# Patient Record
Sex: MALE | Race: WHITE | Employment: UNEMPLOYED | URBAN - METROPOLITAN AREA
[De-identification: names, ages, dates, MRNs, and addresses within clinical notes are randomized per-mention and may not be internally consistent; named-entity substitution may affect disease eponyms.]

---

## 2018-01-05 ENCOUNTER — APPOINTMENT (OUTPATIENT)
Dept: GENERAL RADIOLOGY | Facility: CLINIC | Age: 48
End: 2018-01-05
Attending: EMERGENCY MEDICINE
Payer: MEDICAID

## 2018-01-05 ENCOUNTER — HOSPITAL ENCOUNTER (OUTPATIENT)
Facility: CLINIC | Age: 48
Setting detail: OBSERVATION
Discharge: PSYCHIATRIC OR SUBSTANCE USE FACILITY | End: 2018-01-12
Attending: EMERGENCY MEDICINE | Admitting: INTERNAL MEDICINE
Payer: MEDICAID

## 2018-01-05 DIAGNOSIS — M62.81 GENERALIZED MUSCLE WEAKNESS: ICD-10-CM

## 2018-01-05 DIAGNOSIS — F11.288 OPIOID DEPENDENCE WITH OTHER OPIOID-INDUCED DISORDER (H): ICD-10-CM

## 2018-01-05 DIAGNOSIS — G25.81 RESTLESS LEG SYNDROME: ICD-10-CM

## 2018-01-05 DIAGNOSIS — F41.8 DEPRESSION WITH ANXIETY: Primary | ICD-10-CM

## 2018-01-05 DIAGNOSIS — E11.9 TYPE 2 DIABETES MELLITUS WITHOUT COMPLICATION, WITHOUT LONG-TERM CURRENT USE OF INSULIN (H): ICD-10-CM

## 2018-01-05 DIAGNOSIS — Z79.891 CHRONIC USE OF OPIATE DRUGS THERAPEUTIC PURPOSES: ICD-10-CM

## 2018-01-05 DIAGNOSIS — F32.A DEPRESSION, UNSPECIFIED DEPRESSION TYPE: ICD-10-CM

## 2018-01-05 DIAGNOSIS — Z78.9 TAKES DIETARY SUPPLEMENTS: ICD-10-CM

## 2018-01-05 PROBLEM — R53.1 WEAKNESS: Status: ACTIVE | Noted: 2018-01-05

## 2018-01-05 LAB
ALBUMIN SERPL-MCNC: 4.2 G/DL (ref 3.4–5)
ALBUMIN UR-MCNC: NEGATIVE MG/DL
ALP SERPL-CCNC: 140 U/L (ref 40–150)
ALT SERPL W P-5'-P-CCNC: 39 U/L (ref 0–70)
AMPHETAMINES UR QL SCN: NEGATIVE
ANION GAP SERPL CALCULATED.3IONS-SCNC: 9 MMOL/L (ref 3–14)
APAP SERPL-MCNC: <2 MG/L (ref 10–20)
APPEARANCE UR: CLEAR
AST SERPL W P-5'-P-CCNC: 22 U/L (ref 0–45)
BARBITURATES UR QL: NEGATIVE
BASOPHILS # BLD AUTO: 0 10E9/L (ref 0–0.2)
BASOPHILS NFR BLD AUTO: 0.4 %
BENZODIAZ UR QL: POSITIVE
BILIRUB SERPL-MCNC: 0.8 MG/DL (ref 0.2–1.3)
BILIRUB UR QL STRIP: NEGATIVE
BUN SERPL-MCNC: 12 MG/DL (ref 7–30)
CALCIUM SERPL-MCNC: 9.3 MG/DL (ref 8.5–10.1)
CANNABINOIDS UR QL SCN: NEGATIVE
CHLORIDE SERPL-SCNC: 99 MMOL/L (ref 94–109)
CO2 SERPL-SCNC: 27 MMOL/L (ref 20–32)
COCAINE UR QL: NEGATIVE
COLOR UR AUTO: YELLOW
CREAT SERPL-MCNC: 0.88 MG/DL (ref 0.66–1.25)
DIFFERENTIAL METHOD BLD: NORMAL
EOSINOPHIL # BLD AUTO: 0.1 10E9/L (ref 0–0.7)
EOSINOPHIL NFR BLD AUTO: 0.5 %
ERYTHROCYTE [DISTWIDTH] IN BLOOD BY AUTOMATED COUNT: 13 % (ref 10–15)
FLUAV+FLUBV AG SPEC QL: NEGATIVE
FLUAV+FLUBV AG SPEC QL: NEGATIVE
GFR SERPL CREATININE-BSD FRML MDRD: >90 ML/MIN/1.7M2
GLUCOSE SERPL-MCNC: 224 MG/DL (ref 70–99)
GLUCOSE UR STRIP-MCNC: 500 MG/DL
HCT VFR BLD AUTO: 43 % (ref 40–53)
HGB BLD-MCNC: 15.6 G/DL (ref 13.3–17.7)
HGB UR QL STRIP: NEGATIVE
IMM GRANULOCYTES # BLD: 0 10E9/L (ref 0–0.4)
IMM GRANULOCYTES NFR BLD: 0.3 %
INTERPRETATION ECG - MUSE: NORMAL
KETONES UR STRIP-MCNC: ABNORMAL MG/DL
LEUKOCYTE ESTERASE UR QL STRIP: NEGATIVE
LYMPHOCYTES # BLD AUTO: 1.4 10E9/L (ref 0.8–5.3)
LYMPHOCYTES NFR BLD AUTO: 13.9 %
MCH RBC QN AUTO: 32.6 PG (ref 26.5–33)
MCHC RBC AUTO-ENTMCNC: 36.3 G/DL (ref 31.5–36.5)
MCV RBC AUTO: 90 FL (ref 78–100)
MONOCYTES # BLD AUTO: 0.8 10E9/L (ref 0–1.3)
MONOCYTES NFR BLD AUTO: 7.5 %
NEUTROPHILS # BLD AUTO: 7.9 10E9/L (ref 1.6–8.3)
NEUTROPHILS NFR BLD AUTO: 77.4 %
NITRATE UR QL: NEGATIVE
NRBC # BLD AUTO: 0 10*3/UL
NRBC BLD AUTO-RTO: 0 /100
OPIATES UR QL SCN: POSITIVE
PCP UR QL SCN: NEGATIVE
PH UR STRIP: 6 PH (ref 5–7)
PLATELET # BLD AUTO: 287 10E9/L (ref 150–450)
POTASSIUM SERPL-SCNC: 3.4 MMOL/L (ref 3.4–5.3)
PROT SERPL-MCNC: 7.6 G/DL (ref 6.8–8.8)
RBC # BLD AUTO: 4.79 10E12/L (ref 4.4–5.9)
SALICYLATES SERPL-MCNC: <2 MG/DL
SODIUM SERPL-SCNC: 135 MMOL/L (ref 133–144)
SOURCE: ABNORMAL
SP GR UR STRIP: 1.01 (ref 1–1.03)
SPECIMEN SOURCE: NORMAL
TROPONIN I SERPL-MCNC: <0.015 UG/L (ref 0–0.04)
UROBILINOGEN UR STRIP-ACNC: 1 EU/DL (ref 0.2–1)
WBC # BLD AUTO: 10.3 10E9/L (ref 4–11)

## 2018-01-05 PROCEDURE — 25000128 H RX IP 250 OP 636: Performed by: INTERNAL MEDICINE

## 2018-01-05 PROCEDURE — G0378 HOSPITAL OBSERVATION PER HR: HCPCS

## 2018-01-05 PROCEDURE — 81003 URINALYSIS AUTO W/O SCOPE: CPT | Performed by: EMERGENCY MEDICINE

## 2018-01-05 PROCEDURE — 25000132 ZZH RX MED GY IP 250 OP 250 PS 637: Performed by: INTERNAL MEDICINE

## 2018-01-05 PROCEDURE — 84484 ASSAY OF TROPONIN QUANT: CPT | Performed by: EMERGENCY MEDICINE

## 2018-01-05 PROCEDURE — 96360 HYDRATION IV INFUSION INIT: CPT

## 2018-01-05 PROCEDURE — 25000128 H RX IP 250 OP 636: Performed by: EMERGENCY MEDICINE

## 2018-01-05 PROCEDURE — 80053 COMPREHEN METABOLIC PANEL: CPT | Performed by: EMERGENCY MEDICINE

## 2018-01-05 PROCEDURE — 85025 COMPLETE CBC W/AUTO DIFF WBC: CPT | Performed by: EMERGENCY MEDICINE

## 2018-01-05 PROCEDURE — 99220 ZZC INITIAL OBSERVATION CARE,LEVL III: CPT | Performed by: INTERNAL MEDICINE

## 2018-01-05 PROCEDURE — 99285 EMERGENCY DEPT VISIT HI MDM: CPT | Mod: 25

## 2018-01-05 PROCEDURE — 80329 ANALGESICS NON-OPIOID 1 OR 2: CPT | Performed by: EMERGENCY MEDICINE

## 2018-01-05 PROCEDURE — 87804 INFLUENZA ASSAY W/OPTIC: CPT | Performed by: EMERGENCY MEDICINE

## 2018-01-05 PROCEDURE — 96361 HYDRATE IV INFUSION ADD-ON: CPT

## 2018-01-05 PROCEDURE — 71046 X-RAY EXAM CHEST 2 VIEWS: CPT

## 2018-01-05 PROCEDURE — 90791 PSYCH DIAGNOSTIC EVALUATION: CPT

## 2018-01-05 PROCEDURE — 93005 ELECTROCARDIOGRAM TRACING: CPT

## 2018-01-05 RX ORDER — FENOFIBRATE 54 MG/1
54 TABLET ORAL DAILY
Status: DISCONTINUED | OUTPATIENT
Start: 2018-01-05 | End: 2018-01-12 | Stop reason: HOSPADM

## 2018-01-05 RX ORDER — CLOMIPRAMINE HYDROCHLORIDE 25 MG/1
25 CAPSULE ORAL DAILY
Status: DISCONTINUED | OUTPATIENT
Start: 2018-01-06 | End: 2018-01-06

## 2018-01-05 RX ORDER — ONDANSETRON 2 MG/ML
4 INJECTION INTRAMUSCULAR; INTRAVENOUS EVERY 6 HOURS PRN
Status: DISCONTINUED | OUTPATIENT
Start: 2018-01-05 | End: 2018-01-12 | Stop reason: HOSPADM

## 2018-01-05 RX ORDER — CLONIDINE HYDROCHLORIDE 0.1 MG/1
0.1 TABLET ORAL 2 TIMES DAILY
Status: DISCONTINUED | OUTPATIENT
Start: 2018-01-05 | End: 2018-01-12 | Stop reason: HOSPADM

## 2018-01-05 RX ORDER — TAMSULOSIN HYDROCHLORIDE 0.4 MG/1
0.4 CAPSULE ORAL DAILY
COMMUNITY

## 2018-01-05 RX ORDER — GABAPENTIN 600 MG/1
600 TABLET ORAL 3 TIMES DAILY
Status: DISCONTINUED | OUTPATIENT
Start: 2018-01-05 | End: 2018-01-12 | Stop reason: HOSPADM

## 2018-01-05 RX ORDER — HYDROCODONE BITARTRATE AND ACETAMINOPHEN 5; 325 MG/1; MG/1
1 TABLET ORAL EVERY 6 HOURS PRN
Status: DISCONTINUED | OUTPATIENT
Start: 2018-01-05 | End: 2018-01-06

## 2018-01-05 RX ORDER — ONDANSETRON 4 MG/1
4 TABLET, ORALLY DISINTEGRATING ORAL EVERY 6 HOURS PRN
Status: DISCONTINUED | OUTPATIENT
Start: 2018-01-05 | End: 2018-01-12 | Stop reason: HOSPADM

## 2018-01-05 RX ORDER — POLYETHYLENE GLYCOL 3350 17 G/17G
17 POWDER, FOR SOLUTION ORAL DAILY
Status: DISCONTINUED | OUTPATIENT
Start: 2018-01-06 | End: 2018-01-12 | Stop reason: HOSPADM

## 2018-01-05 RX ORDER — ACETAMINOPHEN 325 MG/1
650 TABLET ORAL EVERY 4 HOURS PRN
Status: DISCONTINUED | OUTPATIENT
Start: 2018-01-05 | End: 2018-01-12 | Stop reason: HOSPADM

## 2018-01-05 RX ORDER — LOSARTAN POTASSIUM 50 MG/1
100 TABLET ORAL DAILY
Status: DISCONTINUED | OUTPATIENT
Start: 2018-01-06 | End: 2018-01-06

## 2018-01-05 RX ORDER — CLONAZEPAM 0.5 MG/1
1 TABLET ORAL 3 TIMES DAILY
Status: DISCONTINUED | OUTPATIENT
Start: 2018-01-05 | End: 2018-01-05

## 2018-01-05 RX ORDER — ALLOPURINOL 300 MG/1
300 TABLET ORAL DAILY
Status: DISCONTINUED | OUTPATIENT
Start: 2018-01-06 | End: 2018-01-12 | Stop reason: HOSPADM

## 2018-01-05 RX ORDER — ATORVASTATIN CALCIUM 20 MG/1
20 TABLET, FILM COATED ORAL DAILY
Status: DISCONTINUED | OUTPATIENT
Start: 2018-01-06 | End: 2018-01-12 | Stop reason: HOSPADM

## 2018-01-05 RX ORDER — POLYETHYLENE GLYCOL 3350 17 G/17G
1 POWDER, FOR SOLUTION ORAL DAILY
COMMUNITY

## 2018-01-05 RX ORDER — SODIUM CHLORIDE 9 MG/ML
1000 INJECTION, SOLUTION INTRAVENOUS CONTINUOUS
Status: DISCONTINUED | OUTPATIENT
Start: 2018-01-05 | End: 2018-01-07

## 2018-01-05 RX ORDER — NALOXONE HYDROCHLORIDE 0.4 MG/ML
.1-.4 INJECTION, SOLUTION INTRAMUSCULAR; INTRAVENOUS; SUBCUTANEOUS
Status: DISCONTINUED | OUTPATIENT
Start: 2018-01-05 | End: 2018-01-12 | Stop reason: HOSPADM

## 2018-01-05 RX ORDER — METFORMIN HCL 500 MG
1000 TABLET, EXTENDED RELEASE 24 HR ORAL DAILY
Status: ON HOLD | COMMUNITY
End: 2018-01-12

## 2018-01-05 RX ORDER — CLONAZEPAM 0.5 MG/1
1 TABLET ORAL 3 TIMES DAILY
Status: DISCONTINUED | OUTPATIENT
Start: 2018-01-05 | End: 2018-01-06

## 2018-01-05 RX ORDER — ZOLPIDEM TARTRATE 5 MG/1
10 TABLET ORAL
Status: DISCONTINUED | OUTPATIENT
Start: 2018-01-05 | End: 2018-01-06

## 2018-01-05 RX ORDER — FEXOFENADINE HCL 60 MG/1
180 TABLET, FILM COATED ORAL DAILY PRN
Status: DISCONTINUED | OUTPATIENT
Start: 2018-01-05 | End: 2018-01-12 | Stop reason: HOSPADM

## 2018-01-05 RX ORDER — SODIUM CHLORIDE 9 MG/ML
INJECTION, SOLUTION INTRAVENOUS CONTINUOUS
Status: DISCONTINUED | OUTPATIENT
Start: 2018-01-05 | End: 2018-01-07

## 2018-01-05 RX ORDER — ACETAMINOPHEN 650 MG/1
650 SUPPOSITORY RECTAL EVERY 4 HOURS PRN
Status: DISCONTINUED | OUTPATIENT
Start: 2018-01-05 | End: 2018-01-12 | Stop reason: HOSPADM

## 2018-01-05 RX ORDER — TAMSULOSIN HYDROCHLORIDE 0.4 MG/1
0.4 CAPSULE ORAL DAILY
Status: DISCONTINUED | OUTPATIENT
Start: 2018-01-06 | End: 2018-01-12 | Stop reason: HOSPADM

## 2018-01-05 RX ORDER — PROPRANOLOL HYDROCHLORIDE 20 MG/1
20 TABLET ORAL 3 TIMES DAILY
Status: DISCONTINUED | OUTPATIENT
Start: 2018-01-05 | End: 2018-01-12 | Stop reason: HOSPADM

## 2018-01-05 RX ORDER — ALBUTEROL SULFATE 90 UG/1
1-2 AEROSOL, METERED RESPIRATORY (INHALATION) EVERY 4 HOURS PRN
COMMUNITY

## 2018-01-05 RX ORDER — ASPIRIN 81 MG/1
81 TABLET ORAL DAILY
Status: DISCONTINUED | OUTPATIENT
Start: 2018-01-06 | End: 2018-01-12 | Stop reason: HOSPADM

## 2018-01-05 RX ORDER — ALBUTEROL SULFATE 90 UG/1
1-2 AEROSOL, METERED RESPIRATORY (INHALATION) EVERY 4 HOURS PRN
Status: DISCONTINUED | OUTPATIENT
Start: 2018-01-05 | End: 2018-01-12 | Stop reason: HOSPADM

## 2018-01-05 RX ADMIN — CLONAZEPAM 1 MG: 0.5 TABLET ORAL at 20:04

## 2018-01-05 RX ADMIN — SODIUM CHLORIDE: 9 INJECTION, SOLUTION INTRAVENOUS at 21:42

## 2018-01-05 RX ADMIN — SODIUM CHLORIDE 1000 ML: 9 INJECTION, SOLUTION INTRAVENOUS at 14:11

## 2018-01-05 RX ADMIN — FENOFIBRATE 54 MG: 54 TABLET ORAL at 20:04

## 2018-01-05 RX ADMIN — PROPRANOLOL HYDROCHLORIDE 20 MG: 20 TABLET ORAL at 21:42

## 2018-01-05 RX ADMIN — ZOLPIDEM TARTRATE 10 MG: 5 TABLET, FILM COATED ORAL at 21:42

## 2018-01-05 RX ADMIN — GABAPENTIN 600 MG: 600 TABLET, FILM COATED ORAL at 21:42

## 2018-01-05 RX ADMIN — CLONIDINE HYDROCHLORIDE 0.1 MG: 0.1 TABLET ORAL at 20:04

## 2018-01-05 RX ADMIN — PROPRANOLOL HYDROCHLORIDE 20 MG: 20 TABLET ORAL at 18:45

## 2018-01-05 ASSESSMENT — ENCOUNTER SYMPTOMS
ABDOMINAL PAIN: 1
VOMITING: 0
NAUSEA: 1
DYSURIA: 1
HALLUCINATIONS: 0
APPETITE CHANGE: 1
CONSTIPATION: 1

## 2018-01-05 NOTE — IP AVS SNAPSHOT
` `     [place] [address] [facility]: [phone]            Medication Administration Report for Dhruv Shaikh as of 01/09/18 0927   Legend:    Given Hold Not Given Due Canceled Entry Other Actions    Time Time (Time) Time  Time-Action       Inactive    Active    Linked        Medications 01/03/18 01/04/18 01/05/18 01/06/18 01/07/18 01/08/18 01/09/18    acetaminophen (TYLENOL) Suppository 650 mg  Dose: 650 mg Freq: EVERY 4 HOURS PRN Route: RE  PRN Reason: mild pain  Start: 01/05/18 1750   Admin Instructions: Alternate ibuprofen (if ordered) with acetaminophen.  Maximum acetaminophen dose from all sources = 75 mg/kg/day not to exceed 4 grams/day.               acetaminophen (TYLENOL) tablet 650 mg  Dose: 650 mg Freq: EVERY 4 HOURS PRN Route: PO  PRN Reason: mild pain  Start: 01/05/18 1750   Admin Instructions: Alternate ibuprofen (if ordered) with acetaminophen.  Maximum acetaminophen dose from all sources = 75 mg/kg/day not to exceed 4 grams/day.          0115 (650 mg)-Given        0151 (650 mg)-Given           albuterol (PROAIR HFA/PROVENTIL HFA/VENTOLIN HFA) Inhaler 1-2 puff  Dose: 1-2 puff Freq: EVERY 4 HOURS PRN Route: IN  PRN Reasons: shortness of breath / dyspnea,wheezing  Start: 01/05/18 1749              allopurinol (ZYLOPRIM) tablet 300 mg  Dose: 300 mg Freq: DAILY Route: PO  Start: 01/06/18 0900       1012 (300 mg)-Given        1001 (300 mg)-Given        0849 (300 mg)-Given        0906 (300 mg)-Given           aspirin EC EC tablet 81 mg  Dose: 81 mg Freq: DAILY Route: PO  Start: 01/06/18 0900       1012 (81 mg)-Given        1001 (81 mg)-Given        0849 (81 mg)-Given        0907 (81 mg)-Given           atorvastatin (LIPITOR) tablet 20 mg  Dose: 20 mg Freq: DAILY Route: PO  Start: 01/06/18 0900       1012 (20 mg)-Given        1001 (20 mg)-Given        0848 (20 mg)-Given        0907 (20 mg)-Given           cloNIDine (CATAPRES) tablet 0.1 mg  Dose: 0.1 mg Freq: 2 TIMES DAILY Route:  PO  Start: 01/05/18 2100   Admin Instructions: Hold for SBP less than 100 or HR < 60       2004 (0.1 mg)-Given        1013 (0.1 mg)-Given       2131 (0.1 mg)-Given        1001 (0.1 mg)-Given       2142 (0.1 mg)-Given        0848 (0.1 mg)-Given       2104 (0.1 mg)-Given        0907 (0.1 mg)-Given       [ ] 2100           fenofibrate tablet 54 mg  Dose: 54 mg Freq: DAILY Route: PO  Start: 01/05/18 2000   Admin Instructions: Therapeutic interchange for Fenofibrate (Tricor) 48 mg daily.<br>       2004 (54 mg)-Given        1012 (54 mg)-Given        1001 (54 mg)-Given        0849 (54 mg)-Given        0907 (54 mg)-Given           fexofenadine (ALLEGRA) tablet 180 mg  Dose: 180 mg Freq: DAILY PRN Route: PO  PRN Reason: allergies  Start: 01/05/18 1750              gabapentin (NEURONTIN) tablet 600 mg  Dose: 600 mg Freq: 3 TIMES DAILY Route: PO  Start: 01/05/18 2200      2142 (600 mg)-Given        1012 (600 mg)-Given       1707 (600 mg)-Given       2131 (600 mg)-Given        1001 (600 mg)-Given       1628 (600 mg)-Given       2141 (600 mg)-Given        0849 (600 mg)-Given       1619 (600 mg)-Given       2104 (600 mg)-Given        0906 (600 mg)-Given       [ ] 1600       [ ] 2200           glucose 40 % gel 15-30 g  Dose: 15-30 g Freq: EVERY 15 MIN PRN Route: PO  PRN Reason: low blood sugar  Start: 01/06/18 1252   Admin Instructions: Give 15 g for BG 51 to 69 mg/dL IF patient is conscious and able to swallow. Give 30 g for BG less than or equal to 50 mg/dL IF patient is conscious and able to swallow. Do NOT give glucose gel via enteral tube.  IF patient has enteral tube: give apple juice 120 mL (4 oz or 15 g of CHO) via enteral tube for BG 51 to 69 mg/dL.  Give apple juice 240 mL (8 oz or 30 g of CHO) via enteral tube for BG less than or equal to 50 mg/dL.    ~Oral gel is preferable for conscious and able to swallow patient.   ~IF gel unavailable or patient refuses may provide apple juice 120 mL (4 oz or 15 g of CHO). Document  juice on I and O flowsheet.              Or  dextrose 50 % injection 25-50 mL  Dose: 25-50 mL Freq: EVERY 15 MIN PRN Route: IV  PRN Reason: low blood sugar  Start: 01/06/18 1252   Admin Instructions: Use if have IV access, BG less than 70 mg/dL and meet dose criteria below:  Dose if conscious and alert (or disorientated) and NPO = 25 mL  Dose if unconscious / not alert = 50 mL  Vesicant. For ordered doses up to 25 mg, give IV Push undiluted. Give each 5g over 1 minute.              Or  glucagon injection 1 mg  Dose: 1 mg Freq: EVERY 15 MIN PRN Route: SC  PRN Reason: low blood sugar  PRN Comment: May repeat x 1 only  Start: 01/06/18 1252   Admin Instructions: May give SQ or IM. ONLY use glucagon IF patient has NO IV access AND is UNABLE to swallow AND blood glucose is LESS than or EQUAL to 50 mg/dL.  Give IV Push over 1 minute. Reconstitute with 1mL sterile water.               metFORMIN (GLUCOPHAGE-XR) 24 hr tablet 1,000 mg  Dose: 1,000 mg Freq: DAILY Route: PO  Start: 01/06/18 1300   Admin Instructions: Do not crush.  If the patient receives intravenous, iodinated contrast and patient GFR is greater than 60 ml/min, continue metformin.  Contact provider for 'hold' or 'no hold' instructions if no GFR or if GFR is less than 60 ml/min.        1404 (1,000 mg)-Given        1001 (1,000 mg)-Given        0848 (1,000 mg)-Given        0907 (1,000 mg)-Given           naloxone (NARCAN) injection 0.1-0.4 mg  Dose: 0.1-0.4 mg Freq: EVERY 2 MIN PRN Route: IV  PRN Reason: opioid reversal  Start: 01/05/18 1750   Admin Instructions: For respiratory rate LESS than or EQUAL to 8.  Partial reversal dose:  0.1 mg titrated q 2 minutes for Analgesia Side Effects Monitoring Sedation Level of 3 (frequently drowsy, arousable, drifts to sleep during conversation).Full reversal dose:  0.4 mg bolus for Analgesia Side Effects Monitoring Sedation Level of 4 (somnolent, minimal or no response to stimulation).  For ordered doses up to 2mg give IVP.  Give each 0.4mg over 15 seconds in emergency situations. For non-emergent situations further dilute in 9mL of NS to facilitate titration of response.               ondansetron (ZOFRAN-ODT) ODT tab 4 mg  Dose: 4 mg Freq: EVERY 6 HOURS PRN Route: PO  PRN Reasons: nausea,vomiting  Start: 01/05/18 1750   Admin Instructions: This is Step 1 of nausea and vomiting management.  If nausea not resolved in 15 minutes, go to Step 2 prochlorperazine (COMPAZINE). Do not push through foil backing. Peel back foil and gently remove. Place on tongue immediately. Administration with liquid unnecessary              Or  ondansetron (ZOFRAN) injection 4 mg  Dose: 4 mg Freq: EVERY 6 HOURS PRN Route: IV  PRN Reasons: nausea,vomiting  Start: 01/05/18 1750   Admin Instructions: This is Step 1 of nausea and vomiting management.  If nausea not resolved in 15 minutes, go to Step 2 prochlorperazine (COMPAZINE).  Irritant. For ordered doses up to 4 mg, give IV Push undiluted over 2-5 minutes.               PHENobarbital (LUMINAL) tablet 64.8 mg  Dose: 64.8 mg Freq: 2 TIMES DAILY Route: PO  Start: 01/09/18 0900   End: 01/10/18 0859          0906 (64.8 mg)-Given       [ ] 2100           polyethylene glycol (MIRALAX/GLYCOLAX) Packet 17 g  Dose: 17 g Freq: DAILY Route: PO  Start: 01/06/18 0900   Admin Instructions: 1 Packet = 17 grams. Mixed prescribed dose in 8 ounces of water. Follow with 8 oz. of water.        1013 (17 g)-Given        1002 (17 g)-Given        (0853)-Not Given        (0908)-Not Given             Dose: 0.125 mg Freq: 3 TIMES DAILY Route: PO  Start: 01/08/18 0900   End: 01/09/18 0846         (0939)-Not Given [C]       1618 (0.125 mg)-Given       2103 (0.125 mg)-Given        0846-Med Discontinued       pramipexole (MIRAPEX) tablet 1.5 mg  Dose: 1.5 mg Freq: 3 TIMES DAILY Route: PO  Start: 01/09/18 0900          [ ] 0900       [ ] 1600       [ ] 2200           propranolol (INDERAL) tablet 20 mg  Dose: 20 mg Freq: 3 TIMES DAILY Route:  PO  Start: 01/05/18 1800   Admin Instructions: Hold for SBP less than 100 or HR < 60       1845 (20 mg)-Given       2142 (20 mg)-Given        1012 (20 mg)-Given       (1655)-Not Given [C]       2130 (20 mg)-Given        1001 (20 mg)-Given       1628 (20 mg)-Given       2141 (20 mg)-Given        0848 (20 mg)-Given       1619 (20 mg)-Given       2105 (20 mg)-Given        0906 (20 mg)-Given       [ ] 1600       [ ] 2200             Dose: 100 mg Freq: AT BEDTIME Route: PO  Start: 01/07/18 2200   End: 01/09/18 0859        2142 (100 mg)-Given        2104 (100 mg)-Given        0859-Med Discontinued       QUEtiapine (SEROquel) tablet 25-50 mg  Dose: 25-50 mg Freq: EVERY 4 HOURS PRN Route: PO  PRN Comment: agitation/anxiety  Start: 01/06/18 1050       1800 (25 mg)-Given       2315 (25 mg)-Given        1505 (25 mg)-Given        0116 (25 mg)-Given       0957 (50 mg)-Given       1428 (25 mg)-Given       1938 (50 mg)-Given [C]        0014 (50 mg)-Given       0906 (25 mg)-Given           tamsulosin (FLOMAX) capsule 0.4 mg  Dose: 0.4 mg Freq: DAILY Route: PO  Start: 01/06/18 0900   Admin Instructions: Administer 30 minutes after the same meal each day.  Capsules should be swallowed whole; do not crush chew or open.        1012 (0.4 mg)-Given        1001 (0.4 mg)-Given        0849 (0.4 mg)-Given        0906 (0.4 mg)-Given           vortioxetine (TRINTELLIX/BRINTELLIX) tablet 5 mg  Dose: 5 mg Freq: DAILY Route: PO  Start: 01/09/18 0900          [ ] 0900          Future Medications  Medications 01/03/18 01/04/18 01/05/18 01/06/18 01/07/18 01/08/18 01/09/18       mirtazapine (REMERON) tablet 30 mg  Dose: 30 mg Freq: AT BEDTIME Route: PO  Start: 01/09/18 2200          [ ] 2200           PHENobarbital (LUMINAL) tablet 32.4 mg  Dose: 32.4 mg Freq: 3 TIMES DAILY Route: PO  Start: 01/10/18 0900              PHENobarbital (LUMINAL) tablet 32.4 mg  Dose: 32.4 mg Freq: DAILY WITH SUPPER Route: PO  Start: 01/09/18 1700   End: 01/10/18 0795           [ ] 1700           QUEtiapine (SEROquel) tablet 150 mg  Dose: 150 mg Freq: AT BEDTIME Route: PO  Start: 01/09/18 2200          [ ] 2200          Completed Medications  Medications 01/03/18 01/04/18 01/05/18 01/06/18 01/07/18 01/08/18 01/09/18         Dose: 32.4 mg Freq: 2 TIMES DAILY Route: PO  Start: 01/06/18 1100   End: 01/06/18 2130       1405 (32.4 mg)-Given       2130 (32.4 mg)-Given                Dose: 64.8 mg Freq: 3 TIMES DAILY Route: PO  Start: 01/08/18 0900   End: 01/08/18 2103         0849 (64.8 mg)-Given       1619 (64.8 mg)-Given       2103 (64.8 mg)-Given           Discontinued Medications  Medications 01/03/18 01/04/18 01/05/18 01/06/18 01/07/18 01/08/18 01/09/18         Rate: 100 mL/hr Freq: CONTINUOUS Route: IV  Start: 01/05/18 1800   End: 01/07/18 1129      2142 ( )-New Bag        0706 ( )-New Bag       1755 ( )-New Bag        0150 ( )-Rate/Dose Verify       0414 ( )-New Bag       1129-Med Discontinued           Rate: 125 mL/hr Freq: CONTINUOUS Route: IV  Start: 01/05/18 1329   End: 01/07/18 1129   Admin Instructions: Administer after the bolus.                1129-Med Discontinued           Dose: 25 mg Freq: DAILY Route: PO  Start: 01/06/18 0900   End: 01/06/18 1055       1012 (25 mg)-Given       1055-Med Discontinued            Dose: 1 mg Freq: 3 TIMES DAILY Route: PO  Start: 01/05/18 2000   End: 01/06/18 1055      2004 (1 mg)-Given        1012 (1 mg)-Given       1055-Med Discontinued            Dose: 5 mg Freq: EVERY MORNING BEFORE BREAKFAST Route: PO  Start: 01/07/18 0730   End: 01/07/18 1457   Admin Instructions: Take before or with meals.         1002 (5 mg)-Given       1457-Med Discontinued           Dose: 1 tablet Freq: EVERY 6 HOURS PRN Route: PO  PRN Reason: moderate to severe pain  Start: 01/05/18 1750   End: 01/06/18 1053   Admin Instructions: Maximum acetaminophen dose from all sources= 75 mg/kg/day not to exceed 4 grams        1053-Med Discontinued            Dose: 1-3 Units  Freq: AT BEDTIME Route: SC  Start: 01/06/18 2200   End: 01/07/18 1458   Admin Instructions: LOW INSULIN RESISTANCE DOSING    Do Not give Bedtime Correction Insulin if BG less than 200.   For  - 299 give 1 unit.   For  - 399 give 2 units   For BG greater than or equal 400 give 3 units.   Notify provider if glucose greater than or equal to 350 mg/dL after administration of correction dose.  If given at mealtime, must be administered 5 min before meal or immediately after.        (2124)-Not Given        1458-Med Discontinued           Dose: 1-3 Units Freq: 3 TIMES DAILY BEFORE MEALS Route: SC  Start: 01/06/18 1300   End: 01/07/18 1458   Admin Instructions: Correction Scale - LOW INSULIN RESISTANCE DOSING     Do Not give Correction Insulin if Pre-Meal BG less than 140.   For Pre-Meal  - 239 give 1 unit.   For Pre-Meal  - 339 give 2 units.   For Pre-Meal BG greater than or equal to 340 give 3 units.   To be given with prandial insulin, and based on pre-meal blood glucose.   Notify provider if glucose greater than or equal to 350 mg/dL after administration of correction dose.  If given at mealtime, must be administered 5 min before meal or immediately after.        1418 (1 Units)-Given       1712 (1 Units)-Given        1002 (1 Units)-Given       (1420)-Not Given       1458-Med Discontinued           Dose: 100 mg Freq: DAILY Route: PO  Start: 01/06/18 0900   End: 01/06/18 1712       1012 (100 mg)-Given       1712-Med Discontinued            Dose: 64.8 mg Freq: 3 TIMES DAILY Route: PO  Start: 01/07/18 0900   End: 01/08/18 0746        1001 (64.8 mg)-Given       1628 (64.8 mg)-Given       2141 (64.8 mg)-Given        0746-Med Discontinued          Dose: 10 mg Freq: AT BEDTIME PRN Route: PO  PRN Reason: sleep  Start: 01/05/18 1750   End: 01/06/18 1023      2142 (10 mg)-Given        1023-Med Discontinued       Medications 01/03/18 01/04/18 01/05/18 01/06/18 01/07/18 01/08/18 01/09/18

## 2018-01-05 NOTE — IP AVS SNAPSHOT
MRN:[id number]                      After Visit Summary   1/5/2018    Dhruv Shaikh    MRN: [id number]           Thank you!     Thank you for choosing [facility] for your care. Our goal is always to provide you with excellent care. Hearing back from our patients is one way we can continue to improve our services. Please take a few minutes to complete the written survey that you may receive in the mail after you visit with us. Thank you!        Patient Information     Date Of Birth          1970        Designated Caregiver       Most Recent Value    Caregiver    Will someone help with your care after discharge? yes    Name of designated caregiver Nydia Shaikh    Phone number of caregiver [phone]    Caregiver address [address] [place], [place]      About your hospital stay     You were admitted on:  January 5, 2018 You last received care in the:  [facility] [address] [facility]    You were discharged on:  January 12, 2018        Reason for your hospital stay       Opioid and benzo dependence.                  Who to Call     For medical emergencies, please call 911.  For non-urgent questions about your medical care, please call your primary care provider or clinic, None          Attending Provider     Provider Specialty    Drew Barnes MD Emergency Medicine    Ralph, Fercho Jones MD Internal Medicine       Primary Care Provider Fax #    Mark Hinkleet [phone]      After Care Instructions     Activity - Up ad radha           Advance Diet as Tolerated       Follow this diet upon discharge: Orders Placed This Encounter      Regular Diet Adult                  Pending Results     No orders found from 1/3/2018 to 1/6/2018.            Statement of Approval     Ordered          01/12/18 1437  I have reviewed and agree with all the recommendations and orders detailed in this document.  EFFECTIVE NOW     Comments:  Transfer to Psychiatry floor.   Approved and  "electronically signed by:  Caryn Alves MD             Admission Information     Date & Time Provider Department Dept. Phone    2018 Fercho Rosas MD [facility] [address] [facility] [phone]      Your Vitals Were     Blood Pressure Pulse Temperature Respirations Height Weight    108/67 (BP Location: Right arm) 86 97.2  F (36.2  C) (Oral) 18 1.727 m (5' 8\") 107 kg (235 lb 14.3 oz)    Pulse Oximetry BMI (Body Mass Index)                96% 35.87 kg/m2          [employer] Information     [employer] lets you send messages to your doctor, view your test results, renew your prescriptions, schedule appointments and more. To sign up, go to [email or web address] . Click on \"Log in\" on the left side of the screen, which will take you to the Welcome page. Then click on \"Sign up Now\" on the right side of the page.     You will be asked to enter the access code listed below, as well as some personal information. Please follow the directions to create your username and password.     Your access code is: [email or web address]  Expires: 2018  9:26 AM     Your access code will  in 90 days. If you need help or a new code, please call your [facility] clinic or [phone].        Care EveryWhere ID     This is your Care EveryWhere ID. This could be used by other organizations to access your [facility] medical records  [id number]        Equal Access to Services     [facility] Hospital[facility] AH: Hadii lisa sepulveda hadasho Soomaali, waaxda luqadaha, qaybta kaalmada mustaphaegyada, ashlie bal . So [facility] [phone].    ATENCIÓN: Si habla español, tiene a bautista disposición servicios gratuitos de asistencia lingüística. Llame al [phone].    We comply with applicable federal civil rights laws and [place] laws. We do not discriminate on the basis of race, color, national origin, age, disability, sex, sexual orientation, or gender identity.               Review of your medicines      START taking        Dose " / Directions    magnesium 250 MG tablet   Used for:  Restless leg syndrome        Dose:  1 tablet   Take 1 tablet by mouth daily   Quantity:  30 tablet   Refills:  0       mirtazapine 45 MG tablet   Commonly known as:  REMERON   Used for:  Depression with anxiety        Dose:  45 mg   Take 1 tablet (45 mg) by mouth At Bedtime   Quantity:  30 tablet   Refills:  0       multivitamin, therapeutic with minerals Tabs tablet   Used for:  Takes dietary supplements        Dose:  1 tablet   Take 1 tablet by mouth daily   Quantity:  100 tablet   Refills:  3       PHENobarbital 32.4 MG Tabs tablet   Commonly known as:  LUMINAL   Used for:  Opioid dependence with other opioid-induced disorder (H)        Dose:  32.4 mg   Take 1 tablet (32.4 mg) by mouth At Bedtime   Quantity:  60 tablet   Refills:  0       pramipexole 1.5 MG tablet   Commonly known as:  MIRAPEX   Used for:  Restless leg syndrome        Dose:  1.5 mg   Take 1 tablet (1.5 mg) by mouth 3 times daily   Refills:  0       * QUEtiapine 300 MG tablet   Commonly known as:  SEROquel   Used for:  Depression with anxiety        Dose:  300 mg   Take 1 tablet (300 mg) by mouth At Bedtime   Quantity:  60 tablet   Refills:  0       * QUEtiapine 25 MG tablet   Commonly known as:  SEROquel   Used for:  Depression with anxiety        Dose:  25-50 mg   Take 1-2 tablets (25-50 mg) by mouth every 4 hours as needed (agitation/anxiety)   Quantity:  60 tablet   Refills:  0       vortioxetine 10 MG tablet   Commonly known as:  TRINTELLIX/BRINTELLIX   Used for:  Depression with anxiety        Dose:  10 mg   Start taking on:  1/13/2018   Take 1 tablet (10 mg) by mouth daily   Refills:  0       * Notice:  This list has 2 medication(s) that are the same as other medications prescribed for you. Read the directions carefully, and ask your doctor or other care provider to review them with you.      CONTINUE these medicines which may have CHANGED, or have new prescriptions. If we are uncertain  of the size of tablets/capsules you have at home, strength may be listed as something that might have changed.        Dose / Directions    * metFORMIN 500 MG 24 hr tablet   Commonly known as:  GLUCOPHAGE-XR   This may have changed:  You were already taking a medication with the same name, and this prescription was added. Make sure you understand how and when to take each.   Used for:  Type 2 diabetes mellitus without complication, without long-term current use of insulin (H)        Dose:  500 mg   Take 1 tablet (500 mg) by mouth daily (with dinner)   Quantity:  30 tablet   Refills:  0       * metFORMIN 500 MG 24 hr tablet   Commonly known as:  GLUCOPHAGE-XR   This may have changed:  when to take this   Used for:  Type 2 diabetes mellitus without complication, without long-term current use of insulin (H)        Dose:  1000 mg   Take 2 tablets (1,000 mg) by mouth daily (with breakfast)   Quantity:  30 tablet   Refills:  0       * Notice:  This list has 2 medication(s) that are the same as other medications prescribed for you. Read the directions carefully, and ask your doctor or other care provider to review them with you.      CONTINUE these medicines which have NOT CHANGED        Dose / Directions    albuterol 108 (90 BASE) MCG/ACT Inhaler   Commonly known as:  PROAIR HFA/PROVENTIL HFA/VENTOLIN HFA        Dose:  1-2 puff   Inhale 1-2 puffs into the lungs every 4 hours as needed for shortness of breath / dyspnea or wheezing   Refills:  0       ALLEGRA PO        Dose:  180 mg   Take 180 mg by mouth daily as needed for allergies   Refills:  0       allopurinol 300 MG tablet   Commonly known as:  ZYLOPRIM        Dose:  300 mg   Take 300 mg by mouth daily.   Refills:  0       ASPIRIN EC PO        Dose:  81 mg   Take 81 mg by mouth daily   Refills:  0       ATORVASTATIN CALCIUM PO        Dose:  20 mg   Take 20 mg by mouth daily   Refills:  0       CLONIDINE HCL PO        Dose:  0.1 mg   Take 0.1 mg by mouth 2 times daily    Refills:  0       FLOMAX 0.4 MG capsule   Generic drug:  tamsulosin        Dose:  0.4 mg   Take 0.4 mg by mouth daily   Refills:  0       GABAPENTIN PO        Dose:  600 mg   Take 600 mg by mouth 3 times daily   Refills:  0       polyethylene glycol Packet   Commonly known as:  MIRALAX/GLYCOLAX        Dose:  1 packet   Take 1 packet by mouth daily   Refills:  0       PROPRANOLOL HCL PO        Dose:  20 mg   Take 20 mg by mouth 3 times daily   Refills:  0       TRICOR PO        Dose:  48 mg   Take 48 mg by mouth daily   Refills:  0         STOP taking     AMBIEN PO           CLOMIPRAMINE HCL PO           CLONAZEPAM PO           CYCLOBENZAPRINE HCL PO           HYDROcodone-acetaminophen  MG per tablet   Commonly known as:  LORTAB           LOSARTAN POTASSIUM PO                Where to get your medicines      Some of these will need a paper prescription and others can be bought over the counter. Ask your nurse if you have questions.     You don't need a prescription for these medications     magnesium 250 MG tablet    metFORMIN 500 MG 24 hr tablet    metFORMIN 500 MG 24 hr tablet    mirtazapine 45 MG tablet    multivitamin, therapeutic with minerals Tabs tablet    pramipexole 1.5 MG tablet    QUEtiapine 25 MG tablet    QUEtiapine 300 MG tablet    vortioxetine 10 MG tablet         Information about where to get these medications is not yet available     ! Ask your nurse or doctor about these medications     PHENobarbital 32.4 MG Tabs tablet                Protect others around you: Learn how to safely use, store and throw away your medicines at www.disposemymeds.org.             Medication List: This is a list of all your medications and when to take them. Check marks below indicate your daily home schedule. Keep this list as a reference.      Medications           Morning Afternoon Evening Bedtime As Needed    albuterol 108 (90 BASE) MCG/ACT Inhaler   Commonly known as:  PROAIR HFA/PROVENTIL HFA/VENTOLIN HFA    Inhale 1-2 puffs into the lungs every 4 hours as needed for shortness of breath / dyspnea or wheezing                                ALLEGRA PO   Take 180 mg by mouth daily as needed for allergies                                allopurinol 300 MG tablet   Commonly known as:  ZYLOPRIM   Take 300 mg by mouth daily.   Last time this was given:  300 mg on 1/12/2018  9:15 AM                                ASPIRIN EC PO   Take 81 mg by mouth daily   Last time this was given:  81 mg on 1/12/2018  9:16 AM                                ATORVASTATIN CALCIUM PO   Take 20 mg by mouth daily   Last time this was given:  20 mg on 1/12/2018  9:16 AM                                CLONIDINE HCL PO   Take 0.1 mg by mouth 2 times daily   Last time this was given:  0.1 mg on 1/12/2018  9:16 AM                                FLOMAX 0.4 MG capsule   Take 0.4 mg by mouth daily   Last time this was given:  0.4 mg on 1/12/2018  9:16 AM   Generic drug:  tamsulosin                                GABAPENTIN PO   Take 600 mg by mouth 3 times daily   Last time this was given:  600 mg on 1/12/2018  9:15 AM                                magnesium 250 MG tablet   Take 1 tablet by mouth daily                                * metFORMIN 500 MG 24 hr tablet   Commonly known as:  GLUCOPHAGE-XR   Take 1 tablet (500 mg) by mouth daily (with dinner)   Last time this was given:  1,000 mg on 1/12/2018  9:15 AM                                * metFORMIN 500 MG 24 hr tablet   Commonly known as:  GLUCOPHAGE-XR   Take 2 tablets (1,000 mg) by mouth daily (with breakfast)   Last time this was given:  1,000 mg on 1/12/2018  9:15 AM                                mirtazapine 45 MG tablet   Commonly known as:  REMERON   Take 1 tablet (45 mg) by mouth At Bedtime   Last time this was given:  30 mg on 1/11/2018  9:40 PM                                multivitamin, therapeutic with minerals Tabs tablet   Take 1 tablet by mouth daily                                 PHENobarbital 32.4 MG Tabs tablet   Commonly known as:  LUMINAL   Take 1 tablet (32.4 mg) by mouth At Bedtime   Last time this was given:  32.4 mg on 1/11/2018  9:41 PM                                polyethylene glycol Packet   Commonly known as:  MIRALAX/GLYCOLAX   Take 1 packet by mouth daily   Last time this was given:  17 g on 1/11/2018  8:03 AM                                pramipexole 1.5 MG tablet   Commonly known as:  MIRAPEX   Take 1 tablet (1.5 mg) by mouth 3 times daily   Last time this was given:  1.5 mg on 1/12/2018  9:16 AM                                PROPRANOLOL HCL PO   Take 20 mg by mouth 3 times daily   Last time this was given:  20 mg on 1/12/2018  9:16 AM                                * QUEtiapine 300 MG tablet   Commonly known as:  SEROquel   Take 1 tablet (300 mg) by mouth At Bedtime   Last time this was given:  50 mg on 1/12/2018  3:06 PM                                * QUEtiapine 25 MG tablet   Commonly known as:  SEROquel   Take 1-2 tablets (25-50 mg) by mouth every 4 hours as needed (agitation/anxiety)   Last time this was given:  50 mg on 1/12/2018  3:06 PM                                TRICOR PO   Take 48 mg by mouth daily                                vortioxetine 10 MG tablet   Commonly known as:  TRINTELLIX/BRINTELLIX   Take 1 tablet (10 mg) by mouth daily   Start taking on:  1/13/2018   Last time this was given:  10 mg on 1/12/2018  9:15 AM                                * Notice:  This list has 4 medication(s) that are the same as other medications prescribed for you. Read the directions carefully, and ask your doctor or other care provider to review them with you.

## 2018-01-05 NOTE — IP AVS SNAPSHOT
[facility] [address] [facility]    [address] [place] [address]    Phone:  [phone]                                       After Visit Summary   1/5/2018    Dhruv Shaikh    MRN: [id number]           After Visit Summary Signature Page     I have received my discharge instructions, and my questions have been answered. I have discussed any challenges I see with this plan with the nurse or doctor.    ..........................................................................................................................................  Patient/Patient Representative Signature      ..........................................................................................................................................  Patient Representative Print Name and Relationship to Patient    ..................................................               ................................................  Date                                            Time    ..........................................................................................................................................  Reviewed by Signature/Title    ...................................................              ..............................................  Date                                                            Time

## 2018-01-05 NOTE — ED NOTES
[facility]  ED Nurse Handoff Report    ED Chief complaint: Psychiatric Evaluation (Mother called 911 for check the welfare of her son who lives independently in an apartment)      ED Diagnosis:   Final diagnoses:   Generalized muscle weakness   Chronic use of opiate drugs therapeutic purposes   Depression, unspecified depression type       Code Status: see epic    Allergies:   Allergies   Allergen Reactions     Ace Inhibitors      Dust Mites      Mold      Pollen Extract      Weed [Grass]        Activity level - Baseline/Home:  Independent    Activity Level - Current:   Independent     Needed?: No    Isolation: No  Infection: Not Applicable    Bariatric?: No    Vital Signs:   Vitals:    01/05/18 1400 01/05/18 1430 01/05/18 1445 01/05/18 1633   BP:  (!) 141/99  (!) 141/106   Resp: 20 18 18 18   Temp:       TempSrc:       SpO2: 99% 95% 98% 97%   Weight:       Height:           Cardiac Rhythm: ,   Cardiac  Cardiac Rhythm: Normal sinus rhythm    Pain level:      Is this patient confused?: Yes    Patient Report: Initial Complaint: pt called his mother today and reported not being able to sleep X5 nights. Mom called PD for a wellness check. PD found pt's apt to be filthy and filled with women's lingerie. Pt was completely disheveled and also wearing women's lingerie and found with a 9mm handgun and a box of ammo (these were taken into custody per PD). Per pt's mother pt has cycles of not being compliment with his meds, and gets like this. Today it was found that Pt had taken all of his medications out of all the bottles and poured them into a brown paper bag and was taking a few pills at a time when he felt like it.  Hx of chronic pain taking narcs and benzos.  Focused Assessment: disheveled/untidy. Mucous membranes dry. AOX3, hazy on situation. VSS. Tearful at times, states he has been depressed because he couldn't go to [place] to visit his dad, due to recent GI bug. Plan to admit to medical due  to pt's hx with chronic pain and medication misuse and consult psych.   Tests Performed: labs, ua, see epic   Abnormal Results: glucose ^, Ua= + for benzos and opiates   Treatments provided: 1L NS    Family Comments: none    OBS brochure/video discussed/provided to patient: No    ED Medications:   Medications   0.9% sodium chloride BOLUS (0 mLs Intravenous Stopped 1/5/18 1635)     Followed by   0.9% sodium chloride infusion (not administered)       Drips infusing?:  No      ED NURSE PHONE NUMBER: [phone]

## 2018-01-05 NOTE — PHARMACY-ADMISSION MEDICATION HISTORY
Admission medication history interview status for the 1/5/2018  admission is complete. See EPIC admission navigator for prior to admission medications     Medication history source reliability:Poor    Actions taken by pharmacist (provider contacted, etc):None     Additional medication history information not noted on PTA med list :None    Medication reconciliation/reorder completed by provider prior to medication history? No    Time spent in this activity: med list from care everywhere, [facility] as this is primary care provider and patient interview.  Patient groggy but remembered what he took today and the doses.     Prior to Admission medications    Medication Sig Last Dose Taking? Auth Provider   ATORVASTATIN CALCIUM PO Take 20 mg by mouth daily 1/5/2018 at Unknown time Yes Unknown, Entered By History   CLOMIPRAMINE HCL PO Take 25 mg by mouth daily 1/5/2018 at Unknown time Yes Unknown, Entered By History   Zolpidem Tartrate (AMBIEN PO) Take 10 mg by mouth nightly as needed for sleep 1/5/2018 at am Yes Unknown, Entered By History   HYDROcodone-acetaminophen (LORTAB)  MG per tablet Take 1 tablet by mouth every 6 hours 1/5/2018 Yes Unknown, Entered By History   CLONAZEPAM PO Take 1 mg by mouth 3 times daily 1/5/2018 at Unknown time Yes Unknown, Entered By History   polyethylene glycol (MIRALAX/GLYCOLAX) Packet Take 1 packet by mouth daily Past Week at Unknown time Yes Unknown, Entered By History   PROPRANOLOL HCL PO Take 20 mg by mouth 3 times daily 1/5/2018 at Unknown time Yes Unknown, Entered By History   CLONIDINE HCL PO Take 0.1 mg by mouth 2 times daily 1/5/2018 at Unknown time Yes Unknown, Entered By History   tamsulosin (FLOMAX) 0.4 MG capsule Take 0.4 mg by mouth daily 1/5/2018 at Unknown time Yes Unknown, Entered By History   ASPIRIN EC PO Take 81 mg by mouth daily 1/5/2018 at Unknown time Yes Unknown, Entered By History   metFORMIN (GLUCOPHAGE-XR) 500 MG 24 hr tablet Take 1,000 mg by mouth  daily 1/5/2018 at Unknown time Yes Unknown, Entered By History   GABAPENTIN PO Take 600 mg by mouth 3 times daily 1/5/2018 at Unknown time Yes Unknown, Entered By History   LOSARTAN POTASSIUM PO Take 100 mg by mouth daily 1/5/2018 at Unknown time Yes Unknown, Entered By History   allopurinol (ZYLOPRIM) 300 MG tablet Take 300 mg by mouth daily. 1/5/2018 at Unknown time Yes Reported, Patient   Fexofenadine HCl (ALLEGRA PO) Take 180 mg by mouth daily as needed for allergies More than a month at Unknown time  Unknown, Entered By History   albuterol (PROAIR HFA/PROVENTIL HFA/VENTOLIN HFA) 108 (90 BASE) MCG/ACT Inhaler Inhale 1-2 puffs into the lungs every 4 hours as needed for shortness of breath / dyspnea or wheezing More than a month at Unknown time  Unknown, Entered By History   CYCLOBENZAPRINE HCL PO Take 5 mg by mouth 2 times daily as needed for muscle spasms More than a month at Unknown time  Unknown, Entered By History   Fenofibrate (TRICOR PO) Take 48 mg by mouth daily More than a month at Unknown time  Unknown, Entered By History

## 2018-01-05 NOTE — ED PROVIDER NOTES
History     Chief Complaint:  Psychiatric Evaluation     HPI   Dhruv Shaikh is a 47 year old male who presents to the emergency department today for a psychiatric evaluation. Per EMS report, the patient's mother called the [place] Police [employer] this morning because the patient has been in bed at his apartment for the past five days. When they arrived at the patient's apartment he answered the door wearing women's lingerie and later found a loaded 9 mm handgun with an extended clip next to the patient's bed which they promptly secured. They noted the patient's apartment to be in a state of general disarray with open pill bottles next to the patient's bed and a brown paper bag full of these medications from which the patient would intermittently grab handfuls of medications to ingest. En route to the emergency department, the patient's blood sugar was 215. EMS brought the patient's medication bottles here to the emergency department and per medication labeling, the patient was given 104 tablets of Vicodin and 70 tablets of Klonopin on 12/26/2017 by Mihai Yap, APR. Of the medications that EMS brought here, there are only 16 tablets of Vicodin and 3 tablets of Klonopin left. EMS also notes that the patient was supposed to go to [place] with his family on Monday, 01/01/2018, but did not.     Here in the emergency department the patient reports some mild abdominal pain, decreased appetite, nausea, constipation, and dysuria but denies any vomiting, penile discharge, suicidal ideation, homicidal ideation, hallucinations, alcohol use, marijuana use, street drug use, Tylenol use, Aspirin use, or history of psychiatric admissions. 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 The patient states that he is currently thirsty.     Allergies:  Ace Inhibitors  Dust Mites  Mold  Pollen Extract  Weed [Grass]     Medications:   "  Acetaminophen-Codeine  Atarax  Neurontin  Remeron  Ropinirole  Glucophage  Zyloprim    Past Medical History:    Chronic back pain  Diabetes mellitus  High cholesterol  Hypertension     Past Surgical History:    History reviewed. No pertinent surgical history.    Family History:    History reviewed. No pertinent family history.     Social History:  Smoking Status: Never Smoker  Smokeless Tobacco: Never Used  Alcohol Use: Negative   Marital Status: Single      Review of Systems   Constitutional: Positive for appetite change (Decreased).   Gastrointestinal: Positive for abdominal pain, constipation and nausea. Negative for vomiting.   Genitourinary: Positive for dysuria. Negative for discharge.   Psychiatric/Behavioral: Positive for behavioral problems. Negative for hallucinations and suicidal ideas.   All other systems reviewed and are negative.    Physical Exam     Patient Vitals for the past 24 hrs:   BP Temp Temp src Heart Rate Resp SpO2 Height Weight   01/05/18 1400 - - - 101 20 99 % - -   01/05/18 1330 (!) 155/103 - - 89 (!) 7 99 % - -   01/05/18 1315 (!) 149/107 - - 90 15 - - -   01/05/18 1256 - - - - - - 1.727 m (5' 8\") 110.7 kg (244 lb)   01/05/18 1254 (!) 136/104 98.9  F (37.2  C) Oral 110 18 93 % - -     Physical Exam  Constitutional: Heavyset, disheveled, white male supine  HENT: No signs of trauma.   Eyes: EOM are okay with encouragement. Pupils are equal, round, and reactive to light.   Neck: Normal range of motion. No JVD present. No cervical adenopathy.  Cardiovascular: Regular rhythm.  Exam reveals no gallop and no friction rub.    No murmur heard.  Pulmonary/Chest: Bilateral breath sounds normal. No wheezes, rhonchi or rales.  Abdominal: Soft. No tenderness. No rebound or guarding.   Musculoskeletal: No edema. No tenderness.   Lymphadenopathy: No lymphadenopathy.   Neurological: lehtargic, but oriented to person, place, and time. Normal strength. Coordination normal. Speech is slow and deliberate. " Knows the president. Normal sensation. Normal finger-nose-finger. Gait is stable.  Skin: Skin is warm and dry. No rash noted. No erythema.   Psychiatric: No acute psychosis and no homicidal or suicidal ideation.     Emergency Department Course     ECG:  ECG taken at 1316  Normal sinus rhythm  Normal ECG  Rate 92 bpm. WI interval 130 ms. QRS duration 80 ms. QT/QTc 352/435 ms. P-R-T axes 39 18 -5.    Imaging:  Radiology findings were communicated with the patient who voiced understanding of the findings.    XR Chest 2 Views  Mild linear scarring or atelectasis at the lung bases. No  focal infiltrates. Otherwise negative.   LEAH NICOLE MD  Reading per radiology    Laboratory:  Laboratory findings were communicated with the patient who voiced understanding of the findings.    CBC: WBC 10.3, HGB 15.6,   CMP: Glucose 224 (H) o/w WNL (Creatinine 0.88)  Acetaminophen Level (Collected 1407): <2  Troponin I (Collected 1407): <0.015    Salicylate Level (Collected 1407): <2  Influenza A/B Antigen (Specimen: Nares): Negative      UA: Pending  Urine Drug Screen: Pending    Interventions:  1411 NS 1000 ml IV     Emergency Department Course:    1309 Nursing notes and vitals reviewed.    1316 I performed an exam of the patient as documented above.     1407 IV was inserted and blood was drawn for laboratory testing, results above.     1442 The patient was sent for an XR Chest 2 Views while in the emergency department, results above.      1527 The patient was rechecked and updated. I personally reviewed the laboratory, imaging, and EKG results with the patient and answered all related questions prior to admission.    I discussed the treatment plan with the patient. They expressed understanding of this plan and consented to admission. I discussed the patient with Dr. Fercho Rosas, who will admit the patient to a monitored bed for further evaluation and treatment.     Impression & Plan      Medical Decision Making:  Dhruv MARIE  Hawa is a 47 year old male who was brought in on a welfare check when his mother had not heard from him in many days. He was found to be in bed, disheveled, with pills on the floor. He states that he has chronic back pain and he saw someone before [place] who gave him Klonopin and Vicodin and this is almost all gone. He denies vomiting or headache. His speech though was somewhat slow and he acts a little lethargic. Hemodynamically, though, he has a slight elevation in blood pressure and pulse. He does not appear to be withdrawing. His work up here is essentially negative although urine drug screen is pending but at this point he does not seem like he can care for himself. I did have him seen by mental health. He does not appear to have acute psychosis nor is he a good patient for detox. Since he cannot care for himself we will admit him for further evaluation.     Diagnosis:  1. Inability to care for himself  2. Narcotic and Klonopin abuse  3. Chronic back pain    Disposition:   The patient is admitted into the care of Dr. Rosas.    Scribe Disclosure:  I, Didier Navarro, am serving as a scribe at 1:23 PM on 1/5/2018 to document services personally performed by Drew Barnes MD, based on my observations and the provider's statements to me.     EMERGENCY DEPARTMENT       Drew Barnes MD  01/05/18 3959

## 2018-01-05 NOTE — ED NOTES
Bed: BH3  Expected date:   Expected time:   Means of arrival:   Comments:  532  47 M psych eval  1233

## 2018-01-06 LAB
ANION GAP SERPL CALCULATED.3IONS-SCNC: 6 MMOL/L (ref 3–14)
BUN SERPL-MCNC: 11 MG/DL (ref 7–30)
CALCIUM SERPL-MCNC: 8.9 MG/DL (ref 8.5–10.1)
CHLORIDE SERPL-SCNC: 103 MMOL/L (ref 94–109)
CO2 SERPL-SCNC: 29 MMOL/L (ref 20–32)
CREAT SERPL-MCNC: 0.81 MG/DL (ref 0.66–1.25)
ERYTHROCYTE [DISTWIDTH] IN BLOOD BY AUTOMATED COUNT: 13.2 % (ref 10–15)
GFR SERPL CREATININE-BSD FRML MDRD: >90 ML/MIN/1.7M2
GLUCOSE BLDC GLUCOMTR-MCNC: 164 MG/DL (ref 70–99)
GLUCOSE BLDC GLUCOMTR-MCNC: 177 MG/DL (ref 70–99)
GLUCOSE BLDC GLUCOMTR-MCNC: 182 MG/DL (ref 70–99)
GLUCOSE SERPL-MCNC: 247 MG/DL (ref 70–99)
HBA1C MFR BLD: 7.6 % (ref 4.3–6)
HCT VFR BLD AUTO: 41.1 % (ref 40–53)
HGB BLD-MCNC: 14.5 G/DL (ref 13.3–17.7)
MCH RBC QN AUTO: 32.3 PG (ref 26.5–33)
MCHC RBC AUTO-ENTMCNC: 35.3 G/DL (ref 31.5–36.5)
MCV RBC AUTO: 92 FL (ref 78–100)
PLATELET # BLD AUTO: 277 10E9/L (ref 150–450)
POTASSIUM SERPL-SCNC: 3.5 MMOL/L (ref 3.4–5.3)
RBC # BLD AUTO: 4.49 10E12/L (ref 4.4–5.9)
SODIUM SERPL-SCNC: 138 MMOL/L (ref 133–144)
WBC # BLD AUTO: 8.5 10E9/L (ref 4–11)

## 2018-01-06 PROCEDURE — 25000132 ZZH RX MED GY IP 250 OP 250 PS 637: Performed by: PSYCHIATRY & NEUROLOGY

## 2018-01-06 PROCEDURE — 25000128 H RX IP 250 OP 636: Performed by: INTERNAL MEDICINE

## 2018-01-06 PROCEDURE — 85027 COMPLETE CBC AUTOMATED: CPT | Performed by: INTERNAL MEDICINE

## 2018-01-06 PROCEDURE — 99207 ZZC CDG-CODE CATEGORY CHANGED: CPT | Performed by: INTERNAL MEDICINE

## 2018-01-06 PROCEDURE — 80048 BASIC METABOLIC PNL TOTAL CA: CPT | Performed by: INTERNAL MEDICINE

## 2018-01-06 PROCEDURE — 99225 ZZC SUBSEQUENT OBSERVATION CARE,LEVEL II: CPT | Performed by: INTERNAL MEDICINE

## 2018-01-06 PROCEDURE — 25000132 ZZH RX MED GY IP 250 OP 250 PS 637: Performed by: INTERNAL MEDICINE

## 2018-01-06 PROCEDURE — 83036 HEMOGLOBIN GLYCOSYLATED A1C: CPT | Performed by: INTERNAL MEDICINE

## 2018-01-06 PROCEDURE — 00000146 ZZHCL STATISTIC GLUCOSE BY METER IP

## 2018-01-06 PROCEDURE — 99221 1ST HOSP IP/OBS SF/LOW 40: CPT | Performed by: PSYCHIATRY & NEUROLOGY

## 2018-01-06 PROCEDURE — 40000893 ZZH STATISTIC PT IP EVAL DEFER

## 2018-01-06 PROCEDURE — 25000131 ZZH RX MED GY IP 250 OP 636 PS 637: Performed by: INTERNAL MEDICINE

## 2018-01-06 PROCEDURE — 36415 COLL VENOUS BLD VENIPUNCTURE: CPT | Performed by: INTERNAL MEDICINE

## 2018-01-06 PROCEDURE — G0378 HOSPITAL OBSERVATION PER HR: HCPCS

## 2018-01-06 RX ORDER — METFORMIN HCL 500 MG
1000 TABLET, EXTENDED RELEASE 24 HR ORAL DAILY
Status: DISCONTINUED | OUTPATIENT
Start: 2018-01-06 | End: 2018-01-12 | Stop reason: HOSPADM

## 2018-01-06 RX ORDER — NICOTINE POLACRILEX 4 MG
15-30 LOZENGE BUCCAL
Status: DISCONTINUED | OUTPATIENT
Start: 2018-01-06 | End: 2018-01-12 | Stop reason: HOSPADM

## 2018-01-06 RX ORDER — PHENOBARBITAL 32.4 MG/1
64.8 TABLET ORAL 3 TIMES DAILY
Status: DISCONTINUED | OUTPATIENT
Start: 2018-01-07 | End: 2018-01-08

## 2018-01-06 RX ORDER — QUETIAPINE FUMARATE 25 MG/1
25-50 TABLET, FILM COATED ORAL EVERY 4 HOURS PRN
Status: DISCONTINUED | OUTPATIENT
Start: 2018-01-06 | End: 2018-01-12 | Stop reason: HOSPADM

## 2018-01-06 RX ORDER — PHENOBARBITAL 32.4 MG/1
32.4 TABLET ORAL 2 TIMES DAILY
Status: COMPLETED | OUTPATIENT
Start: 2018-01-06 | End: 2018-01-06

## 2018-01-06 RX ORDER — QUETIAPINE FUMARATE 25 MG/1
25 TABLET, FILM COATED ORAL ONCE
Status: COMPLETED | OUTPATIENT
Start: 2018-01-06 | End: 2018-01-06

## 2018-01-06 RX ORDER — IBUPROFEN 400 MG/1
400 TABLET, FILM COATED ORAL ONCE
Status: COMPLETED | OUTPATIENT
Start: 2018-01-06 | End: 2018-01-06

## 2018-01-06 RX ORDER — GLIPIZIDE 5 MG/1
5 TABLET ORAL
Status: DISCONTINUED | OUTPATIENT
Start: 2018-01-07 | End: 2018-01-07

## 2018-01-06 RX ORDER — DEXTROSE MONOHYDRATE 25 G/50ML
25-50 INJECTION, SOLUTION INTRAVENOUS
Status: DISCONTINUED | OUTPATIENT
Start: 2018-01-06 | End: 2018-01-12 | Stop reason: HOSPADM

## 2018-01-06 RX ADMIN — ALLOPURINOL 300 MG: 300 TABLET ORAL at 10:12

## 2018-01-06 RX ADMIN — SODIUM CHLORIDE: 9 INJECTION, SOLUTION INTRAVENOUS at 17:55

## 2018-01-06 RX ADMIN — PHENOBARBITAL 32.4 MG: 32.4 TABLET ORAL at 21:30

## 2018-01-06 RX ADMIN — CLOMIPRAMINE HYDROCHLORIDE 25 MG: 25 CAPSULE ORAL at 10:12

## 2018-01-06 RX ADMIN — INSULIN ASPART 1 UNITS: 100 INJECTION, SOLUTION INTRAVENOUS; SUBCUTANEOUS at 17:12

## 2018-01-06 RX ADMIN — PROPRANOLOL HYDROCHLORIDE 20 MG: 20 TABLET ORAL at 10:12

## 2018-01-06 RX ADMIN — QUETIAPINE 25 MG: 25 TABLET ORAL at 18:00

## 2018-01-06 RX ADMIN — ASPIRIN 81 MG: 81 TABLET, COATED ORAL at 10:12

## 2018-01-06 RX ADMIN — FENOFIBRATE 54 MG: 54 TABLET ORAL at 10:12

## 2018-01-06 RX ADMIN — GABAPENTIN 600 MG: 600 TABLET, FILM COATED ORAL at 21:31

## 2018-01-06 RX ADMIN — IBUPROFEN 400 MG: 400 TABLET ORAL at 02:01

## 2018-01-06 RX ADMIN — CLONIDINE HYDROCHLORIDE 0.1 MG: 0.1 TABLET ORAL at 21:31

## 2018-01-06 RX ADMIN — SODIUM CHLORIDE: 9 INJECTION, SOLUTION INTRAVENOUS at 07:06

## 2018-01-06 RX ADMIN — QUETIAPINE 25 MG: 25 TABLET ORAL at 23:15

## 2018-01-06 RX ADMIN — POLYETHYLENE GLYCOL 3350 17 G: 17 POWDER, FOR SOLUTION ORAL at 10:13

## 2018-01-06 RX ADMIN — INSULIN ASPART 1 UNITS: 100 INJECTION, SOLUTION INTRAVENOUS; SUBCUTANEOUS at 14:18

## 2018-01-06 RX ADMIN — GABAPENTIN 600 MG: 600 TABLET, FILM COATED ORAL at 10:12

## 2018-01-06 RX ADMIN — CLONAZEPAM 1 MG: 0.5 TABLET ORAL at 10:12

## 2018-01-06 RX ADMIN — METFORMIN HYDROCHLORIDE 1000 MG: 500 TABLET, EXTENDED RELEASE ORAL at 14:04

## 2018-01-06 RX ADMIN — PROPRANOLOL HYDROCHLORIDE 20 MG: 20 TABLET ORAL at 21:30

## 2018-01-06 RX ADMIN — CLONIDINE HYDROCHLORIDE 0.1 MG: 0.1 TABLET ORAL at 10:13

## 2018-01-06 RX ADMIN — PHENOBARBITAL 32.4 MG: 32.4 TABLET ORAL at 14:05

## 2018-01-06 RX ADMIN — ATORVASTATIN CALCIUM 20 MG: 20 TABLET, FILM COATED ORAL at 10:12

## 2018-01-06 RX ADMIN — GABAPENTIN 600 MG: 600 TABLET, FILM COATED ORAL at 17:07

## 2018-01-06 RX ADMIN — QUETIAPINE FUMARATE 25 MG: 25 TABLET ORAL at 02:01

## 2018-01-06 RX ADMIN — LOSARTAN POTASSIUM 100 MG: 50 TABLET ORAL at 10:12

## 2018-01-06 RX ADMIN — TAMSULOSIN HYDROCHLORIDE 0.4 MG: 0.4 CAPSULE ORAL at 10:12

## 2018-01-06 NOTE — PROVIDER NOTIFICATION
MD Notification    Notified Person:  MD Johnson    Notified Persons Name:    Notification Date/Time: 1/6/18 1640    Notification Interaction: text with Physician    Purpose of Notification: BP now 96/57. Wondering about parameters for Propranol.    Orders Received:    Comments:

## 2018-01-06 NOTE — PLAN OF CARE
Problem: Patient Care Overview  Goal: Plan of Care/Patient Progress Review  Outcome: No Change  Patient alert & oriented, flat affect, very anxious, c/o not sleeping and having headache, MD notify, one time dose of Seroquel and ibuprofen ordered and given, vital signs stable on RA, ambulate to bathroom with SBA, IVF infusing at 125 ml/hr.

## 2018-01-06 NOTE — PLAN OF CARE
Problem: Patient Care Overview  Goal: Plan of Care/Patient Progress Review  Outcome: Improving  Pt flat in affect, appears anxious, gets scheduled klonopin. Has been A & O t/o the shift. No c/o pain, VSS, hx of HTN and gets scheduled catapres and inderal. Had a snack of toast and juice, good appetite. Plan for psych consult.

## 2018-01-06 NOTE — PLAN OF CARE
Problem: Patient Care Overview  Goal: Plan of Care/Patient Progress Review  Outcome: Improving  ER admit. Flat affect. Up SBA.  C/O of anxiety.  Wants Klonopin.  Pharmacy verifying meds. HTN noted. Scheduled meds given. C/O of chronic back pain, declines meds. IVF. [facility] to see. Declines food at this time. D/C pending consults and safe disposition.

## 2018-01-06 NOTE — PROGRESS NOTES
Patient placed on 72hr hold by Psychiatry.  Rights read and given to patient. 2 bags of belongings (clothes/shoes) and cell phone locked in cabinet 9.  Sitter at bedside.  Pt remains anxious but cooperative.

## 2018-01-06 NOTE — CONSULTS
"PSYCHIATRIC CONSULTATION      DATE OF SERVICE:  01/06/2018      REQUESTING PHYSICIAN:  Fercho Rosas MD      REASON FOR CONSULTATION:  Severe anxiety.      IDENTIFYING DATA:  Dhruv Shaikh is a 47-year-old gentleman who came in to the emergency room with altered mental status, treated with multiple medications, including high doses of Klonopin and narcotics.  His mother called the [place] Police to do a welfare check.      CHIEF COMPLAINT:  \"I want to leave.\"      HISTORY OF PRESENT ILLNESS:  Mr. Shaikh is a 47-year-old gentleman who has chronic low back pain and a very well-established history of chemical dependency.  He has been going to a pain clinic and has been getting at least 3 mg of Klonopin daily and large quantities of Norco.  It looks to me like he recently got 100 Vicodin and 70 Klonopin tablets.  He was brought in on a  hold.  His mother sent the police there to do a welfare check because she could not get a hold of him.  When they got to the scene, things were in complete disarray.  The patient was very altered, disheveled and bedbound.  He was admitted to the medical floor for further stabilization.  His drug screen shows benzodiazepines and opiates.  He has been detoxed multiple times and we have had a number of contacts with him in the system with similar concerns.  He has a history of being on amphetamines/stimulants and chronically abusing narcotics and Klonopin, stating those are the only medications that work for his anxiety and back pain.  I do not believe he has actually had formal treatment, but we have done multiple consults on him.      On interview this morning, the patient is nodding off.  He is completely disheveled, glassy-eyed and barely able to provide a coherent history.  His speech is dysarthric.  He is telling me that he needs to stay on his Klonopin for his anxiety, but he cannot stay awake during my visit.  According to my review of records, it looks to me like " "he has been diagnosed with anxiety issues in the past and one of his medications prior to admission was Anafranil in addition to gabapentin and 3 controlled substances, Ambien, Klonopin and opiates.  He claims that they are trying to \"taper him off of medications,\" but this pattern has repeated itself over and over in the chart where he gets detoxed and is told that he needs to get off these medications and does not follow through with those recommendations.  He denies that he wants to hurt himself or others and told me flatly that he wanted to leave and did not feel like he needed to be in the hospital.  We last saw him for a consultation back in 05/2016 and he was admitted to Psychiatry here at [facility] back in 01/2016.  He was put on Remeron at that time, stayed on some gabapentin, propranolol, hydroxyzine and seemed to have improvement in his overall presentation.  I also note that he has had a history of seizures in the past, and I did a consult on him back in 05/2016.  He looks distracted.  He is oriented to the hospital.  His ability to give me a good history is really impaired at this point.  He denies he is hallucinating and denies any thoughts of wanting to hurt self or others.      PAST PSYCHIATRIC HISTORY:  As above.  A diagnosis of major depression, anxiety and possible psychotic disorder are mentioned in the chart based on past encounters with him.  Per chart review, he has been on multiple psychotropics, like Prozac, Paxil, Zoloft, Celexa, Wellbutrin, Effexor, Xanax, Klonopin, Ativan, Valium, BuSpar, Ambien, Lunesta, trazodone and lithium.      CHEMICAL DEPENDENCY HISTORY:  Notable for polysubstance dependence.  He has abused opiates, benzodiazepines and stimulants in the past.  The patient has been detoxed at [facility], and it looks like he may have been at [facility] at one point for alcohol abuse in the past.      PAST MEDICAL HISTORY:  Per chart review, chronic back pain, " diabetes, hypercholesterolemia, hypertension.      LISTED MEDICATIONS ON ADMISSION:  Include 18 different substances, includin.  Albuterol.   2.  Allopurinol.   3.  Aspirin.   4.  Atorvastatin.   5.  Clomipramine 25 mg daily.   6.  Klonopin 1 mg t.i.d.   7.  Clonidine 0.1 mg b.i.d.   8.  Cyclobenzaprine.   9.  Fenofibrate.   10.  Fexofenadine.   11.  Gabapentin 600 mg t.i.d.   12.  Lortab 1 tablet of 10/500 every 6 hours p.r.n.   13.  Losartan.   14.  Metformin.   15.  MiraLax.   16.  Propranolol.   17.  Flomax.   18.  Ambien 10 mg at bedtime.      FAMILY HISTORY AND SOCIAL HISTORY:  I know the patient has been living independently.  There is not a defined history of mental illness or chemical dependency in the family.  He grew up in the [facility], the oldest of 2 kids, and his dad  when he was 15.  He is a high school graduate and had a couple of years of college but had to then give up his  school because of panic attacks.  He did attend [employer].  He has been unemployed and living independently and his mother called the authorities when she could not get a hold of him.      REVIEW OF SYSTEMS:  A 10-point review of systems is notable for some somnolence.  He is nodding off during the interview on neurologic exam.  The remainder of the exam is negative other than his subjective complaints of back pain on musculoskeletal exam.      MOST RECENT VITAL SIGNS:  Temp 97.3, heart rate 57, respiratory rate 18, blood pressure 128/84, oxygen saturation 100%.      MENTAL STATUS EXAMINATION:  Appearance:  The patient is a very disheveled gentleman who nods off repeatedly during the visit.  He is unshaven and malodorous.  Speech is soft, slightly dysarthric, slow.  Use of language poor.  Motor exam is slightly somnolent.  Muscle strength and tone diminished.  Coordination, station and gait are severely impaired.  Affect is blunted.  Mood subjectively anxious.  Thought process is delayed.  He responds to  questions briefly, generally in a logical and coherent manner with no flight of ideas, loosening of associations or formal thought disorder.  Thought content negative for current hallucinations, delusions, paranoia, suicidal or homicidal ideation.  Insight and judgment severely impaired.  Cognitive exam:  The patient is slightly somnolent, rouses to voice, nods off repeatedly.  Concentration severely impaired.  Recent memory impaired.  Remote memory moderately impaired.  General fund of knowledge delayed.      IMPRESSION:  The patient is a 47-year-old man presenting with a delirium related to polydrug use.  We will start him on phenobarbital to ease him off of Klonopin.  Opiates will be discontinued.  He is on clonidine, which can be utilized for opiate withdrawal.  I am going to initiate a 72-hour hold and a commitment.  He is a danger to himself; he cannot take care of himself and has very poor insight.  He is putting himself in great peril, and repeated attempts to get him to stay away from illicit substances have failed.  It is unclear to me whether he has an underlying mood/psychotic disorder, but this certainly deserves clarification.  I wrote for some p.r.n. Seroquel.  Anafranil has been discontinued.      DIAGNOSES:   1.  Delirium secondary to polydrug use.   2.  Anxiety disorder, not otherwise specified, by history.   3.  Rule out mood/psychotic disorder versus substance-induced mood/psychotic disorder.   4.  Opiate use disorder.   5.  Benzodiazepine use disorder.      PLAN:   1.  Start phenobarbital 64.8 mg t.i.d.  I will dose him with 32.4 mg b.i.d. for the remainder of today, as he is still somnolent and got some Klonopin this morning.   2.  Discontinue all benzodiazepines and opiates.   3.  Discontinue Anafranil.   4.  I wrote for some p.r.n. Seroquel 25 to 50 mg q.6 hours p.r.n. for anxiety.   5.  Psychiatry will follow with you.   6.  I started him on a 72-hour hold, as he is demanding to leave.  He  cannot take care of himself.  He needs a commitment process, and I filled out a support statement for [facility].         ORA LOPEZ MD             D: 2018 11:13   T: 2018 13:22   MT: KATIE      Name:     AWRREN MANDUJANO   MRN:      [id number]        Account:       [id number]   :      1970           Consult Date:  2018      Document: [id number]       cc: Ora Rosas MD

## 2018-01-06 NOTE — H&P
PRIMARY CARE PROVIDER:  Uvaldo Mishra MD, [facility].      CHIEF COMPLAINT:  Psychiatric evaluation.      HISTORY OF PRESENT ILLNESS:  Dhruv Shaikh is a 47-year-old gentleman with chronic low back pain.  He has been going to a pain clinic for a number of years.  He is on Klonopin and Norco for a number of years.  The patient's mother could not get ahold of him for a number of days, and so she called [place] Police for a welfare check.  The patient was found to be in bed, disheveled, with the apartment in disarray.  The patient recently got 100 of Vicodin and 70 of Klonopin.  The patient was brought in by a  Hold to [facility].      In the Emergency Department, the patient was seen by Dr. Drew Barnes.  The patient's Tylenol and aspirin levels were negative.  Chest x-ray showed linear atelectasis, but no infiltrate.  CBC was normal.  Basic metabolic panel was normal.  Blood glucose was 224.  Influenza screen was negative.  The patient is being admitted under observation status for home safety evaluation and psychiatric evaluation.      PAST MEDICAL HISTORY:   1.  Chronic back pain.  The patient has been on narcotics for about the past 11 years.   2.  Diabetes.  The patient is on metformin   3.  Hypercholesterolemia   4.  Hypertension.      ALLERGIES:  ACE inhibitors, dust mites, mold, pollen extract, weeds.      CURRENT MEDICATION LIST:   1.  Albuterol metered dose inhaler 1-2 puffs every 4 hours.   2.  Allopurinol 300 mg once a day.   3.  Aspirin 81 mg once a day.   4.  Atorvastatin 20 mg once a day.   5.  Clomipramine 25 mg once a day.   6.  Clonazepam 1 mg three times a day.   7.  Clonidine 0.1 mg twice a day.   8.  Cyclobenzaprine 5 mg b.i.d.   9.  Fenofibrate 40 mg once a day.   10.  Fexofenadine 180 mg once a day.   11.  Gabapentin 600 mg three times a day.   12.  Lortab (10/500) 1 tablet every 6 hours.   13.  Losartan 100 mg once a day.   14.   Metformin XR 1000 mg once a day.   15.  MiraLax 1 packet once a day.   16.  Propranolol 20 mg three times a day.   17.  Flomax 0.4 mg once a day.   18.  Ambien 10 mg at bedtime.      REVIEW OF SYSTEMS:  Ten-point system reviewed.  The patient currently denies any cardiopulmonary or GI or  complaints.  He just feels tired and does not feel like doing anything.  He states his appetite has been poor.  Otherwise, 10-point system reviewed.      PHYSICAL EXAM:   VITAL SIGNS:  Temperature 98.1, heart rate 77, respirations 18, blood pressure 121/87, sats 96% on room air.   GENERAL:  The patient is a very disheveled, groggy, 47-year-old gentleman who is oriented x3.   HEENT:  Pupils are equal.  Sclerae are anicteric.  Mucous membranes are dry.   NECK:  Veins are not distended.   LUNGS:  Clear to auscultation.   CARDIOVASCULAR:  S1, S2, regular rate and rhythm.   ABDOMEN:  Obese.  No focal tenderness, no guarding or rebound.   EXTREMITIES:  No edema.   NEUROLOGIC:  He is able to move all four extremities.  Cranial nerves are grossly intact.  He is somnolent.      LABS AND STUDIES:  As dictated in history of present illness.      ASSESSMENT:  Dhruv Shaikh is a 47-year-old gentleman with suspected chronic drug use in the form of prescription Klonopin and Vicodin, being admitted with altered sensorium, being admitted under observation status.      PLAN:   1.  Chronic drug use with Vicodin and Klonopin:  The patient has been mostly bedbound for the last four days.  The patient received normal saline in the ED.  The patient will continue IV fluids, and will be monitored on telemetry overnight.  The patient will be seen by Psychiatry tomorrow, and will be seen by Physical Therapy for home safety.   2.  Chronic back pain:  We will continue the patient on his Norco for pain as needed.   3.  Diabetes:  We will continue the patient on metformin.   4.  Hypertension:  We will continue the patient on his clonidine and propranolol and  losartan.   5.  History of allergies:  We will continue the patient on Allegra.   6.  History of gout:  We will continue the patient on allopurinol.   7.  History of hyperlipidemia:  We will continue the patient on atorvastatin and fenofibrate.   8.  DVT prophylaxis:  The patient will wear compression boots.   9.  CODE STATUS:  FULL.   10.  Observation status.         ORA GARZA MD             D: 2018 23:56   T: 2018 02:29   MT: #[id number]      Name:     WARREN MANDUJANO   MRN:      [id number]        Account:      [id number]   :      1970           Admitted:     [id number]      Document: [id number]       cc: Uvaldo Mishra MD

## 2018-01-06 NOTE — PROGRESS NOTES
[facility]    Hospitalist Progress Note    Assessment & Plan   Dhruv Shaikh is a 47-year-old gentleman with chronic narcotic dependence presents with inability to take care of himself.    Chronic narcotic and benzodiazepine dependence:  -patient has a long-standing history of narcotic and benzodiazepine dependence  -reportedly also has history of poly substance dependence and major depression  -discussed with Dr. Luciano from psychiatry  -given multiple evaluations for the same problem and ongoing dependence, plan is to commit him and attempt detoxification with phenobarbital  -eventual transfer to psychiatry unit.  -Discontinue prior to admission narcotics and benzodiazepines    Chronic back pain:    -will try non- narcotics for pain control  -please see discussion above    Diabetes mellitus type II:    -We will continue PTA metformin.   -Hemoglobin A-1 C7 .6  -add glipizide 5 mg daily  -low-dose sliding scale    Hypertension:    -We will continue the patient on his clonidine, propranolol and losartan.       History of gout:    -Continue  allopurinol.     History of hyperlipidemia:    -We will continue the patient on atorvastatin and fenofibrate.       D/W: RN  DVT Prophylaxis: Pneumatic Compression Devices  Code Status: Full Code    Disposition: Expected discharge in 2 + days    Yang Johnson MD    Interval History   patient denies new complaints. Continues to be withdrawn.    -Data reviewed today: I reviewed all new labs and imaging results over the last 24 hours. I personally reviewed no images or EKG's today.    Physical Exam   Temp: 97.3  F (36.3  C) Temp src: Oral BP: 128/84 Pulse: 77 Heart Rate: 57 Resp: 18 SpO2: 100 % O2 Device: None (Room air)    Vitals:    01/05/18 1256   Weight: 110.7 kg (244 lb)     Vital Signs with Ranges  Temp:  [97.3  F (36.3  C)-98.9  F (37.2  C)] 97.3  F (36.3  C)  Pulse:  [] 77  Heart Rate:  [] 57  Resp:  [14-20] 18  BP: (109-155)/()  128/84  SpO2:  [93 %-100 %] 100 %  I/O last 3 completed shifts:  In: 1000 [IV Piggyback:1000]  Out: -     Constitutional: AAOX3, withdrawn, flat affect   HEENT: Moist oral mucosa, no oral lesions, No pallor or icterus  Neck- Supple, Good ROM, No JVD  Respiratory:  No crackles, No wheezes, CTA B/L, Normal WOB  Cardiovascular: RRR, No murmur  GI: Soft, Non- tender, BS- normoactive, No Guarding/rebound/rigidity  Skin/Integument: Warm and dry, no rashes  MSK: No joint deformity or swelling, no edema  Neuro: CN- grossly intact     Medications     NaCl       NaCl 100 mL/hr at 01/06/18 0706       [START ON 1/7/2018] PHENobarbital  64.8 mg Oral TID     PHENobarbital  32.4 mg Oral BID     allopurinol  300 mg Oral Daily     aspirin EC EC tablet 81 mg  81 mg Oral Daily     atorvastatin (LIPITOR) tablet 20 mg  20 mg Oral Daily     cloNIDine (CATAPRES) tablet 0.1 mg  0.1 mg Oral BID     fenofibrate  54 mg Oral Daily     gabapentin (NEURONTIN) tablet 600 mg  600 mg Oral TID     losartan (COZAAR) tablet 100 mg  100 mg Oral Daily     polyethylene glycol  17 g Oral Daily     propranolol (INDERAL) tablet 20 mg  20 mg Oral TID     tamsulosin  0.4 mg Oral Daily       Data     Recent Labs  Lab 01/06/18  1053 01/05/18  1407   WBC 8.5 10.3   HGB 14.5 15.6   MCV 92 90    287    135   POTASSIUM 3.5 3.4   CHLORIDE 103 99   CO2 29 27   BUN 11 12   CR 0.81 0.88   ANIONGAP 6 9   WILDER 8.9 9.3   * 224*   ALBUMIN  --  4.2   PROTTOTAL  --  7.6   BILITOTAL  --  0.8   ALKPHOS  --  140   ALT  --  39   AST  --  22   TROPI  --  <0.015       Recent Results (from the past 24 hour(s))   XR Chest 2 Views    Narrative    CHEST TWO VIEWS  1/5/2018 2:53 PM    HISTORY:  Weak.     COMPARISON:  None.      Impression    IMPRESSION:  Mild linear scarring or atelectasis at the lung bases. No  focal infiltrates. Otherwise negative.     LEAH NICOLE MD

## 2018-01-06 NOTE — PLAN OF CARE
Problem: Patient Care Overview  Goal: Plan of Care/Patient Progress Review  Physical Therapy: Order received and chart reviewed. Pt is a 47 year old male admitted under OBS status for psych evaluation due to inability to care for himself in his apartment.   Per discussion with RN, patient having psych consult this date. Per notes and RN, patient is up SBA with RN staff. Due to patient needing assist at discharge for psych reasons, will cancel PT evaluation at this time as patient will have SBA at discharge at next level of care. Will complete PT order at this time and defer all functional mobility to RN staff.

## 2018-01-07 LAB
GLUCOSE BLDC GLUCOMTR-MCNC: 101 MG/DL (ref 70–99)
GLUCOSE BLDC GLUCOMTR-MCNC: 147 MG/DL (ref 70–99)
GLUCOSE BLDC GLUCOMTR-MCNC: 155 MG/DL (ref 70–99)
GLUCOSE BLDC GLUCOMTR-MCNC: 187 MG/DL (ref 70–99)
GLUCOSE BLDC GLUCOMTR-MCNC: 47 MG/DL (ref 70–99)
GLUCOSE BLDC GLUCOMTR-MCNC: 60 MG/DL (ref 70–99)
GLUCOSE BLDC GLUCOMTR-MCNC: 68 MG/DL (ref 70–99)
GLUCOSE BLDC GLUCOMTR-MCNC: 71 MG/DL (ref 70–99)
GLUCOSE BLDC GLUCOMTR-MCNC: 80 MG/DL (ref 70–99)
GLUCOSE BLDC GLUCOMTR-MCNC: 90 MG/DL (ref 70–99)
GLUCOSE BLDC GLUCOMTR-MCNC: 91 MG/DL (ref 70–99)
GLUCOSE BLDC GLUCOMTR-MCNC: 99 MG/DL (ref 70–99)

## 2018-01-07 PROCEDURE — 40000914 ZZH STATISTIC SITTER, DAY HOURS

## 2018-01-07 PROCEDURE — 25000132 ZZH RX MED GY IP 250 OP 250 PS 637: Performed by: PSYCHIATRY & NEUROLOGY

## 2018-01-07 PROCEDURE — 25000132 ZZH RX MED GY IP 250 OP 250 PS 637: Performed by: INTERNAL MEDICINE

## 2018-01-07 PROCEDURE — 00000146 ZZHCL STATISTIC GLUCOSE BY METER IP

## 2018-01-07 PROCEDURE — 99225 ZZC SUBSEQUENT OBSERVATION CARE,LEVEL II: CPT | Performed by: INTERNAL MEDICINE

## 2018-01-07 PROCEDURE — 25000128 H RX IP 250 OP 636: Performed by: INTERNAL MEDICINE

## 2018-01-07 PROCEDURE — 40000915 ZZH STATISTIC SITTER, EVENING HOURS

## 2018-01-07 PROCEDURE — G0378 HOSPITAL OBSERVATION PER HR: HCPCS

## 2018-01-07 PROCEDURE — 99232 SBSQ HOSP IP/OBS MODERATE 35: CPT | Performed by: PSYCHIATRY & NEUROLOGY

## 2018-01-07 RX ORDER — QUETIAPINE FUMARATE 100 MG/1
100 TABLET, FILM COATED ORAL AT BEDTIME
Status: DISCONTINUED | OUTPATIENT
Start: 2018-01-07 | End: 2018-01-09

## 2018-01-07 RX ADMIN — PROPRANOLOL HYDROCHLORIDE 20 MG: 20 TABLET ORAL at 10:01

## 2018-01-07 RX ADMIN — ASPIRIN 81 MG: 81 TABLET, COATED ORAL at 10:01

## 2018-01-07 RX ADMIN — PROPRANOLOL HYDROCHLORIDE 20 MG: 20 TABLET ORAL at 21:41

## 2018-01-07 RX ADMIN — CLONIDINE HYDROCHLORIDE 0.1 MG: 0.1 TABLET ORAL at 21:42

## 2018-01-07 RX ADMIN — POLYETHYLENE GLYCOL 3350 17 G: 17 POWDER, FOR SOLUTION ORAL at 10:02

## 2018-01-07 RX ADMIN — ATORVASTATIN CALCIUM 20 MG: 20 TABLET, FILM COATED ORAL at 10:01

## 2018-01-07 RX ADMIN — PROPRANOLOL HYDROCHLORIDE 20 MG: 20 TABLET ORAL at 16:28

## 2018-01-07 RX ADMIN — ALLOPURINOL 300 MG: 300 TABLET ORAL at 10:01

## 2018-01-07 RX ADMIN — GABAPENTIN 600 MG: 600 TABLET, FILM COATED ORAL at 21:41

## 2018-01-07 RX ADMIN — SODIUM CHLORIDE: 9 INJECTION, SOLUTION INTRAVENOUS at 04:14

## 2018-01-07 RX ADMIN — QUETIAPINE FUMARATE 100 MG: 100 TABLET ORAL at 21:42

## 2018-01-07 RX ADMIN — FENOFIBRATE 54 MG: 54 TABLET ORAL at 10:01

## 2018-01-07 RX ADMIN — CLONIDINE HYDROCHLORIDE 0.1 MG: 0.1 TABLET ORAL at 10:01

## 2018-01-07 RX ADMIN — METFORMIN HYDROCHLORIDE 1000 MG: 500 TABLET, EXTENDED RELEASE ORAL at 10:01

## 2018-01-07 RX ADMIN — GLIPIZIDE 5 MG: 5 TABLET ORAL at 10:02

## 2018-01-07 RX ADMIN — PHENOBARBITAL 64.8 MG: 32.4 TABLET ORAL at 10:01

## 2018-01-07 RX ADMIN — PHENOBARBITAL 64.8 MG: 32.4 TABLET ORAL at 16:28

## 2018-01-07 RX ADMIN — GABAPENTIN 600 MG: 600 TABLET, FILM COATED ORAL at 10:01

## 2018-01-07 RX ADMIN — INSULIN ASPART 1 UNITS: 100 INJECTION, SOLUTION INTRAVENOUS; SUBCUTANEOUS at 10:02

## 2018-01-07 RX ADMIN — QUETIAPINE 25 MG: 25 TABLET ORAL at 15:05

## 2018-01-07 RX ADMIN — TAMSULOSIN HYDROCHLORIDE 0.4 MG: 0.4 CAPSULE ORAL at 10:01

## 2018-01-07 RX ADMIN — PHENOBARBITAL 64.8 MG: 32.4 TABLET ORAL at 21:41

## 2018-01-07 RX ADMIN — GABAPENTIN 600 MG: 600 TABLET, FILM COATED ORAL at 16:28

## 2018-01-07 NOTE — PROGRESS NOTES
[facility]    Hospitalist Progress Note    Assessment & Plan   Dhruv Shaikh is a 47-year-old gentleman with chronic narcotic dependence presents with inability to take care of himself.    Chronic narcotic and benzodiazepine dependence:  -patient has a long-standing history of narcotic and benzodiazepine dependence  -reportedly also has history of poly substance dependence and major depression  -discussed with Dr. Luciano from psychiatry  -given multiple evaluations for the same problem and ongoing dependence, pt now committed. Attempting detoxification with phenobarbital  -eventual transfer to psychiatry unit/inpt detox facility.  -Discontinue prior to admission narcotics and benzodiazepines    Diabetes mellitus type II:    -Continue PTA metformin.   -Hemoglobin A-1 C7 .6  -continue glipizide 5 mg daily(started 1/6)    Hypertension:    -Continue PTA clonidine and propranolol   -hold losartan(BP on the softer side) .     History of gout:    -Continue  allopurinol.     History of hyperlipidemia:    -Continue  atorvastatin and fenofibrate.       D/W: RN  DVT Prophylaxis: Pneumatic Compression Devices  Code Status: Full Code    Disposition: Expected discharge in 2 + days    Yang Johnson MD    Interval History   BGs better controlled. Poor sleep. No evidence of overt withdrawal    -Data reviewed today: I reviewed all new labs and imaging results over the last 24 hours. I personally reviewed no images or EKG's today.    Physical Exam   Temp: 98.1  F (36.7  C) Temp src: Oral BP: 114/72 Pulse: 73 Heart Rate: 75 Resp: 14 SpO2: 96 % O2 Device: None (Room air)    Vitals:    01/05/18 1256   Weight: 110.7 kg (244 lb)     Vital Signs with Ranges  Temp:  [98  F (36.7  C)-98.1  F (36.7  C)] 98.1  F (36.7  C)  Pulse:  [73-76] 73  Heart Rate:  [75] 75  Resp:  [14-18] 14  BP: ()/(52-72) 114/72  SpO2:  [94 %-96 %] 96 %  I/O last 3 completed shifts:  In: 3772 [P.O.:640; I.V.:3132]  Out: -      Constitutional: AAOX3, withdrawn, flat affect   Neck- Supple, Good ROM, No JVD  Respiratory:  No crackles, No wheezes, CTA B/L, Normal WOB  Cardiovascular: RRR, No murmur  GI: Soft, Non- tender, BS- normoactive, No Guarding/rebound/rigidity  Skin/Integument: Warm and dry, no rashes  MSK: No joint deformity or swelling, no edema  Neuro: CN- grossly intact; no tremors    Medications        QUEtiapine  100 mg Oral At Bedtime     PHENobarbital  64.8 mg Oral TID     metFORMIN  1,000 mg Oral Daily     glipiZIDE  5 mg Oral QAM AC     insulin aspart  1-3 Units Subcutaneous TID AC     insulin aspart  1-3 Units Subcutaneous At Bedtime     allopurinol  300 mg Oral Daily     aspirin EC EC tablet 81 mg  81 mg Oral Daily     atorvastatin (LIPITOR) tablet 20 mg  20 mg Oral Daily     cloNIDine (CATAPRES) tablet 0.1 mg  0.1 mg Oral BID     fenofibrate  54 mg Oral Daily     gabapentin (NEURONTIN) tablet 600 mg  600 mg Oral TID     polyethylene glycol  17 g Oral Daily     propranolol (INDERAL) tablet 20 mg  20 mg Oral TID     tamsulosin  0.4 mg Oral Daily       Data     Recent Labs  Lab 01/06/18  1053 01/05/18  1407   WBC 8.5 10.3   HGB 14.5 15.6   MCV 92 90    287    135   POTASSIUM 3.5 3.4   CHLORIDE 103 99   CO2 29 27   BUN 11 12   CR 0.81 0.88   ANIONGAP 6 9   WILDER 8.9 9.3   * 224*   ALBUMIN  --  4.2   PROTTOTAL  --  7.6   BILITOTAL  --  0.8   ALKPHOS  --  140   ALT  --  39   AST  --  22   TROPI  --  <0.015       No results found for this or any previous visit (from the past 24 hour(s)).

## 2018-01-07 NOTE — PROGRESS NOTES
Patient had been refusing food around lunch time due to late breakfast meal. BGM checked at 1420 and 60.  OJ given and sitter rechecking BGM's [id number].  Patient encouraged to eat crackers and juice.

## 2018-01-07 NOTE — PLAN OF CARE
Problem: Patient Care Overview  Goal: Plan of Care/Patient Progress Review  Outcome: No Change  Pt A&O x3. Flat affect. Lethargic, withdrawn. Up with SBA. Denies pain. Rash bilateral armpits. I/O adequate. 72 hr hold, sitter at bedside. Pt C/o inablility to sleep, then falls back to sleep within 10 mins., 155. Vs soft, on RA. Seroquel given x1 for c/o restless leg. Psych consult compl.

## 2018-01-07 NOTE — PLAN OF CARE
"Problem: Patient Care Overview  Goal: Plan of Care/Patient Progress Review  Outcome: No Change  VSS. Pt denies pain. Lethargic/flat affect. Sleeping much of shift. C/o of anxiety but then would fall back asleep. 25mg seroquel given x1 late in shift when more awake and anxious. Needs encouragement to eat and get up and at other time tells staff to \"leave him alone\". Tolerating regular diet. , SSI. IVF, now SL 72hr hold. Sitter at bedside. Up SBA. Good UOP.  D/C pending commitment process.       "

## 2018-01-07 NOTE — PROGRESS NOTES
SW:     consulted by Dr Luciano to complete paper work for Commitment.  Will contact contact [facility] on Monday and complete paperwork

## 2018-01-07 NOTE — CONSULTS
"[facility] Psychiatric Consult Progress Note    Interval History:   Pt seen, chart reviewed, case discussed with nursing staff and treating clinicians.  Dhruv says he slept poorly.  He continues to look very disheveled with a blank stare on his face.  He seems to be blinking repeatedly.  Staff noticed at times he is somewhat somnolent, he was alert with me but has very poor insight.  He keeps asking when he can go home.  I explained to him that he is on a 72 hour hold, that we have filed a commitment, he will be talking with the county in the next day or so.  He does not seem to comprehend the need for that, thinks \"my mom will just take care of me\".  He told me that he was put on Anafranil for OCD symptoms, does not complain of those symptoms and says \"it did not work anyway\".  He has interest in being on a little bit more Seroquel which he says helps his sleep, and requests a 100 mg dose.     Review of systems:   10 point Review of Systems completed by Dr. Luciano, and is  is negative other than noted in the HPI     Medications:       QUEtiapine  100 mg Oral At Bedtime     PHENobarbital  64.8 mg Oral TID     metFORMIN  1,000 mg Oral Daily     glipiZIDE  5 mg Oral QAM AC     insulin aspart  1-3 Units Subcutaneous TID AC     insulin aspart  1-3 Units Subcutaneous At Bedtime     allopurinol  300 mg Oral Daily     aspirin EC EC tablet 81 mg  81 mg Oral Daily     atorvastatin (LIPITOR) tablet 20 mg  20 mg Oral Daily     cloNIDine (CATAPRES) tablet 0.1 mg  0.1 mg Oral BID     fenofibrate  54 mg Oral Daily     gabapentin (NEURONTIN) tablet 600 mg  600 mg Oral TID     polyethylene glycol  17 g Oral Daily     propranolol (INDERAL) tablet 20 mg  20 mg Oral TID     tamsulosin  0.4 mg Oral Daily     QUEtiapine, glucose **OR** dextrose **OR** glucagon, albuterol, fexofenadine (ALLEGRA) tablet 180 mg, acetaminophen, acetaminophen, naloxone, ondansetron **OR** ondansetron    Mental Status Examination: "     Appearance:  appeared older than stated age, poorly groomed and fatigued  Eye Contact:  intense, Stares blankly, repeatedly blinking  Speech:  dysarthria, decreased prosody and paucity of speech  Language:Normal  Psychomotor Behavior:  evidence of tics and Blinking repeatedly  Mood:  anxious  Affect:  intensity is blunted and intensity is flat  Thought Process:  logical, linear and goal oriented no loose associations, perseverates about wanting to leave  Thought Content:  no evidence of suicidal ideation or homicidal ideation, no evidence of psychotic thought and Impoverished  Oriented to:  time, person, and place  Attention Span and Concentration:  limited  Recent and Remote Memory:  limited  Fund of Knowledge: Poor  Muscle Strength and Tone: normal  Gait and Station: Normal  Insight:  limited  Judgment:  poor        Labs/Vitals:     Recent Results (from the past 24 hour(s))   Glucose by meter    Collection Time: 01/06/18  1:09 PM   Result Value Ref Range    Glucose 164 (H) 70 - 99 mg/dL   Glucose by meter    Collection Time: 01/06/18  5:10 PM   Result Value Ref Range    Glucose 182 (H) 70 - 99 mg/dL   Glucose by meter    Collection Time: 01/06/18  9:07 PM   Result Value Ref Range    Glucose 177 (H) 70 - 99 mg/dL   Glucose by meter    Collection Time: 01/07/18  1:57 AM   Result Value Ref Range    Glucose 155 (H) 70 - 99 mg/dL   Glucose by meter    Collection Time: 01/07/18  9:25 AM   Result Value Ref Range    Glucose 147 (H) 70 - 99 mg/dL     B/P: 114/72, T: 98.1, P: 73, R: 14    Impression:   Dhruv continues to display very poor insight.  We will continue with a phenobarbital taper and avoid narcotics.  He does not seem to be in any overt opiate withdrawal at this point, clonidine is being dosed at 0.1 mg twice daily.  At this point I do not think I am going to add any additional medicine for opiate detoxification.  Prepetition screening will likely see him in the next day or so.      DIagnoses:   1.  Delirium  secondary to polypharmacy  2.  Opiate use disorder  3.  Benzodiazepine use disorder  4.  Anxiety disorder not otherwise specified  5.  Rule out mood disorder with psychotic features  6.  Rule out intellectual disability         Plan:   1. Written information given on medications. Side effects, risks, benefits reviewed.  2.  Continue phenobarbital taper, he is currently on 64.8 mg 3 times daily  3.  Continue clonidine 0.1 mg twice daily  4.  Increase Seroquel to 100 mg nightly  5.  Continue gabapentin, Anafranil has been discontinued.  6.  Continue 72 hour hold, commitment paperwork filed      Attestation:  Patient has been seen and evaluated by me,  Fercho Luciano MD

## 2018-01-07 NOTE — PLAN OF CARE
Problem: Patient Care Overview  Goal: Plan of Care/Patient Progress Review  Outcome: No Change  VSS. Pt lethargic at times. Arouses to voice. Flat affect, withdrawn. [facility] saw and placed on 72hr hold. Sitter at bedside. Up with 1. Cooperative.  Skin dry/flaky. Rash under armpits. Shower given. /182, SSI started. Metformin resumed. IVF. Tolerating regular diet. C/o of anxiety and frequently requesting medications but then would fall back asleep.  25mg Seroquel given x1. D/C pending SW/disposition/Commitment.

## 2018-01-07 NOTE — PROVIDER NOTIFICATION
MD Notification    Notified Person:  MD Johnson    Notified Persons Name:    Notification Date/Time: 1445 1/7    Notification Interaction:  text with Physician    Purpose of Notification: Pt's BGM was 60, Juice crackers given.  Recheck 15 minutes later was 47.  Pt remains alert. More juice/crackers given. Food ordered. Wanted to notify MD and clarification if MD wants to change any oral medications patient was started on today.     Orders Received:  MD is d/c Glipizide and d/c sliding scale.  Continue BGM's TID      Comments:

## 2018-01-08 LAB
GLUCOSE BLDC GLUCOMTR-MCNC: 137 MG/DL (ref 70–99)
GLUCOSE BLDC GLUCOMTR-MCNC: 163 MG/DL (ref 70–99)
GLUCOSE BLDC GLUCOMTR-MCNC: 164 MG/DL (ref 70–99)
GLUCOSE BLDC GLUCOMTR-MCNC: 182 MG/DL (ref 70–99)

## 2018-01-08 PROCEDURE — 99233 SBSQ HOSP IP/OBS HIGH 50: CPT | Performed by: PSYCHIATRY & NEUROLOGY

## 2018-01-08 PROCEDURE — G0378 HOSPITAL OBSERVATION PER HR: HCPCS

## 2018-01-08 PROCEDURE — 25000132 ZZH RX MED GY IP 250 OP 250 PS 637: Performed by: INTERNAL MEDICINE

## 2018-01-08 PROCEDURE — 25000132 ZZH RX MED GY IP 250 OP 250 PS 637: Performed by: PSYCHIATRY & NEUROLOGY

## 2018-01-08 PROCEDURE — 00000146 ZZHCL STATISTIC GLUCOSE BY METER IP

## 2018-01-08 PROCEDURE — 99225 ZZC SUBSEQUENT OBSERVATION CARE,LEVEL II: CPT | Performed by: INTERNAL MEDICINE

## 2018-01-08 RX ORDER — PHENOBARBITAL 32.4 MG/1
32.4 TABLET ORAL 3 TIMES DAILY
Status: DISCONTINUED | OUTPATIENT
Start: 2018-01-10 | End: 2018-01-10

## 2018-01-08 RX ORDER — PHENOBARBITAL 32.4 MG/1
64.8 TABLET ORAL 2 TIMES DAILY
Status: COMPLETED | OUTPATIENT
Start: 2018-01-09 | End: 2018-01-09

## 2018-01-08 RX ORDER — PRAMIPEXOLE DIHYDROCHLORIDE 0.12 MG/1
0.12 TABLET ORAL 3 TIMES DAILY
Status: DISCONTINUED | OUTPATIENT
Start: 2018-01-08 | End: 2018-01-09

## 2018-01-08 RX ORDER — PHENOBARBITAL 32.4 MG/1
64.8 TABLET ORAL 3 TIMES DAILY
Status: COMPLETED | OUTPATIENT
Start: 2018-01-08 | End: 2018-01-08

## 2018-01-08 RX ORDER — PHENOBARBITAL 32.4 MG/1
32.4 TABLET ORAL
Status: COMPLETED | OUTPATIENT
Start: 2018-01-09 | End: 2018-01-09

## 2018-01-08 RX ADMIN — GABAPENTIN 600 MG: 600 TABLET, FILM COATED ORAL at 08:49

## 2018-01-08 RX ADMIN — FENOFIBRATE 54 MG: 54 TABLET ORAL at 08:49

## 2018-01-08 RX ADMIN — ACETAMINOPHEN 650 MG: 325 TABLET, FILM COATED ORAL at 01:15

## 2018-01-08 RX ADMIN — PROPRANOLOL HYDROCHLORIDE 20 MG: 20 TABLET ORAL at 08:48

## 2018-01-08 RX ADMIN — QUETIAPINE FUMARATE 100 MG: 100 TABLET ORAL at 21:04

## 2018-01-08 RX ADMIN — QUETIAPINE 50 MG: 25 TABLET ORAL at 09:57

## 2018-01-08 RX ADMIN — PRAMIPEXOLE DIHYDROCHLORIDE 0.12 MG: 0.12 TABLET ORAL at 21:03

## 2018-01-08 RX ADMIN — METFORMIN HYDROCHLORIDE 1000 MG: 500 TABLET, EXTENDED RELEASE ORAL at 08:48

## 2018-01-08 RX ADMIN — ALLOPURINOL 300 MG: 300 TABLET ORAL at 08:49

## 2018-01-08 RX ADMIN — ASPIRIN 81 MG: 81 TABLET, COATED ORAL at 08:49

## 2018-01-08 RX ADMIN — PRAMIPEXOLE DIHYDROCHLORIDE 0.12 MG: 0.12 TABLET ORAL at 16:18

## 2018-01-08 RX ADMIN — PROPRANOLOL HYDROCHLORIDE 20 MG: 20 TABLET ORAL at 16:19

## 2018-01-08 RX ADMIN — PHENOBARBITAL 64.8 MG: 32.4 TABLET ORAL at 16:19

## 2018-01-08 RX ADMIN — TAMSULOSIN HYDROCHLORIDE 0.4 MG: 0.4 CAPSULE ORAL at 08:49

## 2018-01-08 RX ADMIN — QUETIAPINE 25 MG: 25 TABLET ORAL at 01:16

## 2018-01-08 RX ADMIN — PHENOBARBITAL 64.8 MG: 32.4 TABLET ORAL at 08:49

## 2018-01-08 RX ADMIN — ATORVASTATIN CALCIUM 20 MG: 20 TABLET, FILM COATED ORAL at 08:48

## 2018-01-08 RX ADMIN — CLONIDINE HYDROCHLORIDE 0.1 MG: 0.1 TABLET ORAL at 21:04

## 2018-01-08 RX ADMIN — GABAPENTIN 600 MG: 600 TABLET, FILM COATED ORAL at 21:04

## 2018-01-08 RX ADMIN — QUETIAPINE 50 MG: 25 TABLET ORAL at 19:38

## 2018-01-08 RX ADMIN — GABAPENTIN 600 MG: 600 TABLET, FILM COATED ORAL at 16:19

## 2018-01-08 RX ADMIN — QUETIAPINE 25 MG: 25 TABLET ORAL at 14:28

## 2018-01-08 RX ADMIN — PROPRANOLOL HYDROCHLORIDE 20 MG: 20 TABLET ORAL at 21:05

## 2018-01-08 RX ADMIN — CLONIDINE HYDROCHLORIDE 0.1 MG: 0.1 TABLET ORAL at 08:48

## 2018-01-08 RX ADMIN — PHENOBARBITAL 64.8 MG: 32.4 TABLET ORAL at 21:03

## 2018-01-08 NOTE — CONSULTS
[facility] Psychiatric Consult Progress Note    Interval History:   Pt seen, chart reviewed, case discussed with nursing staff and treating clinicians.  Dhruv says that he could not get to sleep until 3 AM.  He continues to look very flat, depressed, complains that he is anxious, and that his legs are restless at night.  This is been a long-term problem.  We discussed a trial of Mirapex.  Prepetition screening should be seeing him today.  We discussed the possibility that psychiatry transfer may be appropriate at some point.  I did write to start tapering his phenobarbital after today.  Dhruv tells me that he has been on multiple antidepressants in the past without much benefit.  I think that deserves clarification and he may benefit from antidepressant medication in the near future.     Review of systems:   10 point Review of Systems completed by Dr. Luciano, and is  is negative other than noted in the HPI     Medications:       PHENobarbital  64.8 mg Oral TID     [START ON 1/9/2018] PHENobarbital  64.8 mg Oral BID     [START ON 1/9/2018] PHENobarbital  32.4 mg Oral Daily with supper     [START ON 1/10/2018] PHENobarbital  32.4 mg Oral TID     pramipexole  0.125 mg Oral TID     QUEtiapine  100 mg Oral At Bedtime     metFORMIN  1,000 mg Oral Daily     allopurinol  300 mg Oral Daily     aspirin EC EC tablet 81 mg  81 mg Oral Daily     atorvastatin (LIPITOR) tablet 20 mg  20 mg Oral Daily     cloNIDine (CATAPRES) tablet 0.1 mg  0.1 mg Oral BID     fenofibrate  54 mg Oral Daily     gabapentin (NEURONTIN) tablet 600 mg  600 mg Oral TID     polyethylene glycol  17 g Oral Daily     propranolol (INDERAL) tablet 20 mg  20 mg Oral TID     tamsulosin  0.4 mg Oral Daily     QUEtiapine, glucose **OR** dextrose **OR** glucagon, albuterol, fexofenadine (ALLEGRA) tablet 180 mg, acetaminophen, acetaminophen, naloxone, ondansetron **OR** ondansetron    Mental Status Examination:     Appearance:  appeared older than  stated age, poorly groomed and fatigued  Eye Contact:  intense, Stares blankly, repeatedly blinking  Speech:  dysarthria, decreased prosody and paucity of speech  Language:Normal  Psychomotor Behavior:  evidence of tics and Blinking repeatedly  Mood:  anxious and depressed  Affect:  intensity is blunted and intensity is flat  Thought Process:  logical, linear and goal oriented no loose associations, perseverates about anxiety, poor sleep, restless legs.   Thought Content:  no evidence of suicidal ideation or homicidal ideation, no evidence of psychotic thought and Impoverished  Oriented to:  time, person, and place  Attention Span and Concentration:  limited  Recent and Remote Memory:  limited  Fund of Knowledge: Poor  Muscle Strength and Tone: normal  Gait and Station: Normal  Insight:  limited  Judgment:  poor        Labs/Vitals:     Recent Results (from the past 24 hour(s))   Glucose by meter    Collection Time: 01/07/18  9:25 AM   Result Value Ref Range    Glucose 147 (H) 70 - 99 mg/dL   Glucose by meter    Collection Time: 01/07/18  2:19 PM   Result Value Ref Range    Glucose 60 (L) 70 - 99 mg/dL   Glucose by meter    Collection Time: 01/07/18  2:36 PM   Result Value Ref Range    Glucose 47 (LL) 70 - 99 mg/dL   Glucose by meter    Collection Time: 01/07/18  2:54 PM   Result Value Ref Range    Glucose 68 (L) 70 - 99 mg/dL   Glucose by meter    Collection Time: 01/07/18  3:17 PM   Result Value Ref Range    Glucose 80 70 - 99 mg/dL   Glucose by meter    Collection Time: 01/07/18  3:35 PM   Result Value Ref Range    Glucose 71 70 - 99 mg/dL   Glucose by meter    Collection Time: 01/07/18  3:56 PM   Result Value Ref Range    Glucose 91 70 - 99 mg/dL   Glucose by meter    Collection Time: 01/07/18  4:15 PM   Result Value Ref Range    Glucose 99 70 - 99 mg/dL   Glucose by meter    Collection Time: 01/07/18  4:33 PM   Result Value Ref Range    Glucose 90 70 - 99 mg/dL   Glucose by meter    Collection Time: 01/07/18   4:59 PM   Result Value Ref Range    Glucose 101 (H) 70 - 99 mg/dL   Glucose by meter    Collection Time: 01/07/18  8:55 PM   Result Value Ref Range    Glucose 187 (H) 70 - 99 mg/dL     B/P: 114/72, T: 98.1, P: 73, R: 14    Impression:   Dhruv continues to display very poor insight.  We will continue with a phenobarbital  and avoid narcotics.  He does not seem to be in any overt opiate withdrawal at this point, clonidine is being dosed at 0.1 mg twice daily.  At this point I do not think I am going to add any additional medicine for opiate detoxification.  Prepetition screening will likely see him soon, psychiatry transfer may be appropriate once we know if they are going to support commitment.  I will start tapering the phenobarbital tomorrow, and add a small dose of Mirapex for his restless legs.      DIagnoses:   1.  Delirium secondary to polypharmacy  2.  Opiate use disorder  3.  Benzodiazepine use disorder  4.  Anxiety disorder not otherwise specified  5.  Rule out mood disorder with psychotic features  6.  Rule out intellectual disability  7. RLS         Plan:   1. Written information given on medications. Side effects, risks, benefits reviewed.  2.  Continue phenobarbital taper, he is currently on 64.8 mg 3 times daily and I wrote taper starting tomorrow  3.  Continue clonidine 0.1 mg twice daily  4.  Increase Seroquel to 150 mg nightly, trial mirapex 0.125mg tid for RLS-watch for any signs of impulsivity or psychosis as this is a dopamine agonist  5.  Continue gabapentin, off all benzodiazepines and narcotics   6.  Continue 72 hour hold, commitment paperwork filed he may benefit from psych transfer, we will follow up tomorrow  7.  Hopefully We will have some response from pre-petitionto see where we are at  in terms of the commitment.      Attestation:  Patient has been seen and evaluated by me,  Fercho Luciano MD

## 2018-01-08 NOTE — PROGRESS NOTES
[facility]    Hospitalist Progress Note    Assessment & Plan   Dhruv Shaikh is a 47-year-old gentleman with chronic narcotic dependence presents with inability to take care of himself.    Chronic narcotic and benzodiazepine dependence:  -patient has a long-standing history of narcotic and benzodiazepine dependence  -reportedly also has history of poly substance dependence and major depression  -given multiple evaluations for the same problem and ongoing dependence, pt now committed. Attempting detoxification with phenobarbital for now.  -eventual transfer to psychiatry unit/inpt detox facility.  -Discontinue prior to admission narcotics and benzodiazepines  -psychiatry following    Diabetes mellitus type II:    -Continue PTA metformin.   -Hemoglobin A-1 C7 .6  -DC glipizide due to hypoglycemia(started 2 days ago)  -Check BGs; no SS for now.    Hypertension:    -Continue PTA clonidine and propranolol   -hold losartan for now     History of gout:    -Continue  allopurinol.     History of hyperlipidemia:    -Continue  atorvastatin and fenofibrate.       D/W: RN  DVT Prophylaxis: Pneumatic Compression Devices  Code Status: Full Code    Disposition: Expected discharge in 2 + days    Yang Johnson MD    Interval History   Hypoglycemia yesterday. Glipizide stopped.BGs better. Poor sleep. No evidence of overt withdrawal    -Data reviewed today: I reviewed all new labs and imaging results over the last 24 hours. I personally reviewed no images or EKG's today.    Physical Exam   Temp: 97.6  F (36.4  C) Temp src: Oral BP: 125/76 Pulse: 74 Heart Rate: 63 Resp: 16 SpO2: 97 % O2 Device: None (Room air)    Vitals:    01/05/18 1256   Weight: 110.7 kg (244 lb)     Vital Signs with Ranges  Temp:  [97.6  F (36.4  C)-98.3  F (36.8  C)] 97.6  F (36.4  C)  Pulse:  [74] 74  Heart Rate:  [63-83] 63  Resp:  [14-16] 16  BP: ()/(51-97) 125/76  SpO2:  [95 %-97 %] 97 %  I/O last 3 completed shifts:  In: 740  [P.O.:740]  Out: -     Constitutional: AAOX3, withdrawn, flat affect   Respiratory:  No crackles, No wheezes  Cardiovascular: RRR, No murmur  GI: Soft, Non- tender, BS- normoactive  Skin/Integument: Warm and dry, no rashes  MSK: No joint deformity or swelling, no edema  Neuro: CN- grossly intact; no tremors    Medications        PHENobarbital  64.8 mg Oral TID     [START ON 1/9/2018] PHENobarbital  64.8 mg Oral BID     [START ON 1/9/2018] PHENobarbital  32.4 mg Oral Daily with supper     [START ON 1/10/2018] PHENobarbital  32.4 mg Oral TID     pramipexole  0.125 mg Oral TID     QUEtiapine  100 mg Oral At Bedtime     metFORMIN  1,000 mg Oral Daily     allopurinol  300 mg Oral Daily     aspirin EC EC tablet 81 mg  81 mg Oral Daily     atorvastatin (LIPITOR) tablet 20 mg  20 mg Oral Daily     cloNIDine (CATAPRES) tablet 0.1 mg  0.1 mg Oral BID     fenofibrate  54 mg Oral Daily     gabapentin (NEURONTIN) tablet 600 mg  600 mg Oral TID     polyethylene glycol  17 g Oral Daily     propranolol (INDERAL) tablet 20 mg  20 mg Oral TID     tamsulosin  0.4 mg Oral Daily       Data     Recent Labs  Lab 01/06/18  1053 01/05/18  1407   WBC 8.5 10.3   HGB 14.5 15.6   MCV 92 90    287    135   POTASSIUM 3.5 3.4   CHLORIDE 103 99   CO2 29 27   BUN 11 12   CR 0.81 0.88   ANIONGAP 6 9   WILDER 8.9 9.3   * 224*   ALBUMIN  --  4.2   PROTTOTAL  --  7.6   BILITOTAL  --  0.8   ALKPHOS  --  140   ALT  --  39   AST  --  22   TROPI  --  <0.015       No results found for this or any previous visit (from the past 24 hour(s)).

## 2018-01-08 NOTE — PLAN OF CARE
Problem: Patient Care Overview  Goal: Plan of Care/Patient Progress Review  Outcome: No Change  Pt A/O x3. Up with SBA. Flat affect. Withdrawn.Tolerating reg diet. VSS, on RA. LS clear. Denies pain, but c/o restless leg bilaterally. x1 PRN Seroquel given, and 1x tylenol given for C/o restless leg. 72 hr hold continue. Glipizide d/c'd. D/C to psych facility pending.

## 2018-01-08 NOTE — PLAN OF CARE
Problem: Patient Care Overview  Goal: Plan of Care/Patient Progress Review  Outcome: No Change  A&Ox3, reoriented to situation.  Medicated x1 for anxiety.  VSS on r/a, denies pain.  , 182.  Oral diabetic meds discontinued during admission.  Psych making changes to med regimen.  Tapering phenobarbital.  Psych will follow up tomorrow regarding commitment, 72 hr hold now, expires 1/10.  Mirapex added for restless legs.  No significant changes this shift.  SBA to bathroom.    Patient's apartment keys given to mother, per patient request.

## 2018-01-08 NOTE — PROGRESS NOTES
MYLES SMITH:  Will be contacting [facility] Pre-Admission Screening on Tuesday morning and expect a screener to meet with patient Tuesday afternoon.  Met briefly with patient.  He thought he needed to meet with this writer before d/c.  He stated he needs to get out of the hospital so he can get to work and not be fired.  Explained to patient that he is on a 72 hour hold and will be here until at least Wednesday.  Patient did give writer permission to call his mother. Will call mother on Tuesday.

## 2018-01-09 LAB
GLUCOSE BLDC GLUCOMTR-MCNC: 141 MG/DL (ref 70–99)
GLUCOSE BLDC GLUCOMTR-MCNC: 141 MG/DL (ref 70–99)
GLUCOSE BLDC GLUCOMTR-MCNC: 145 MG/DL (ref 70–99)
GLUCOSE BLDC GLUCOMTR-MCNC: 153 MG/DL (ref 70–99)

## 2018-01-09 PROCEDURE — 25000132 ZZH RX MED GY IP 250 OP 250 PS 637: Performed by: INTERNAL MEDICINE

## 2018-01-09 PROCEDURE — 25000132 ZZH RX MED GY IP 250 OP 250 PS 637: Performed by: PSYCHIATRY & NEUROLOGY

## 2018-01-09 PROCEDURE — 99232 SBSQ HOSP IP/OBS MODERATE 35: CPT | Performed by: PSYCHIATRY & NEUROLOGY

## 2018-01-09 PROCEDURE — 99225 ZZC SUBSEQUENT OBSERVATION CARE,LEVEL II: CPT | Performed by: INTERNAL MEDICINE

## 2018-01-09 PROCEDURE — 99207 ZZC CDG-CODE CATEGORY CHANGED: CPT | Performed by: INTERNAL MEDICINE

## 2018-01-09 PROCEDURE — G0378 HOSPITAL OBSERVATION PER HR: HCPCS

## 2018-01-09 PROCEDURE — 00000146 ZZHCL STATISTIC GLUCOSE BY METER IP

## 2018-01-09 RX ORDER — MIRTAZAPINE 15 MG/1
30 TABLET, FILM COATED ORAL AT BEDTIME
Status: DISCONTINUED | OUTPATIENT
Start: 2018-01-09 | End: 2018-01-12

## 2018-01-09 RX ORDER — PRAMIPEXOLE DIHYDROCHLORIDE 1.5 MG/1
1.5 TABLET ORAL 3 TIMES DAILY
Status: DISCONTINUED | OUTPATIENT
Start: 2018-01-09 | End: 2018-01-12 | Stop reason: HOSPADM

## 2018-01-09 RX ADMIN — ACETAMINOPHEN 650 MG: 325 TABLET, FILM COATED ORAL at 01:51

## 2018-01-09 RX ADMIN — CLONIDINE HYDROCHLORIDE 0.1 MG: 0.1 TABLET ORAL at 09:07

## 2018-01-09 RX ADMIN — PRAMIPEXOLE DIHYDROCHLORIDE 1.5 MG: 1.5 TABLET ORAL at 17:22

## 2018-01-09 RX ADMIN — QUETIAPINE 50 MG: 25 TABLET ORAL at 00:14

## 2018-01-09 RX ADMIN — PRAMIPEXOLE DIHYDROCHLORIDE 1.5 MG: 1.5 TABLET ORAL at 21:01

## 2018-01-09 RX ADMIN — QUETIAPINE FUMARATE 150 MG: 100 TABLET ORAL at 21:02

## 2018-01-09 RX ADMIN — PROPRANOLOL HYDROCHLORIDE 20 MG: 20 TABLET ORAL at 17:21

## 2018-01-09 RX ADMIN — CLONIDINE HYDROCHLORIDE 0.1 MG: 0.1 TABLET ORAL at 21:02

## 2018-01-09 RX ADMIN — GABAPENTIN 600 MG: 600 TABLET, FILM COATED ORAL at 21:01

## 2018-01-09 RX ADMIN — VORTIOXETINE 5 MG: 5 TABLET, FILM COATED ORAL at 12:15

## 2018-01-09 RX ADMIN — FENOFIBRATE 54 MG: 54 TABLET ORAL at 09:07

## 2018-01-09 RX ADMIN — PHENOBARBITAL 64.8 MG: 32.4 TABLET ORAL at 09:06

## 2018-01-09 RX ADMIN — GABAPENTIN 600 MG: 600 TABLET, FILM COATED ORAL at 17:21

## 2018-01-09 RX ADMIN — GABAPENTIN 600 MG: 600 TABLET, FILM COATED ORAL at 09:06

## 2018-01-09 RX ADMIN — ALLOPURINOL 300 MG: 300 TABLET ORAL at 09:06

## 2018-01-09 RX ADMIN — ATORVASTATIN CALCIUM 20 MG: 20 TABLET, FILM COATED ORAL at 09:07

## 2018-01-09 RX ADMIN — ASPIRIN 81 MG: 81 TABLET, COATED ORAL at 09:07

## 2018-01-09 RX ADMIN — PHENOBARBITAL 32.4 MG: 32.4 TABLET ORAL at 17:21

## 2018-01-09 RX ADMIN — QUETIAPINE 25 MG: 25 TABLET ORAL at 09:06

## 2018-01-09 RX ADMIN — PROPRANOLOL HYDROCHLORIDE 20 MG: 20 TABLET ORAL at 21:02

## 2018-01-09 RX ADMIN — MIRTAZAPINE 30 MG: 15 TABLET, FILM COATED ORAL at 21:02

## 2018-01-09 RX ADMIN — METFORMIN HYDROCHLORIDE 1000 MG: 500 TABLET, EXTENDED RELEASE ORAL at 09:07

## 2018-01-09 RX ADMIN — PHENOBARBITAL 64.8 MG: 32.4 TABLET ORAL at 21:02

## 2018-01-09 RX ADMIN — TAMSULOSIN HYDROCHLORIDE 0.4 MG: 0.4 CAPSULE ORAL at 09:06

## 2018-01-09 RX ADMIN — PRAMIPEXOLE DIHYDROCHLORIDE 1.5 MG: 1.5 TABLET ORAL at 12:15

## 2018-01-09 RX ADMIN — PROPRANOLOL HYDROCHLORIDE 20 MG: 20 TABLET ORAL at 09:06

## 2018-01-09 NOTE — PLAN OF CARE
Problem: Patient Care Overview  Goal: Plan of Care/Patient Progress Review  Outcome: No Change  A&O x4 but lethargic.  SBA, regular diet.  VSS on room air.  .  Seroquel PRN given x1 for anxiety effective for relief.  Tylenol given x1 effective for discomfort of restless legs.  On 72 hour hold.  Nursing will continue to monitor.

## 2018-01-09 NOTE — PROGRESS NOTES
[facility]    Hospitalist Progress Note    Assessment & Plan   Dhruv Shaikh is a 47-year-old gentleman with chronic narcotic dependence presents with inability to take care of himself.    Chronic narcotic and benzodiazepine dependence:  -patient has a long-standing history of narcotic and benzodiazepine dependence  -reportedly also has history of poly substance dependence and major depression  -given multiple evaluations for the same problem and ongoing dependence, pt now committed.   -Discontinue prior to admission narcotics and benzodiazepines  -Continue phenobarbital taper  -eventual transfer to psychiatry unit/inpt detox facility.  -Awaiting decision on petition for commitment from the [facility]  -psychiatry following; will follow up tomorrow regarding commitment, 72 hr hold now, expires 1/10    Diabetes mellitus type II:    -Continue PTA metformin.   -Hemoglobin A-1 C7 .6  -Check BGs; no SS for now.    Hypertension:    -Continue PTA clonidine and propranolol   -hold losartan for now(due to soft/borderline blood pressure)    History of gout:    -Continue  allopurinol.     History of hyperlipidemia:    -Continue  atorvastatin and fenofibrate.       D/W: RN  DVT Prophylaxis: Pneumatic Compression Devices  Code Status: Full Code    Disposition: Expected discharge in 2 + days    Yang Johnson MD    Interval History   blood sugars stable. Patient frustrated about having to continue to stay in the hospital. Continues to be on 72 hour hold. No withdrawal symptoms    -Data reviewed today: I reviewed all new labs and imaging results over the last 24 hours. I personally reviewed no images or EKG's today.    Physical Exam   Temp: 97.8  F (36.6  C) Temp src: Oral BP: 109/66 Pulse: 82 Heart Rate: 63 Resp: 18 SpO2: 98 % O2 Device: None (Room air)    Vitals:    01/05/18 1256 01/09/18 0721   Weight: 110.7 kg (244 lb) 107 kg (235 lb 14.3 oz)     Vital Signs with Ranges  Temp:  [97.4  F (36.3  C)-98  F (36.7   C)] 97.8  F (36.6  C)  Pulse:  [80-82] 82  Heart Rate:  [63-84] 63  Resp:  [16-18] 18  BP: (109-130)/(66-80) 109/66  SpO2:  [96 %-98 %] 98 %  I/O last 3 completed shifts:  In: 840 [P.O.:840]  Out: -     Constitutional: AAOX3, withdrawn, flat affect   Respiratory:  No crackles, No wheezes  Cardiovascular: RRR, No murmur  GI: Soft, Non- tender, BS- normoactive  Skin/Integument: Warm and dry, no rashes  MSK: No joint deformity or swelling, no edema  Neuro: CN- grossly intact; no tremors    Medications        pramipexole  1.5 mg Oral TID     mirtazapine  30 mg Oral At Bedtime     vortioxetine  5 mg Oral Daily     QUEtiapine  150 mg Oral At Bedtime     PHENobarbital  64.8 mg Oral BID     PHENobarbital  32.4 mg Oral Daily with supper     [START ON 1/10/2018] PHENobarbital  32.4 mg Oral TID     metFORMIN  1,000 mg Oral Daily     allopurinol  300 mg Oral Daily     aspirin EC EC tablet 81 mg  81 mg Oral Daily     atorvastatin (LIPITOR) tablet 20 mg  20 mg Oral Daily     cloNIDine (CATAPRES) tablet 0.1 mg  0.1 mg Oral BID     fenofibrate  54 mg Oral Daily     gabapentin (NEURONTIN) tablet 600 mg  600 mg Oral TID     polyethylene glycol  17 g Oral Daily     propranolol (INDERAL) tablet 20 mg  20 mg Oral TID     tamsulosin  0.4 mg Oral Daily       Data     Recent Labs  Lab 01/06/18  1053 01/05/18  1407   WBC 8.5 10.3   HGB 14.5 15.6   MCV 92 90    287    135   POTASSIUM 3.5 3.4   CHLORIDE 103 99   CO2 29 27   BUN 11 12   CR 0.81 0.88   ANIONGAP 6 9   WILDER 8.9 9.3   * 224*   ALBUMIN  --  4.2   PROTTOTAL  --  7.6   BILITOTAL  --  0.8   ALKPHOS  --  140   ALT  --  39   AST  --  22   TROPI  --  <0.015       No results found for this or any previous visit (from the past 24 hour(s)).

## 2018-01-09 NOTE — PLAN OF CARE
Problem: Patient Care Overview  Goal: Plan of Care/Patient Progress Review  Outcome: No Change  A&Ox4, blunted affect.  Denies pain.  VSS on r/a.  Psych adjusted medications.  72 hr hold.  Awaiting [facility] review for commitment.  Patient reluctant to seek tx, due to employment concerns.  Awaiting input from [facility] to determine plan and disposition. , 141 today.

## 2018-01-09 NOTE — PROGRESS NOTES
SPIRITUAL HEALTH SERVICES Progress Note  [facility] 66    SH, referral visit. The patient talked about difficulty in understanding God at this time, questions of suffering. The patient voiced his struggle of not knowing the path ahead in life.       offered non judgmental reflective listening and offered the lyrics of a Restorationist song for reflection.  introduced the idea of breath modality in meditation with the 23rd Psalm.    Struggling with questions about God and difficulty to voice his karen    SH will follow as LOS persists.      Delvis Chi  Chaplain Resident

## 2018-01-09 NOTE — CONSULTS
[facility] Psychiatric Consult Progress Note    Interval History:   Pt seen, chart reviewed, case discussed with nursing staff and treating clinicians. He continues to endorse issues with sleeping, anxiety, and restless legs. He states that benzodiazepines are the only medications that help this. He has taken only 1-2 mg of Requip at a time in the past. Discussed increasing Mirapex to 1.5mg tid. He has taken Lunesta (metallic taste), Sonata, Trazodone (dizziness) for sleep. Discussed starting pt on Remeron.  Reviewed the side effects, benefits, and complications of medication. Pt gave verbal agreement to begin Remeron 30mg qhs. He was also amenable to starting Trintellix 5mg qam with intent to titrate to 10mg. Seroquel will be increased to 150mg qhs as was suggested by Dr. Luciano. He reiterated that his mother will be able to support him. He is again concerned that he will lose his job.     Review of systems:   10 point Review of Systems completed by Jr Rodriguez MD, and is  is negative other than noted in the HPI     Medications:       PHENobarbital  64.8 mg Oral BID     PHENobarbital  32.4 mg Oral Daily with supper     [START ON 1/10/2018] PHENobarbital  32.4 mg Oral TID     pramipexole  0.125 mg Oral TID     QUEtiapine  100 mg Oral At Bedtime     metFORMIN  1,000 mg Oral Daily     allopurinol  300 mg Oral Daily     aspirin EC EC tablet 81 mg  81 mg Oral Daily     atorvastatin (LIPITOR) tablet 20 mg  20 mg Oral Daily     cloNIDine (CATAPRES) tablet 0.1 mg  0.1 mg Oral BID     fenofibrate  54 mg Oral Daily     gabapentin (NEURONTIN) tablet 600 mg  600 mg Oral TID     polyethylene glycol  17 g Oral Daily     propranolol (INDERAL) tablet 20 mg  20 mg Oral TID     tamsulosin  0.4 mg Oral Daily     QUEtiapine, glucose **OR** dextrose **OR** glucagon, albuterol, fexofenadine (ALLEGRA) tablet 180 mg, acetaminophen, acetaminophen, naloxone, ondansetron **OR** ondansetron    Mental Status  Examination:     Appearance:  appeared older than stated age, poorly groomed and fatigued  Eye Contact:  intense, Stares blankly, repeatedly blinking  Speech:  decreased prosody and mumbling  Language: Normal  Psychomotor Behavior:  evidence of tics and Blinking repeatedly  Mood:  anxious and depressed  Affect:  intensity is blunted and intensity is flat  Thought Process:  logical, linear and goal oriented no loose associations, perseverates about anxiety, poor sleep, restless legs.   Thought Content:  no evidence of suicidal ideation or homicidal ideation, no evidence of psychotic thought and Impoverished  Oriented to:  time, person, and place  Attention Span and Concentration:  limited  Recent and Remote Memory:  limited  Fund of Knowledge: Poor  Muscle Strength and Tone: normal  Gait and Station: Normal  Insight:  limited  Judgment:  poor        Labs/Vitals:     Recent Results (from the past 24 hour(s))   Glucose by meter    Collection Time: 01/08/18  8:47 AM   Result Value Ref Range    Glucose 137 (H) 70 - 99 mg/dL   Glucose by meter    Collection Time: 01/08/18 12:57 PM   Result Value Ref Range    Glucose 182 (H) 70 - 99 mg/dL   Glucose by meter    Collection Time: 01/08/18  6:15 PM   Result Value Ref Range    Glucose 163 (H) 70 - 99 mg/dL   Glucose by meter    Collection Time: 01/08/18  9:44 PM   Result Value Ref Range    Glucose 164 (H) 70 - 99 mg/dL   Glucose by meter    Collection Time: 01/09/18  3:05 AM   Result Value Ref Range    Glucose 145 (H) 70 - 99 mg/dL   Glucose by meter    Collection Time: 01/09/18  8:19 AM   Result Value Ref Range    Glucose 141 (H) 70 - 99 mg/dL     B/P: 114/72, T: 98.1, P: 73, R: 14    Impression:   Dhruv continues to display very poor insight.  We will continue with a phenobarbital taper and avoid narcotics.  He does not seem to be in any overt opiate withdrawal at this point, clonidine is being dosed at 0.1 mg twice daily.  At this point I do not think I am going to add any  additional medicine for opiate detoxification.  Prepetition screening will likely see him soon, psychiatry transfer may be appropriate once we know if they are going to support commitment.      DIagnoses:   1.  Delirium secondary to polypharmacy, resolving  2.  Opiate use disorder  3.  Benzodiazepine use disorder  4.  Anxiety disorder not otherwise specified  5.  Rule out mood disorder with psychotic features  6.  Rule out intellectual disability  7. RLS         Plan:   1. Written information given on medications. Side effects, risks, benefits reviewed.  2.  Continue phenobarbital taper.  3.  Begin Remeron 30mg qhs.  4.  Increase Seroquel to 150 mg nightly.  5.  Increase Mirapex to 1.5mg tid.  6.  Begin Trintellix 5mg qam with intent to titrate to 10mg.  7.  Continue 72 hour hold   8.  Pre-petition screening to see pt today.      Attestation:  Patient has been seen and evaluated by me,  Jr Rodriguez MD

## 2018-01-09 NOTE — PLAN OF CARE
Problem: Patient Care Overview  Goal: Plan of Care/Patient Progress Review  Ox4, Lethargic. VSS on RA except slightly hypertensive. Pt states he has very bad anxiety and is experiencing opiate withdrawal symptoms. He requested increase in Seroquel PRN and was given 50mg x1 this shfit. Up to the bathroom SBA multiple times this shift. Tolerating regular diet, good appetite. BG was 163/164, no S/S insulin ordered. Pt on 72 hour hold to end 1/11/18 at 12 am. Continue to monitor pt.

## 2018-01-10 LAB
GLUCOSE BLDC GLUCOMTR-MCNC: 125 MG/DL (ref 70–99)
GLUCOSE BLDC GLUCOMTR-MCNC: 138 MG/DL (ref 70–99)
GLUCOSE BLDC GLUCOMTR-MCNC: 171 MG/DL (ref 70–99)
GLUCOSE BLDC GLUCOMTR-MCNC: 201 MG/DL (ref 70–99)

## 2018-01-10 PROCEDURE — G0378 HOSPITAL OBSERVATION PER HR: HCPCS

## 2018-01-10 PROCEDURE — 99207 ZZC CDG-MDM COMPONENT: MEETS MODERATE - UP CODED: CPT | Performed by: HOSPITALIST

## 2018-01-10 PROCEDURE — 25000132 ZZH RX MED GY IP 250 OP 250 PS 637: Performed by: HOSPITALIST

## 2018-01-10 PROCEDURE — 25000132 ZZH RX MED GY IP 250 OP 250 PS 637: Performed by: PSYCHIATRY & NEUROLOGY

## 2018-01-10 PROCEDURE — 99225 ZZC SUBSEQUENT OBSERVATION CARE,LEVEL II: CPT | Performed by: HOSPITALIST

## 2018-01-10 PROCEDURE — 25000132 ZZH RX MED GY IP 250 OP 250 PS 637: Performed by: INTERNAL MEDICINE

## 2018-01-10 PROCEDURE — 99232 SBSQ HOSP IP/OBS MODERATE 35: CPT | Performed by: PSYCHIATRY & NEUROLOGY

## 2018-01-10 PROCEDURE — 00000146 ZZHCL STATISTIC GLUCOSE BY METER IP

## 2018-01-10 RX ORDER — PHENOBARBITAL 32.4 MG/1
32.4 TABLET ORAL 3 TIMES DAILY
Status: COMPLETED | OUTPATIENT
Start: 2018-01-10 | End: 2018-01-10

## 2018-01-10 RX ORDER — PHENOBARBITAL 32.4 MG/1
32.4 TABLET ORAL 2 TIMES DAILY
Status: COMPLETED | OUTPATIENT
Start: 2018-01-11 | End: 2018-01-11

## 2018-01-10 RX ORDER — PHENOBARBITAL 32.4 MG/1
32.4 TABLET ORAL AT BEDTIME
Status: DISCONTINUED | OUTPATIENT
Start: 2018-01-12 | End: 2018-01-12 | Stop reason: HOSPADM

## 2018-01-10 RX ORDER — METFORMIN HCL 500 MG
500 TABLET, EXTENDED RELEASE 24 HR ORAL
Status: DISCONTINUED | OUTPATIENT
Start: 2018-01-10 | End: 2018-01-12 | Stop reason: HOSPADM

## 2018-01-10 RX ADMIN — TAMSULOSIN HYDROCHLORIDE 0.4 MG: 0.4 CAPSULE ORAL at 09:04

## 2018-01-10 RX ADMIN — ATORVASTATIN CALCIUM 20 MG: 20 TABLET, FILM COATED ORAL at 09:04

## 2018-01-10 RX ADMIN — PROPRANOLOL HYDROCHLORIDE 20 MG: 20 TABLET ORAL at 09:05

## 2018-01-10 RX ADMIN — CLONIDINE HYDROCHLORIDE 0.1 MG: 0.1 TABLET ORAL at 09:05

## 2018-01-10 RX ADMIN — ACETAMINOPHEN 650 MG: 325 TABLET, FILM COATED ORAL at 09:04

## 2018-01-10 RX ADMIN — PROPRANOLOL HYDROCHLORIDE 20 MG: 20 TABLET ORAL at 16:15

## 2018-01-10 RX ADMIN — ASPIRIN 81 MG: 81 TABLET, COATED ORAL at 09:05

## 2018-01-10 RX ADMIN — METFORMIN HYDROCHLORIDE 500 MG: 500 TABLET, EXTENDED RELEASE ORAL at 17:48

## 2018-01-10 RX ADMIN — ACETAMINOPHEN 650 MG: 325 TABLET, FILM COATED ORAL at 16:20

## 2018-01-10 RX ADMIN — PROPRANOLOL HYDROCHLORIDE 20 MG: 20 TABLET ORAL at 21:26

## 2018-01-10 RX ADMIN — GABAPENTIN 600 MG: 600 TABLET, FILM COATED ORAL at 21:26

## 2018-01-10 RX ADMIN — CLONIDINE HYDROCHLORIDE 0.1 MG: 0.1 TABLET ORAL at 21:26

## 2018-01-10 RX ADMIN — VORTIOXETINE 5 MG: 5 TABLET, FILM COATED ORAL at 09:05

## 2018-01-10 RX ADMIN — PHENOBARBITAL 32.4 MG: 32.4 TABLET ORAL at 21:26

## 2018-01-10 RX ADMIN — QUETIAPINE 25 MG: 25 TABLET ORAL at 09:14

## 2018-01-10 RX ADMIN — FENOFIBRATE 54 MG: 54 TABLET ORAL at 09:04

## 2018-01-10 RX ADMIN — PHENOBARBITAL 32.4 MG: 32.4 TABLET ORAL at 09:05

## 2018-01-10 RX ADMIN — PRAMIPEXOLE DIHYDROCHLORIDE 1.5 MG: 1.5 TABLET ORAL at 16:15

## 2018-01-10 RX ADMIN — QUETIAPINE FUMARATE 150 MG: 100 TABLET ORAL at 21:26

## 2018-01-10 RX ADMIN — GABAPENTIN 600 MG: 600 TABLET, FILM COATED ORAL at 09:05

## 2018-01-10 RX ADMIN — GABAPENTIN 600 MG: 600 TABLET, FILM COATED ORAL at 16:15

## 2018-01-10 RX ADMIN — PRAMIPEXOLE DIHYDROCHLORIDE 1.5 MG: 1.5 TABLET ORAL at 09:04

## 2018-01-10 RX ADMIN — METFORMIN HYDROCHLORIDE 1000 MG: 500 TABLET, EXTENDED RELEASE ORAL at 09:04

## 2018-01-10 RX ADMIN — PRAMIPEXOLE DIHYDROCHLORIDE 1.5 MG: 1.5 TABLET ORAL at 21:27

## 2018-01-10 RX ADMIN — ALLOPURINOL 300 MG: 300 TABLET ORAL at 09:05

## 2018-01-10 RX ADMIN — PHENOBARBITAL 32.4 MG: 32.4 TABLET ORAL at 16:15

## 2018-01-10 RX ADMIN — QUETIAPINE 50 MG: 25 TABLET ORAL at 21:42

## 2018-01-10 RX ADMIN — QUETIAPINE 50 MG: 25 TABLET ORAL at 16:20

## 2018-01-10 RX ADMIN — MIRTAZAPINE 30 MG: 15 TABLET, FILM COATED ORAL at 21:43

## 2018-01-10 NOTE — PROGRESS NOTES
[facility]    Hospitalist Progress Note    Assessment & Plan   Dhruv Shaikh is a 47-year-old gentleman with chronic narcotic dependence presents with inability to take care of himself.    Chronic narcotic and benzodiazepine dependence: Patient has a long-standing history of narcotic and benzodiazepine dependence. Reportedly also has history of poly substance dependence and major depression. Given multiple evaluations for the same problem and ongoing dependence, pt now committed.   -Discontinue prior to admission narcotics and benzodiazepines  -Continue phenobarbital taper, appreciate psych recommendations, discussed with Dr Rodriguez  -Eventual transfer to psychiatry unit/inpt detox facility.  -Patient is now on court hold. Per SW, The next step will be for the  office to schedule patient's Examination and Pre-liminary Hearing, expected sometime next week.    Diabetes mellitus type II:    -Continue PTA metformin, and add 500 mg with supper as well since A1C is elevated and BS in high 100s  -Hb A1C7 .6  -Check BGs; no SS for now.    Hypertension:    -Continue PTA clonidine and propranolol   -hold losartan for now(due to soft/borderline blood pressure)    History of gout:    -Continue  allopurinol.     History of hyperlipidemia:    -Continue atorvastatin and fenofibrate.     D/W: RN/SW  DVT Prophylaxis: Pneumatic Compression Devices  Code Status: Full Code    Disposition: Expected discharge: Unclear at this time given that he is now on court hold, 2+ days    Caryn Alves MD  Hospitalist    Interval History   Patient expresses frustration about having to continue to stay in the hospital.   BS in high 100s  Continues to be on hold.   No withdrawal symptoms    -Data reviewed today: I reviewed all new labs and imaging results over the last 24 hours. I personally reviewed no images or EKG's today.    Physical Exam   Temp: 98.2  F (36.8  C) Temp src: Oral BP: 124/75 Pulse: 70 Heart  Rate: 78 Resp: 18 SpO2: 92 % O2 Device: None (Room air)    Vitals:    01/05/18 1256 01/09/18 0721   Weight: 110.7 kg (244 lb) 107 kg (235 lb 14.3 oz)     Vital Signs with Ranges  Temp:  [98.2  F (36.8  C)-98.3  F (36.8  C)] 98.2  F (36.8  C)  Pulse:  [70-72] 70  Heart Rate:  [72-78] 78  Resp:  [16-18] 18  BP: (124-136)/(75-88) 124/75  SpO2:  [92 %-98 %] 92 %  I/O last 3 completed shifts:  In: 360 [P.O.:360]  Out: -     Constitutional: AAOX3, withdrawn, flat affect   Respiratory:  No crackles, No wheezes  Cardiovascular: RRR, No murmur  GI: Soft, Non- tender, BS- normoactive  Skin/Integument: Warm and dry, no rashes  MSK: No joint deformity or swelling, no edema  Neuro: CN- grossly intact; no tremors    Medications        PHENobarbital  32.4 mg Oral TID     [START ON 1/11/2018] PHENobarbital  32.4 mg Oral BID     [START ON 1/12/2018] PHENobarbital  32.4 mg Oral At Bedtime     pramipexole  1.5 mg Oral TID     mirtazapine  30 mg Oral At Bedtime     vortioxetine  5 mg Oral Daily     QUEtiapine  150 mg Oral At Bedtime     metFORMIN  1,000 mg Oral Daily     allopurinol  300 mg Oral Daily     aspirin EC EC tablet 81 mg  81 mg Oral Daily     atorvastatin (LIPITOR) tablet 20 mg  20 mg Oral Daily     cloNIDine (CATAPRES) tablet 0.1 mg  0.1 mg Oral BID     fenofibrate  54 mg Oral Daily     gabapentin (NEURONTIN) tablet 600 mg  600 mg Oral TID     polyethylene glycol  17 g Oral Daily     propranolol (INDERAL) tablet 20 mg  20 mg Oral TID     tamsulosin  0.4 mg Oral Daily       Data     Recent Labs  Lab 01/06/18  1053 01/05/18  1407   WBC 8.5 10.3   HGB 14.5 15.6   MCV 92 90    287    135   POTASSIUM 3.5 3.4   CHLORIDE 103 99   CO2 29 27   BUN 11 12   CR 0.81 0.88   ANIONGAP 6 9   WILDER 8.9 9.3   * 224*   ALBUMIN  --  4.2   PROTTOTAL  --  7.6   BILITOTAL  --  0.8   ALKPHOS  --  140   ALT  --  39   AST  --  22   TROPI  --  <0.015       No results found for this or any previous visit (from the past 24 hour(s)).

## 2018-01-10 NOTE — CONSULTS
"[facility] Psychiatric Consult Progress Note    Interval History:   Pt seen, chart reviewed, case discussed with nursing staff and treating clinicians. Pt does not offer any complaints at this time. He continues to display poor insight into his situation, states \"I'm ready to go home today.\" Pre-petition screener visited with pt yesterday and court hold will be initiated before his 72 hour hold expires tonight if petition is supported. He is still on phenobarb and will continue to taper to 32.4mg bid tomorrow and 32.4mg qhs on 1/12/18.     Review of systems:   10 point Review of Systems completed by Jr Rodriguez MD, and is  is negative other than noted in the HPI     Medications:       pramipexole  1.5 mg Oral TID     mirtazapine  30 mg Oral At Bedtime     vortioxetine  5 mg Oral Daily     QUEtiapine  150 mg Oral At Bedtime     PHENobarbital  32.4 mg Oral TID     metFORMIN  1,000 mg Oral Daily     allopurinol  300 mg Oral Daily     aspirin EC EC tablet 81 mg  81 mg Oral Daily     atorvastatin (LIPITOR) tablet 20 mg  20 mg Oral Daily     cloNIDine (CATAPRES) tablet 0.1 mg  0.1 mg Oral BID     fenofibrate  54 mg Oral Daily     gabapentin (NEURONTIN) tablet 600 mg  600 mg Oral TID     polyethylene glycol  17 g Oral Daily     propranolol (INDERAL) tablet 20 mg  20 mg Oral TID     tamsulosin  0.4 mg Oral Daily     miconazole, QUEtiapine, glucose **OR** dextrose **OR** glucagon, albuterol, fexofenadine (ALLEGRA) tablet 180 mg, acetaminophen, acetaminophen, naloxone, ondansetron **OR** ondansetron    Mental Status Examination:     Appearance:  appeared older than stated age, poorly groomed and fatigued  Eye Contact:  intense, Stares blankly, repeatedly blinking  Speech:  decreased prosody and mumbling  Language: Normal  Psychomotor Behavior:  evidence of tics and Blinking repeatedly  Mood:  anxious and depressed  Affect:  intensity is blunted and intensity is flat  Thought Process:  logical, " linear and goal oriented no loose associations, perseverates about anxiety, poor sleep, restless legs.   Thought Content:  no evidence of suicidal ideation or homicidal ideation, no evidence of psychotic thought and Impoverished  Oriented to:  time, person, and place  Attention Span and Concentration:  limited  Recent and Remote Memory:  limited  Fund of Knowledge: Poor  Muscle Strength and Tone: normal  Gait and Station: Normal  Insight:  limited  Judgment:  poor        Labs/Vitals:     Recent Results (from the past 24 hour(s))   Glucose by meter    Collection Time: 01/09/18  1:03 PM   Result Value Ref Range    Glucose 141 (H) 70 - 99 mg/dL   Glucose by meter    Collection Time: 01/09/18  9:13 PM   Result Value Ref Range    Glucose 153 (H) 70 - 99 mg/dL   Glucose by meter    Collection Time: 01/10/18  2:58 AM   Result Value Ref Range    Glucose 171 (H) 70 - 99 mg/dL   Glucose by meter    Collection Time: 01/10/18  8:45 AM   Result Value Ref Range    Glucose 125 (H) 70 - 99 mg/dL     B/P: 114/72, T: 98.1, P: 73, R: 14    Impression:   Dhruv continues to display very poor insight.  We will continue with a phenobarbital taper and avoid narcotics.  He does not seem to be in any overt opiate withdrawal at this point, clonidine is being dosed at 0.1 mg twice daily.  At this point I do not think I am going to add any additional medicine for opiate detoxification.  Prepetition screening will likely see him soon, psychiatry transfer may be appropriate once we know if they are going to support commitment.      DIagnoses:   1.  Delirium secondary to polypharmacy, resolving  2.  Opiate use disorder  3.  Benzodiazepine use disorder  4.  Anxiety disorder not otherwise specified  5.  Rule out mood disorder with psychotic features  6.  Rule out intellectual disability  7. RLS         Plan:   1. Written information given on medications. Side effects, risks, benefits reviewed.  2.  Continue phenobarbital taper, 32.4mg bid tomorrow  and 32.4mg qhs on 1/12/18.  3.  Continue 72 hour hold.      Attestation:  Patient has been seen and evaluated by me,  Jr Rodriguez MD

## 2018-01-10 NOTE — PROGRESS NOTES
MYLES     D:  [facility] Pre Petition Screener, Myla Olivia called stating she staffed the case and the PPS department is supporting the hospital petition.  She will submit her report to the 's office and the hospital should receive a call from the [facility] by 1630 with verbal report that patient is on a Court Hold.  The next step will be for the  office to schedule patient's Examination and Pre-liminary Hearing.   Write expects the examination and hearing will be sometime next week.  The hospital and patient will receive a schedule.

## 2018-01-10 NOTE — PLAN OF CARE
Problem: Patient Care Overview  Goal: Plan of Care/Patient Progress Review  Outcome: No Change  A&O x4 flat affect. VSS on room air. SBA, regular diet.   no c/o pain.  On 72 hour hold bed, door alarm in place.  Nursing will continue to monitor.

## 2018-01-10 NOTE — PROGRESS NOTES
SW  [facility] Pre Petition Screener, Myla Olivia was out to visit patient last evening.  The Petition and Exhibit A has been completed.  Ms Albaro Leungdong will notify writer by 1400 if the PPS staff support the Administrative Petition.  If the staff support the petition a report will be filed with [facility] Attorney's office and they will request a Court Hold be placed before patient's 72 hour hold runs out at 0001 on 1/11/18.

## 2018-01-10 NOTE — PLAN OF CARE
Problem: Patient Care Overview  Goal: Plan of Care/Patient Progress Review  Outcome: No Change  A&Ox4, denies pain, VSS on r/a.  Seroquel x1 for anxiety, Tylenol x1 for h/a.  Zofran this am for nausea, all resolved.  Good appetite.   this morning.   Ambulates to bathroom.  Calls appropriately.  72 hr hold expires 0001 on 1/11/2018.

## 2018-01-10 NOTE — PLAN OF CARE
Problem: Patient Care Overview  Goal: Plan of Care/Patient Progress Review  Outcome: Improving  Patient had VSS, denied any chest pain and SOB. Patient ambulated around unit in late evening. Independent. No insight into why he is in hospital. Met with prescreen person last evening. Commitment in process. Continue to monitor.

## 2018-01-10 NOTE — PROGRESS NOTES
SPIRITUAL HEALTH SERVICES Progress Note  [facility]    SH, follow up visit. The patient expressed that the process for discharge was not moving along as hoped. The patient shared about travels and activities from past times.      provided care in listening and prayed.     The patient has started cope a bit more, reflection on life has helped this process.      SH will follow as LOS persists.        Delvis Chi  Chaplain Resident

## 2018-01-11 LAB
GLUCOSE BLDC GLUCOMTR-MCNC: 122 MG/DL (ref 70–99)
GLUCOSE BLDC GLUCOMTR-MCNC: 124 MG/DL (ref 70–99)
GLUCOSE BLDC GLUCOMTR-MCNC: 137 MG/DL (ref 70–99)
GLUCOSE BLDC GLUCOMTR-MCNC: 153 MG/DL (ref 70–99)

## 2018-01-11 PROCEDURE — 25000132 ZZH RX MED GY IP 250 OP 250 PS 637: Performed by: INTERNAL MEDICINE

## 2018-01-11 PROCEDURE — 25000132 ZZH RX MED GY IP 250 OP 250 PS 637: Performed by: HOSPITALIST

## 2018-01-11 PROCEDURE — 25000132 ZZH RX MED GY IP 250 OP 250 PS 637: Performed by: PSYCHIATRY & NEUROLOGY

## 2018-01-11 PROCEDURE — G0378 HOSPITAL OBSERVATION PER HR: HCPCS

## 2018-01-11 PROCEDURE — 99233 SBSQ HOSP IP/OBS HIGH 50: CPT | Performed by: PSYCHIATRY & NEUROLOGY

## 2018-01-11 PROCEDURE — 99207 ZZC CDG-CODE CATEGORY CHANGED: CPT | Performed by: HOSPITALIST

## 2018-01-11 PROCEDURE — 00000146 ZZHCL STATISTIC GLUCOSE BY METER IP

## 2018-01-11 PROCEDURE — 99224 ZZC SUBSEQUENT OBSERVATION CARE,LEVEL I: CPT | Performed by: HOSPITALIST

## 2018-01-11 RX ORDER — QUETIAPINE FUMARATE 100 MG/1
300 TABLET, FILM COATED ORAL AT BEDTIME
Status: DISCONTINUED | OUTPATIENT
Start: 2018-01-11 | End: 2018-01-12 | Stop reason: HOSPADM

## 2018-01-11 RX ADMIN — PROPRANOLOL HYDROCHLORIDE 20 MG: 20 TABLET ORAL at 16:08

## 2018-01-11 RX ADMIN — POLYETHYLENE GLYCOL 3350 17 G: 17 POWDER, FOR SOLUTION ORAL at 08:03

## 2018-01-11 RX ADMIN — FENOFIBRATE 54 MG: 54 TABLET ORAL at 08:04

## 2018-01-11 RX ADMIN — PROPRANOLOL HYDROCHLORIDE 20 MG: 20 TABLET ORAL at 21:42

## 2018-01-11 RX ADMIN — ACETAMINOPHEN 650 MG: 325 TABLET, FILM COATED ORAL at 21:40

## 2018-01-11 RX ADMIN — MIRTAZAPINE 30 MG: 15 TABLET, FILM COATED ORAL at 21:40

## 2018-01-11 RX ADMIN — METFORMIN HYDROCHLORIDE 1000 MG: 500 TABLET, EXTENDED RELEASE ORAL at 08:04

## 2018-01-11 RX ADMIN — CLONIDINE HYDROCHLORIDE 0.1 MG: 0.1 TABLET ORAL at 08:04

## 2018-01-11 RX ADMIN — METFORMIN HYDROCHLORIDE 500 MG: 500 TABLET, EXTENDED RELEASE ORAL at 16:08

## 2018-01-11 RX ADMIN — PRAMIPEXOLE DIHYDROCHLORIDE 1.5 MG: 1.5 TABLET ORAL at 08:04

## 2018-01-11 RX ADMIN — QUETIAPINE 50 MG: 25 TABLET ORAL at 17:14

## 2018-01-11 RX ADMIN — PRAMIPEXOLE DIHYDROCHLORIDE 1.5 MG: 1.5 TABLET ORAL at 16:08

## 2018-01-11 RX ADMIN — TAMSULOSIN HYDROCHLORIDE 0.4 MG: 0.4 CAPSULE ORAL at 08:04

## 2018-01-11 RX ADMIN — GABAPENTIN 600 MG: 600 TABLET, FILM COATED ORAL at 21:40

## 2018-01-11 RX ADMIN — ALLOPURINOL 300 MG: 300 TABLET ORAL at 08:03

## 2018-01-11 RX ADMIN — QUETIAPINE FUMARATE 300 MG: 100 TABLET ORAL at 21:41

## 2018-01-11 RX ADMIN — PRAMIPEXOLE DIHYDROCHLORIDE 1.5 MG: 1.5 TABLET ORAL at 21:41

## 2018-01-11 RX ADMIN — ASPIRIN 81 MG: 81 TABLET, COATED ORAL at 08:05

## 2018-01-11 RX ADMIN — GABAPENTIN 600 MG: 600 TABLET, FILM COATED ORAL at 16:08

## 2018-01-11 RX ADMIN — ATORVASTATIN CALCIUM 20 MG: 20 TABLET, FILM COATED ORAL at 08:04

## 2018-01-11 RX ADMIN — PROPRANOLOL HYDROCHLORIDE 20 MG: 20 TABLET ORAL at 08:03

## 2018-01-11 RX ADMIN — PHENOBARBITAL 32.4 MG: 32.4 TABLET ORAL at 08:04

## 2018-01-11 RX ADMIN — QUETIAPINE 50 MG: 25 TABLET ORAL at 05:30

## 2018-01-11 RX ADMIN — GABAPENTIN 600 MG: 600 TABLET, FILM COATED ORAL at 08:05

## 2018-01-11 RX ADMIN — PHENOBARBITAL 32.4 MG: 32.4 TABLET ORAL at 21:41

## 2018-01-11 RX ADMIN — VORTIOXETINE 5 MG: 5 TABLET, FILM COATED ORAL at 08:04

## 2018-01-11 RX ADMIN — CLONIDINE HYDROCHLORIDE 0.1 MG: 0.1 TABLET ORAL at 21:41

## 2018-01-11 NOTE — PLAN OF CARE
Problem: Patient Care Overview  Goal: Plan of Care/Patient Progress Review  Outcome: No Change  A&O x 4, frustrated,up independent in the room, vss, no c/o of pain, Psych following, court hearing scheduled for Tuesday 16 th at 2;15 pm, BGMs  124 and 137, Good appetite,  D/c pending progress.

## 2018-01-11 NOTE — PLAN OF CARE
Problem: Patient Care Overview  Goal: Plan of Care/Patient Progress Review  Outcome: No Change  Neuro/CMS: A&O x4, agitated when told that he is on a court hold, requested PRN seroquel at HS  Resp/O2: WNL  GI/Diet: Regular diet- good appetite  : Voiding adequately  Skin/Incisions: WNL  Activity: Indep in room, door alarm on  Lines: No access  Labs/tests:  VS/Pain: Denies pain,   D/C Plan: TBD pending court outcome

## 2018-01-11 NOTE — CONSULTS
"[facility] Psychiatric Consult Progress Note    Interval History:   Pt seen, chart reviewed, case discussed with nursing staff and treating clinicians.  Dhruv continues to fixated on the fact that he should be allowed to go home.  He is now on a court hold, they are supporting commitment.  He is very angry about this, was very irritable and dismissive with me, stating \"you are just trying to keep me here to exert power over me\".  I explained to him that there are significant concerns about his well-being, misuse of medications.  He complains that he cannot sleep because his roommate's snoring.  Dr. Rodriguez started him on some trintellex, he is also on Remeron and Seroquel along with some Mirapex for his complaints of restless legs.  He seems to have very poor insight, completely dismisses any notion that he needs to be in the hospital or needs help.     Review of systems:   10 point Review of Systems completed by Dr. Luciano, and is  is negative other than noted in the HPI     Medications:       PHENobarbital  32.4 mg Oral BID     [START ON 1/12/2018] PHENobarbital  32.4 mg Oral At Bedtime     metFORMIN  500 mg Oral Daily with supper     pramipexole  1.5 mg Oral TID     mirtazapine  30 mg Oral At Bedtime     vortioxetine  5 mg Oral Daily     QUEtiapine  150 mg Oral At Bedtime     metFORMIN  1,000 mg Oral Daily     allopurinol  300 mg Oral Daily     aspirin EC EC tablet 81 mg  81 mg Oral Daily     atorvastatin (LIPITOR) tablet 20 mg  20 mg Oral Daily     cloNIDine (CATAPRES) tablet 0.1 mg  0.1 mg Oral BID     fenofibrate  54 mg Oral Daily     gabapentin (NEURONTIN) tablet 600 mg  600 mg Oral TID     polyethylene glycol  17 g Oral Daily     propranolol (INDERAL) tablet 20 mg  20 mg Oral TID     tamsulosin  0.4 mg Oral Daily     miconazole, QUEtiapine, glucose **OR** dextrose **OR** glucagon, albuterol, fexofenadine (ALLEGRA) tablet 180 mg, acetaminophen, acetaminophen, naloxone, ondansetron " **OR** ondansetron    Mental Status Examination:     Appearance:  appeared older than stated age, poorly groomed and fatigued  Eye Contact:  intense, Stares blankly, repeatedly blinking  Speech:  dysarthria, decreased prosody and paucity of speech, very irritable tone  Language:Normal  Psychomotor Behavior:  evidence of tics and Blinking repeatedly  Mood:  anxious and depressed, also expresses being very angry about being here  Affect:  intensity is blunted and intensity is flat, very irritable  Thought Process:  logical, linear and goal oriented no loose associations, perseverates about anxiety, poor sleep, restless legs.   Thought Content:  no evidence of suicidal ideation or homicidal ideation and He is very argumentative, has a mild paranoid flavor and is accusatory  Oriented to:  time, person, and place  Attention Span and Concentration:  limited  Recent and Remote Memory:  limited  Fund of Knowledge: Poor  Muscle Strength and Tone: normal  Gait and Station: Normal  Insight:  limited  Judgment:  poor        Labs/Vitals:     Recent Results (from the past 24 hour(s))   Glucose by meter    Collection Time: 01/10/18  8:45 AM   Result Value Ref Range    Glucose 125 (H) 70 - 99 mg/dL   Glucose by meter    Collection Time: 01/10/18  5:44 PM   Result Value Ref Range    Glucose 138 (H) 70 - 99 mg/dL   Glucose by meter    Collection Time: 01/10/18  9:27 PM   Result Value Ref Range    Glucose 201 (H) 70 - 99 mg/dL     B/P: 114/72, T: 98.1, P: 73, R: 14    Impression:   Dhruv continues to display very poor insight.  This morning he is extremely irritable with me, almost paranoid.  We will make some adjustments in his Seroquel to help sleep.  I think we need to keep an eye on his level of irritability particularly in light of the fact that he is also on Mirapex, which is a dopamine agonist.  He looks depressed but also seems to have a paranoid flavor.  He is being tapered gradually off of benzodiazepines using  phenobarbital.    DIagnoses:   1.  Delirium secondary to polypharmacy  2.  Opiate use disorder  3.  Benzodiazepine use disorder  4.  Anxiety disorder not otherwise specified  5.  Rule out mood disorder with psychotic features  6.  Rule out intellectual disability  7. RLS         Plan:   1. Written information given on medications. Side effects, risks, benefits reviewed.  2.  Continue phenobarbital taper, he is currently on 64.8 mg 2 times daily and taper as written   3.  Continue clonidine 0.1 mg twice daily  4.  Increase Seroquel to 300 mg nightly, trial mirapex 0.25mg tid for RLS-watch for any signs of impulsivity or psychosis as this is a dopamine agonist  5.  Continue gabapentin, off all benzodiazepines and narcotics   6.  Continue 72 hour hold, commitment paperwork filed he may benefit from psych transfer, we will follow up tomorrow, I'd like Dr. Mckenna to lend an opinion as to whether psych transfer might benefit him as he appears to have underlying MH issues.  7.  Patient is now on a court hold, it looks to me like he might need residential chemical dependency treatment, preferably a dual diagnosis program  8.  Will increase trintellex to 10 mg daily    Attestation:  Patient has been seen and evaluated by me,  Fercho Luciano MD

## 2018-01-11 NOTE — PLAN OF CARE
Problem: Patient Care Overview  Goal: Plan of Care/Patient Progress Review  Outcome: No Change  Alert, oriented, up independently. VSS on room air. No pain. Flat affect but expresses frustration over status of court ordered hold, how long the process will take, and how long he has already been here in the hospital. Door alarm on. PRN seroquel given once.

## 2018-01-11 NOTE — PLAN OF CARE
Problem: Patient Care Overview  Goal: Plan of Care/Patient Progress Review  Outcome: No Change  Patient is A&Ox4; c/o anxiety (Seroquel helped) otherwise has been cooperative.  Patient is now on Court Hold as of 1536 today.  Door alarm on.  Tylenol given for back pain with relief.   - Metformin now BID.  VSS.  Up  Independently in room.

## 2018-01-11 NOTE — PROGRESS NOTES
"[facility]    Hospitalist Progress Note    Assessment & Plan   Dhruv Shaikh is a 47-year-old gentleman with chronic narcotic dependence presented with inability to take care of himself.    Chronic narcotic and benzodiazepine dependence  Delirium due to polypharmacy  Anxiety  Patient has a long-standing history of narcotic and benzodiazepine dependence. Reportedly also has history of poly substance dependence and major depression. Given multiple evaluations for the same problem and ongoing dependence, pt now committed.   -Discontinue prior to admission narcotics and benzodiazepines, and now on phenobarbital taper per psychiatrist, appreciated.  -Eventual transfer to psychiatry unit/inpt detox facility.  -Patient is now on court hold. Per SW, The next step will be for the  office to schedule patient's Examination and Pre-liminary Hearing, expected sometime next week.  -On Seroquel and Trintellex per psych.    Diabetes mellitus type II:    -Continue PTA metformin, and added 500 mg with supper as well since A1C is elevated and BS in 100s mostly  -Hb A1C7 .6  -Check BGs; no SS for now.    Hypertension:    -Continue PTA clonidine and propranolol   -Losartan on hold due to soft/borderline BP, BP controlled.    History of gout:    -Continue  allopurinol.     History of hyperlipidemia:    -Continue atorvastatin and fenofibrate.     RLS:   On Mirapex     D/W: RN/MYLES  DVT Prophylaxis: Pneumatic Compression Devices  Code Status: Full Code    Disposition: Expected discharge: Unclear at this time given that he is now on court hold, 2+ days, discussed with patient, reviewed with RN and BIMAL Alves MD  Hospitalist    Interval History    Patient expressing frustration and anger as he has been kept on hold. States psychiatrist showing \"overpower\"  Remains on hold.   No withdrawal symptoms    -Data reviewed today: I reviewed all new labs results over the last 24 hours. I personally reviewed " no images or EKG's today.    Physical Exam   Temp: 97.5  F (36.4  C) Temp src: Oral BP: 131/86   Heart Rate: 73 Resp: 18 SpO2: 93 % O2 Device: None (Room air)    Vitals:    01/05/18 1256 01/09/18 0721   Weight: 110.7 kg (244 lb) 107 kg (235 lb 14.3 oz)     Vital Signs with Ranges  Temp:  [97.5  F (36.4  C)-98.3  F (36.8  C)] 97.5  F (36.4  C)  Heart Rate:  [70-78] 73  Resp:  [18-20] 18  BP: (100-131)/(62-86) 131/86  SpO2:  [92 %-94 %] 93 %  I/O last 3 completed shifts:  In: 660 [P.O.:660]  Out: -     Constitutional: AAOX3, withdrawn, flat affect   Respiratory:  CTA bilaterally, normal work of breathing  Cardiovascular: RRR, No murmur  GI: Soft, Non- tender, BS- normoactive  Skin/Integument: Warm and dry, no rashes  MSK: No joint deformity or swelling, no edema  Neuro: CN- grossly intact; no tremors    Medications        QUEtiapine  300 mg Oral At Bedtime     PHENobarbital  32.4 mg Oral BID     [START ON 1/12/2018] PHENobarbital  32.4 mg Oral At Bedtime     metFORMIN  500 mg Oral Daily with supper     pramipexole  1.5 mg Oral TID     mirtazapine  30 mg Oral At Bedtime     vortioxetine  5 mg Oral Daily     metFORMIN  1,000 mg Oral Daily     allopurinol  300 mg Oral Daily     aspirin EC EC tablet 81 mg  81 mg Oral Daily     atorvastatin (LIPITOR) tablet 20 mg  20 mg Oral Daily     cloNIDine (CATAPRES) tablet 0.1 mg  0.1 mg Oral BID     fenofibrate  54 mg Oral Daily     gabapentin (NEURONTIN) tablet 600 mg  600 mg Oral TID     polyethylene glycol  17 g Oral Daily     propranolol (INDERAL) tablet 20 mg  20 mg Oral TID     tamsulosin  0.4 mg Oral Daily       Data     Recent Labs  Lab 01/06/18  1053 01/05/18  1407   WBC 8.5 10.3   HGB 14.5 15.6   MCV 92 90    287    135   POTASSIUM 3.5 3.4   CHLORIDE 103 99   CO2 29 27   BUN 11 12   CR 0.81 0.88   ANIONGAP 6 9   WILDER 8.9 9.3   * 224*   ALBUMIN  --  4.2   PROTTOTAL  --  7.6   BILITOTAL  --  0.8   ALKPHOS  --  140   ALT  --  39   AST  --  22   TROPI  --   <0.015       No results found for this or any previous visit (from the past 24 hour(s)).

## 2018-01-11 NOTE — PROGRESS NOTES
SW  D:  Discharge plan reviewed with Dr Luciano.  Patient will be off his taper tomorrow.  Discussed will patient benefit from further psychiatry care vs transfer to detox to await his hearing.  Dr Luciano is asking for Dr Rodriguez to evaluate patient tomorrow.   P:  Will follow for Dr Rodriguez's opinion.

## 2018-01-12 ENCOUNTER — TELEPHONE (OUTPATIENT)
Dept: BEHAVIORAL HEALTH | Facility: CLINIC | Age: 48
End: 2018-01-12

## 2018-01-12 ENCOUNTER — HOSPITAL ENCOUNTER (INPATIENT)
Facility: CLINIC | Age: 48
LOS: 7 days | Discharge: HOME | End: 2018-01-19
Attending: PSYCHIATRY & NEUROLOGY | Admitting: PSYCHIATRY & NEUROLOGY
Payer: MEDICAID

## 2018-01-12 VITALS
OXYGEN SATURATION: 96 % | RESPIRATION RATE: 18 BRPM | HEART RATE: 86 BPM | BODY MASS INDEX: 35.75 KG/M2 | DIASTOLIC BLOOD PRESSURE: 67 MMHG | SYSTOLIC BLOOD PRESSURE: 108 MMHG | WEIGHT: 235.89 LBS | TEMPERATURE: 97.2 F | HEIGHT: 68 IN

## 2018-01-12 DIAGNOSIS — E11.9 TYPE 2 DIABETES MELLITUS WITHOUT COMPLICATION, WITHOUT LONG-TERM CURRENT USE OF INSULIN (H): ICD-10-CM

## 2018-01-12 DIAGNOSIS — F41.8 DEPRESSION WITH ANXIETY: ICD-10-CM

## 2018-01-12 DIAGNOSIS — F33.2 SEVERE RECURRENT MAJOR DEPRESSION WITHOUT PSYCHOTIC FEATURES (H): Primary | ICD-10-CM

## 2018-01-12 PROBLEM — F41.9 ANXIETY: Status: ACTIVE | Noted: 2018-01-12

## 2018-01-12 LAB
GLUCOSE BLDC GLUCOMTR-MCNC: 115 MG/DL (ref 70–99)
GLUCOSE BLDC GLUCOMTR-MCNC: 124 MG/DL (ref 70–99)
GLUCOSE BLDC GLUCOMTR-MCNC: 149 MG/DL (ref 70–99)
T4 FREE SERPL-MCNC: 0.84 NG/DL (ref 0.76–1.46)
TSH SERPL DL<=0.005 MIU/L-ACNC: 0.04 MU/L (ref 0.4–4)

## 2018-01-12 PROCEDURE — 36415 COLL VENOUS BLD VENIPUNCTURE: CPT | Performed by: PSYCHIATRY & NEUROLOGY

## 2018-01-12 PROCEDURE — G0378 HOSPITAL OBSERVATION PER HR: HCPCS

## 2018-01-12 PROCEDURE — 25000132 ZZH RX MED GY IP 250 OP 250 PS 637: Performed by: PSYCHIATRY & NEUROLOGY

## 2018-01-12 PROCEDURE — 25000132 ZZH RX MED GY IP 250 OP 250 PS 637: Performed by: INTERNAL MEDICINE

## 2018-01-12 PROCEDURE — 84443 ASSAY THYROID STIM HORMONE: CPT | Performed by: PSYCHIATRY & NEUROLOGY

## 2018-01-12 PROCEDURE — 00000146 ZZHCL STATISTIC GLUCOSE BY METER IP

## 2018-01-12 PROCEDURE — 99207 ZZC CDG-CODE CATEGORY CHANGED: CPT | Performed by: HOSPITALIST

## 2018-01-12 PROCEDURE — 12400006 ZZH R&B MH INTERMEDIATE

## 2018-01-12 PROCEDURE — 84439 ASSAY OF FREE THYROXINE: CPT | Performed by: PSYCHIATRY & NEUROLOGY

## 2018-01-12 PROCEDURE — 99217 ZZC OBSERVATION CARE DISCHARGE: CPT | Performed by: HOSPITALIST

## 2018-01-12 RX ORDER — MULTIPLE VITAMINS W/ MINERALS TAB 9MG-400MCG
1 TAB ORAL DAILY
Qty: 100 TABLET | Refills: 3 | DISCHARGE
Start: 2018-01-12

## 2018-01-12 RX ORDER — MIRTAZAPINE 45 MG/1
45 TABLET, FILM COATED ORAL AT BEDTIME
Qty: 30 TABLET | Status: ON HOLD | DISCHARGE
Start: 2018-01-12 | End: 2018-01-19

## 2018-01-12 RX ORDER — METFORMIN HCL 500 MG
500 TABLET, EXTENDED RELEASE 24 HR ORAL
Qty: 30 TABLET | Status: ON HOLD | DISCHARGE
Start: 2018-01-12 | End: 2018-01-19

## 2018-01-12 RX ORDER — PROPRANOLOL HYDROCHLORIDE 10 MG/1
20 TABLET ORAL 3 TIMES DAILY
Status: DISCONTINUED | OUTPATIENT
Start: 2018-01-12 | End: 2018-01-19 | Stop reason: HOSPADM

## 2018-01-12 RX ORDER — PHENOBARBITAL 32.4 MG/1
32.4 TABLET ORAL AT BEDTIME
Status: COMPLETED | OUTPATIENT
Start: 2018-01-12 | End: 2018-01-12

## 2018-01-12 RX ORDER — ATORVASTATIN CALCIUM 10 MG/1
20 TABLET, FILM COATED ORAL DAILY
Status: DISCONTINUED | OUTPATIENT
Start: 2018-01-13 | End: 2018-01-19 | Stop reason: HOSPADM

## 2018-01-12 RX ORDER — FENOFIBRATE 54 MG/1
54 TABLET ORAL DAILY
Status: DISCONTINUED | OUTPATIENT
Start: 2018-01-13 | End: 2018-01-19 | Stop reason: HOSPADM

## 2018-01-12 RX ORDER — PRAMIPEXOLE DIHYDROCHLORIDE 1.5 MG/1
1.5 TABLET ORAL 3 TIMES DAILY
Status: DISCONTINUED | OUTPATIENT
Start: 2018-01-12 | End: 2018-01-19 | Stop reason: HOSPADM

## 2018-01-12 RX ORDER — MIRTAZAPINE 45 MG/1
45 TABLET, FILM COATED ORAL AT BEDTIME
Status: DISCONTINUED | OUTPATIENT
Start: 2018-01-12 | End: 2018-01-19 | Stop reason: HOSPADM

## 2018-01-12 RX ORDER — CLONIDINE HYDROCHLORIDE 0.1 MG/1
0.1 TABLET ORAL 2 TIMES DAILY
Status: DISCONTINUED | OUTPATIENT
Start: 2018-01-12 | End: 2018-01-19 | Stop reason: HOSPADM

## 2018-01-12 RX ORDER — PHENOBARBITAL 32.4 MG/1
32.4 TABLET ORAL AT BEDTIME
Qty: 60 TABLET | Status: ON HOLD | DISCHARGE
Start: 2018-01-12 | End: 2018-01-19

## 2018-01-12 RX ORDER — TAMSULOSIN HYDROCHLORIDE 0.4 MG/1
0.4 CAPSULE ORAL DAILY
Status: DISCONTINUED | OUTPATIENT
Start: 2018-01-13 | End: 2018-01-19 | Stop reason: HOSPADM

## 2018-01-12 RX ORDER — METFORMIN HCL 500 MG
1000 TABLET, EXTENDED RELEASE 24 HR ORAL
Qty: 30 TABLET | Status: ON HOLD | DISCHARGE
Start: 2018-01-12 | End: 2018-01-19

## 2018-01-12 RX ORDER — FEXOFENADINE HCL 60 MG/1
180 TABLET, FILM COATED ORAL DAILY PRN
Status: DISCONTINUED | OUTPATIENT
Start: 2018-01-12 | End: 2018-01-19 | Stop reason: HOSPADM

## 2018-01-12 RX ORDER — ASPIRIN 81 MG/1
81 TABLET ORAL DAILY
Status: DISCONTINUED | OUTPATIENT
Start: 2018-01-13 | End: 2018-01-19 | Stop reason: HOSPADM

## 2018-01-12 RX ORDER — POLYETHYLENE GLYCOL 3350 17 G/17G
17 POWDER, FOR SOLUTION ORAL DAILY
Status: DISCONTINUED | OUTPATIENT
Start: 2018-01-13 | End: 2018-01-19 | Stop reason: HOSPADM

## 2018-01-12 RX ORDER — GABAPENTIN 600 MG/1
600 TABLET ORAL 3 TIMES DAILY
Status: DISCONTINUED | OUTPATIENT
Start: 2018-01-12 | End: 2018-01-19 | Stop reason: HOSPADM

## 2018-01-12 RX ORDER — METFORMIN HCL 500 MG
1000 TABLET, EXTENDED RELEASE 24 HR ORAL
Status: DISCONTINUED | OUTPATIENT
Start: 2018-01-13 | End: 2018-01-13

## 2018-01-12 RX ORDER — MULTIVITAMIN WITH IRON
1 TABLET ORAL DAILY
Qty: 30 TABLET | DISCHARGE
Start: 2018-01-12

## 2018-01-12 RX ORDER — PRAMIPEXOLE DIHYDROCHLORIDE 1.5 MG/1
1.5 TABLET ORAL 3 TIMES DAILY
DISCHARGE
Start: 2018-01-12

## 2018-01-12 RX ORDER — ALLOPURINOL 300 MG/1
300 TABLET ORAL DAILY
Status: DISCONTINUED | OUTPATIENT
Start: 2018-01-13 | End: 2018-01-19 | Stop reason: HOSPADM

## 2018-01-12 RX ORDER — QUETIAPINE FUMARATE 25 MG/1
25-50 TABLET, FILM COATED ORAL EVERY 4 HOURS PRN
Status: DISCONTINUED | OUTPATIENT
Start: 2018-01-12 | End: 2018-01-13

## 2018-01-12 RX ORDER — QUETIAPINE FUMARATE 25 MG/1
25-50 TABLET, FILM COATED ORAL EVERY 4 HOURS PRN
Qty: 60 TABLET | Status: ON HOLD | DISCHARGE
Start: 2018-01-12 | End: 2018-01-19

## 2018-01-12 RX ORDER — ALBUTEROL SULFATE 90 UG/1
1-2 AEROSOL, METERED RESPIRATORY (INHALATION) EVERY 4 HOURS PRN
Status: DISCONTINUED | OUTPATIENT
Start: 2018-01-12 | End: 2018-01-19 | Stop reason: HOSPADM

## 2018-01-12 RX ORDER — MULTIPLE VITAMINS W/ MINERALS TAB 9MG-400MCG
1 TAB ORAL DAILY
Status: DISCONTINUED | OUTPATIENT
Start: 2018-01-13 | End: 2018-01-19 | Stop reason: HOSPADM

## 2018-01-12 RX ORDER — QUETIAPINE FUMARATE 300 MG/1
300 TABLET, FILM COATED ORAL AT BEDTIME
Qty: 60 TABLET | Status: ON HOLD | DISCHARGE
Start: 2018-01-12 | End: 2018-01-19

## 2018-01-12 RX ORDER — MIRTAZAPINE 15 MG/1
45 TABLET, FILM COATED ORAL AT BEDTIME
Status: DISCONTINUED | OUTPATIENT
Start: 2018-01-12 | End: 2018-01-12 | Stop reason: HOSPADM

## 2018-01-12 RX ORDER — METFORMIN HCL 500 MG
500 TABLET, EXTENDED RELEASE 24 HR ORAL
Status: DISCONTINUED | OUTPATIENT
Start: 2018-01-12 | End: 2018-01-13

## 2018-01-12 RX ADMIN — PRAMIPEXOLE DIHYDROCHLORIDE 1.5 MG: 1.5 TABLET ORAL at 21:23

## 2018-01-12 RX ADMIN — TAMSULOSIN HYDROCHLORIDE 0.4 MG: 0.4 CAPSULE ORAL at 09:16

## 2018-01-12 RX ADMIN — PRAMIPEXOLE DIHYDROCHLORIDE 1.5 MG: 1.5 TABLET ORAL at 09:16

## 2018-01-12 RX ADMIN — METFORMIN HYDROCHLORIDE 500 MG: 500 TABLET, EXTENDED RELEASE ORAL at 18:01

## 2018-01-12 RX ADMIN — PHENOBARBITAL 32.4 MG: 32.4 TABLET ORAL at 21:22

## 2018-01-12 RX ADMIN — MIRTAZAPINE 45 MG: 45 TABLET, FILM COATED ORAL at 21:21

## 2018-01-12 RX ADMIN — PROPRANOLOL HYDROCHLORIDE 20 MG: 10 TABLET ORAL at 21:22

## 2018-01-12 RX ADMIN — ALLOPURINOL 300 MG: 300 TABLET ORAL at 09:15

## 2018-01-12 RX ADMIN — CLONIDINE HYDROCHLORIDE 0.1 MG: 0.1 TABLET ORAL at 09:16

## 2018-01-12 RX ADMIN — ACETAMINOPHEN 650 MG: 325 TABLET, FILM COATED ORAL at 15:07

## 2018-01-12 RX ADMIN — QUETIAPINE FUMARATE 300 MG: 200 TABLET, FILM COATED ORAL at 21:21

## 2018-01-12 RX ADMIN — FENOFIBRATE 54 MG: 54 TABLET ORAL at 09:15

## 2018-01-12 RX ADMIN — GABAPENTIN 600 MG: 600 TABLET, FILM COATED ORAL at 21:21

## 2018-01-12 RX ADMIN — PROPRANOLOL HYDROCHLORIDE 20 MG: 20 TABLET ORAL at 09:16

## 2018-01-12 RX ADMIN — ATORVASTATIN CALCIUM 20 MG: 20 TABLET, FILM COATED ORAL at 09:16

## 2018-01-12 RX ADMIN — QUETIAPINE 50 MG: 25 TABLET ORAL at 15:06

## 2018-01-12 RX ADMIN — ASPIRIN 81 MG: 81 TABLET, COATED ORAL at 09:16

## 2018-01-12 RX ADMIN — GABAPENTIN 600 MG: 600 TABLET, FILM COATED ORAL at 09:15

## 2018-01-12 RX ADMIN — METFORMIN HYDROCHLORIDE 1000 MG: 500 TABLET, EXTENDED RELEASE ORAL at 09:15

## 2018-01-12 RX ADMIN — CLONIDINE HYDROCHLORIDE 0.1 MG: 0.1 TABLET ORAL at 21:22

## 2018-01-12 RX ADMIN — VORTIOXETINE 10 MG: 5 TABLET, FILM COATED ORAL at 09:15

## 2018-01-12 ASSESSMENT — ACTIVITIES OF DAILY LIVING (ADL)
LAUNDRY: WITH SUPERVISION
AMBULATION: 0-->INDEPENDENT
TOILETING: 0-->INDEPENDENT
FALL_HISTORY_WITHIN_LAST_SIX_MONTHS: NO
BATHING: 0-->INDEPENDENT
GROOMING: INDEPENDENT
SWALLOWING: 0-->SWALLOWS FOODS/LIQUIDS WITHOUT DIFFICULTY
TRANSFERRING: 0-->INDEPENDENT
ORAL_HYGIENE: INDEPENDENT
DRESS: 0-->INDEPENDENT
RETIRED_EATING: 0-->INDEPENDENT
COGNITION: 0 - NO COGNITION ISSUES REPORTED
RETIRED_COMMUNICATION: 0-->UNDERSTANDS/COMMUNICATES WITHOUT DIFFICULTY
DRESS: SCRUBS (BEHAVIORAL HEALTH)

## 2018-01-12 NOTE — PLAN OF CARE
Problem: Patient Care Overview  Goal: Plan of Care/Patient Progress Review  Outcome: No Change  Vss, AOx4. Pt complained of anxiety, seroquel prn given x1. Headache at 4/10, managed with tylenol. Door alarm on. Regular diet, up independently in the room.

## 2018-01-12 NOTE — CONSULTS
[facility] Psychiatric Consult Progress Note    Interval History:   Pt seen, chart reviewed, case discussed with nursing staff and treating clinicians. Pt is presently on a court hold supporting commitment. Psychiatry was asked to render an opinion on whether or not pt is appropriate for transfer to 77 vs. Detox. At this point, recommendation is that pt be transferred to Psychiatry for further medication management during the commitment process. He complains of insomnia, restless legs, anxiety. Increased Trintellix to 10mg qam. Dr. Rodriguez discussed the immediate negative effects of benzodiazapine use including increased fall risk and lowered IQ. Discussed addiction risk. Also mentioned the long-term detrimental effects including increased risk of dementia. Pt verbalized understanding.  He continues to have poor insight, believed that he will be allowed to leave next week.     Review of systems:   10 point Review of Systems completed by Dr. Rodriguez, is negative other than noted in the HPI     Medications:       vortioxetine  10 mg Oral Daily     QUEtiapine  300 mg Oral At Bedtime     PHENobarbital  32.4 mg Oral At Bedtime     metFORMIN  500 mg Oral Daily with supper     pramipexole  1.5 mg Oral TID     mirtazapine  30 mg Oral At Bedtime     metFORMIN  1,000 mg Oral Daily     allopurinol  300 mg Oral Daily     aspirin EC EC tablet 81 mg  81 mg Oral Daily     atorvastatin (LIPITOR) tablet 20 mg  20 mg Oral Daily     cloNIDine (CATAPRES) tablet 0.1 mg  0.1 mg Oral BID     fenofibrate  54 mg Oral Daily     gabapentin (NEURONTIN) tablet 600 mg  600 mg Oral TID     polyethylene glycol  17 g Oral Daily     propranolol (INDERAL) tablet 20 mg  20 mg Oral TID     tamsulosin  0.4 mg Oral Daily     miconazole, QUEtiapine, glucose **OR** dextrose **OR** glucagon, albuterol, fexofenadine (ALLEGRA) tablet 180 mg, acetaminophen, acetaminophen, naloxone, ondansetron **OR** ondansetron    Mental Status  Examination:     Appearance:  appeared older than stated age, poorly groomed and fatigued  Eye Contact:  intense, Stares blankly, repeatedly blinking  Speech:  clear, coherent and normal prosody, very irritable tone  Language:Normal  Psychomotor Behavior:  evidence of tics and Blinking repeatedly  Mood:  anxious and depressed, also expresses being very angry about being here  Affect:  intensity is blunted and intensity is flat,   Thought Process:  logical, linear and goal oriented no loose associations, perseverates about anxiety, poor sleep, restless legs.   Thought Content:  no evidence of suicidal ideation or homicidal ideation  Oriented to:  time, person, and place  Attention Span and Concentration:  limited  Recent and Remote Memory:  limited  Fund of Knowledge: Poor  Muscle Strength and Tone: normal  Gait and Station: Normal  Insight:  limited  Judgment:  poor        Labs/Vitals:     Recent Results (from the past 24 hour(s))   Glucose by meter    Collection Time: 01/11/18  1:22 PM   Result Value Ref Range    Glucose 137 (H) 70 - 99 mg/dL   Glucose by meter    Collection Time: 01/11/18  6:31 PM   Result Value Ref Range    Glucose 122 (H) 70 - 99 mg/dL   Glucose by meter    Collection Time: 01/11/18  8:59 PM   Result Value Ref Range    Glucose 153 (H) 70 - 99 mg/dL   Glucose by meter    Collection Time: 01/12/18  2:43 AM   Result Value Ref Range    Glucose 124 (H) 70 - 99 mg/dL   Glucose by meter    Collection Time: 01/12/18  9:21 AM   Result Value Ref Range    Glucose 115 (H) 70 - 99 mg/dL     B/P: 114/72, T: 98.1, P: 73, R: 14    Impression:   Dhruv continues to display very poor insight.  This morning he is extremely irritable with me, almost paranoid.  We will make some adjustments in his Seroquel to help sleep.  I think we need to keep an eye on his level of irritability particularly in light of the fact that he is also on Mirapex, which is a dopamine agonist.  He looks depressed but also seems to have a  paranoid flavor.  He is being tapered gradually off of benzodiazepines using phenobarbital.    DIagnoses:   1.  Delirium secondary to polypharmacy, resolved  2.  Opiate use disorder  3.  Benzodiazepine use disorder  4.  Anxiety disorder not otherwise specified  5.  Rule out mood disorder with psychotic features  6.  Rule out intellectual disability  7. RLS         Plan:   1. Written information given on medications. Side effects, risks, benefits reviewed.  2.  Continue phenobarbital taper, last dose tonight.  3.  Increase Trintellix to 10mg qam.  4.  Continue gabapentin, off all benzodiazepines and narcotics.  5.  Continue court hold, examination and hearing next week.  6.  Transfer to .    Attestation:  Patient has been seen and evaluated by me,  Jr Rodriguez MD

## 2018-01-12 NOTE — PROGRESS NOTES
"BRIEF NUTRITION ASSESSMENT      REASON FOR ASSESSMENT:  LOS      CURRENT DIET AND INTAKE:  Diet:  Regular              Chart reviewed  Note pt on 72 hr hold  Meal records indicate pt has been ordering 2 large meals per day  Breakfast today - 2 x Amharic toast, hash browns, 4 strips vasquez, yogurt,  2 x OJ  Flowsheets reflect intake has been 100%  RNs reports good appetite    ANTHROPOMETRICS:  Height: 5'8\"  Weight: (1/9) 107 kg  BMI: 35.8 kg/m2  IBW:  70 kg  Weight Status: Obesity Grade II BMI 35-39.9  %IBW: 153%  Weight History:   Wt Readings from Last 10 Encounters:   01/09/18 107 kg (235 lb 14.3 oz)   11/24/16 97.5 kg (215 lb)   08/20/16 99.8 kg (220 lb)   06/26/16 97.5 kg (215 lb)   06/08/16 100.1 kg (220 lb 9.6 oz)   06/03/16 99.8 kg (220 lb)   05/11/16 90.7 kg (200 lb)   04/22/16 95.3 kg (210 lb)   12/08/15 100.8 kg (222 lb 4.8 oz)   11/15/15 99.8 kg (220 lb)         LABS:  Labs noted    MALNUTRITION:  Patient does not meet two of the following criteria necessary for diagnosing malnutrition: significant weight loss, reduced intake, subcutaneous fat loss, muscle loss or fluid retention    NUTRITION INTERVENTION:  Nutrition Diagnosis:  No nutrition diagnosis at this time.    Implementation:  Nutrition Education ---> No education needs assessed at this time    FOLLOW UP/MONITORING:   Will re-evaluate in 7 - 10 days, or sooner, if re-consulted.          "

## 2018-01-12 NOTE — PLAN OF CARE
Problem: Patient Care Overview  Goal: Plan of Care/Patient Progress Review  Outcome: Improving  Pt c/o of lower back pain which he states is a chronic problem, rates a 6 level. VSS. Lungs clear. Has been cooperative with cares. He is aware that he will transfer to station [facility] this afternoon.

## 2018-01-12 NOTE — DISCHARGE SUMMARY
[facility]    Discharge Summary  Hospitalist    Date of Admission:  1/5/2018  Date of Discharge:  1/12/2018  Discharging Provider: Caryn Alves MD  Date of Service (when I saw the patient): 01/12/18    Discharge Diagnoses     Chronic narcotic and benzodiazepine dependence    Delirium due to polypharmacy    Anxiety    Diabetes mellitus type II    Benign essential HTN    H/o Gout    Hyperlipidemia    Restless leg syndrome    History of Present Illness   Dhruv Shaikh is a 47-year-old gentleman with chronic narcotic dependence presented with inability to take care of himself.    Hospital Course   Dhruv Shaikh was admitted on 1/5/2018.  The following problems were addressed during his hospitalization:     Chronic narcotic and benzodiazepine dependence  Delirium due to polypharmacy  Anxiety  Patient has a long-standing history of narcotic and benzodiazepine dependence. Reportedly also has history of poly substance dependence and major depression. Given multiple evaluations for the same problem and ongoing dependence, pt was committed.   -Discontinued prior to admission narcotics and benzodiazepines, and started on phenobarbital taper per psychiatrist, appreciated. Subsequently patient on court hold.   - transferred to inpatient psychiatric unit.  -On Seroquel and Trintellex per psych.     Diabetes mellitus type II:    -PTA is on metformin, total 1500 mg a day, Hb A1C was 7 .6, and BS not adequately controlled, and so dose increased to 1,000 mg BID at discharge.      Hypertension:    -Continued PTA clonidine and propranolol   -Losartan on hold due to soft/borderline BP, not needed at discharge.     History of gout:    -Continued with allopurinol.      History of hyperlipidemia:    -Continued atorvastatin and fenofibrate.      RLS:   On Mirapex   Magnesium as well added PO.    Caryn Alves MD  Hospitalist    Significant Results and Procedures   CXR and labs reports attached.    Pending Results    These results will be followed up by none    Unresulted Labs Ordered in the Past 30 Days of this Admission     No orders found from 11/6/2017 to 1/6/2018.          Code Status   Full Code       Primary Care Physician   [facility]    Constitutional: AAOX3, withdrawn, flat affect   Respiratory:  CTA bilaterally, normal work of breathing  Cardiovascular: RRR, No murmur  GI: Soft, Non- tender, BS- normoactive  Skin/Integument: Warm and dry, no rashes  MSK: No joint deformity or swelling, no edema  Neuro: CN- grossly intact; no tremors    Discharge Disposition   Transferred to inpatient psychiatric unit  Condition at discharge: Stable    Consultations This Hospital Stay   PSYCHIATRY IP CONSULT  PHYSICAL THERAPY ADULT IP CONSULT  SOCIAL WORK IP CONSULT  PSYCHIATRY IP CONSULT  PSYCHIATRY IP CONSULT  PSYCHIATRY IP CONSULT  PSYCHIATRY IP CONSULT  PSYCHIATRY IP CONSULT  PSYCHIATRY IP CONSULT  PSYCHIATRY IP CONSULT    Time Spent on this Encounter   ICaryn, personally saw the patient today and spent less than or equal to 30 minutes discharging this patient.    Discharge Orders     Reason for your hospital stay   Opioid and benzo dependence.     Activity - Up ad radha     Full Code     Advance Diet as Tolerated   Follow this diet upon discharge: Orders Placed This Encounter     Regular Diet Adult       Discharge Medications   Current Discharge Medication List      START taking these medications    Details   mirtazapine (REMERON) 45 MG tablet Take 1 tablet (45 mg) by mouth At Bedtime  Qty: 30 tablet    Associated Diagnoses: Depression with anxiety      vortioxetine (TRINTELLIX/BRINTELLIX) 10 MG tablet Take 1 tablet (10 mg) by mouth daily    Associated Diagnoses: Depression with anxiety      pramipexole (MIRAPEX) 1.5 MG tablet Take 1 tablet (1.5 mg) by mouth 3 times daily    Associated Diagnoses: Restless leg syndrome      !! QUEtiapine (SEROQUEL) 300 MG tablet Take 1 tablet (300 mg) by mouth At Bedtime  Qty:  60 tablet    Associated Diagnoses: Depression with anxiety      !! QUEtiapine (SEROQUEL) 25 MG tablet Take 1-2 tablets (25-50 mg) by mouth every 4 hours as needed (agitation/anxiety)  Qty: 60 tablet    Associated Diagnoses: Depression with anxiety      PHENobarbital (LUMINAL) 32.4 MG TABS tablet Take 1 tablet (32.4 mg) by mouth At Bedtime  Qty: 60 tablet    Associated Diagnoses: Opioid dependence with other opioid-induced disorder (H)      multivitamin, therapeutic with minerals (MULTI-VITAMIN) TABS tablet Take 1 tablet by mouth daily  Qty: 100 tablet, Refills: 3    Associated Diagnoses: Takes dietary supplements      magnesium 250 MG tablet Take 1 tablet by mouth daily  Qty: 30 tablet    Associated Diagnoses: Restless leg syndrome       !! - Potential duplicate medications found. Please discuss with provider.      CONTINUE these medications which have CHANGED    Details   !! metFORMIN (GLUCOPHAGE-XR) 500 MG 24 hr tablet Take 1 tablet (500 mg) by mouth daily (with dinner)  Qty: 30 tablet    Associated Diagnoses: Type 2 diabetes mellitus without complication, without long-term current use of insulin (H)      !! metFORMIN (GLUCOPHAGE-XR) 500 MG 24 hr tablet Take 2 tablets (1,000 mg) by mouth daily (with breakfast)  Qty: 30 tablet    Associated Diagnoses: Type 2 diabetes mellitus without complication, without long-term current use of insulin (H)       !! - Potential duplicate medications found. Please discuss with provider.      CONTINUE these medications which have NOT CHANGED    Details   ATORVASTATIN CALCIUM PO Take 20 mg by mouth daily      polyethylene glycol (MIRALAX/GLYCOLAX) Packet Take 1 packet by mouth daily      PROPRANOLOL HCL PO Take 20 mg by mouth 3 times daily      CLONIDINE HCL PO Take 0.1 mg by mouth 2 times daily      tamsulosin (FLOMAX) 0.4 MG capsule Take 0.4 mg by mouth daily      ASPIRIN EC PO Take 81 mg by mouth daily      GABAPENTIN PO Take 600 mg by mouth 3 times daily      allopurinol  (ZYLOPRIM) 300 MG tablet Take 300 mg by mouth daily.      Fexofenadine HCl (ALLEGRA PO) Take 180 mg by mouth daily as needed for allergies      albuterol (PROAIR HFA/PROVENTIL HFA/VENTOLIN HFA) 108 (90 BASE) MCG/ACT Inhaler Inhale 1-2 puffs into the lungs every 4 hours as needed for shortness of breath / dyspnea or wheezing      Fenofibrate (TRICOR PO) Take 48 mg by mouth daily         STOP taking these medications       CLOMIPRAMINE HCL PO Comments:   Reason for Stopping:         Zolpidem Tartrate (AMBIEN PO) Comments:   Reason for Stopping:         HYDROcodone-acetaminophen (LORTAB)  MG per tablet Comments:   Reason for Stopping:         CLONAZEPAM PO Comments:   Reason for Stopping:         CYCLOBENZAPRINE HCL PO Comments:   Reason for Stopping:         LOSARTAN POTASSIUM PO Comments:   Reason for Stopping:             Allergies   Allergies   Allergen Reactions     Ace Inhibitors      Dust Mites      Mold      Pollen Extract      Weed [Grass]      Data   Most Recent 3 CBC's:  Recent Labs   Lab Test  01/06/18   1053  01/05/18   1407  08/20/16   0338   WBC  8.5  10.3  8.8   HGB  14.5  15.6  14.1   MCV  92  90  92   PLT  277  287  240      Most Recent 3 BMP's:  Recent Labs   Lab Test  01/06/18   1053  01/05/18   1407  08/20/16   0338   NA  138  135  141   POTASSIUM  3.5  3.4  4.1   CHLORIDE  103  99  106   CO2  29  27  30   BUN  11  12  9   CR  0.81  0.88  0.87   ANIONGAP  6  9  5   WILDER  8.9  9.3  8.6   GLC  247*  224*  105*     Most Recent 2 LFT's:  Recent Labs   Lab Test  01/05/18   1407  12/04/15   1900   AST  22  12   ALT  39  34   ALKPHOS  140  78   BILITOTAL  0.8  0.4     Most Recent INR's and Anticoagulation Dosing History:  Anticoagulation Dose History     Recent Dosing and Labs Latest Ref Rng & Units 11/15/2015    INR 0.86 - 1.14 0.98        Most Recent 3 Troponin's:  Recent Labs   Lab Test  01/05/18   1407   TROPI  <0.015     Most Recent Cholesterol Panel:No lab results found.  Most Recent 6 Bacteria  Isolates From Any Culture (See EPIC Reports for Culture Details):  Recent Labs   Lab Test  08/20/16   0614   CULT  No growth     Most Recent TSH, T4 and A1c Labs:  Recent Labs   Lab Test  01/06/18   1053   12/05/15   0743   TSH   --    --   0.31*   T4   --    --   0.72*   A1C  7.6*   < >   --     < > = values in this interval not displayed.     Results for orders placed or performed during the hospital encounter of 01/05/18   XR Chest 2 Views    Narrative    CHEST TWO VIEWS  1/5/2018 2:53 PM    HISTORY:  Weak.     COMPARISON:  None.      Impression    IMPRESSION:  Mild linear scarring or atelectasis at the lung bases. No  focal infiltrates. Otherwise negative.     LEAH NICOLE MD

## 2018-01-12 NOTE — PLAN OF CARE
"Problem: Anxiety (Adult)  Goal: Identify Related Risk Factors and Signs and Symptoms  Related risk factors and signs and symptoms are identified upon initiation of Human [employer] Clinical Practice Guideline (CPG).   Outcome: No Change  [place] from [facility] [address] on a court hold. He was admitted to the [facility]. When given the option to move to the SDU, patient elected to stay in the [facility] as it was a private room. Per the patient his hearing is on Tuesday at 2:15 pm. The  will pick him up at 1:00 pm. Patient was initially admitted for polypharmacy and anxiety. Per dec report the patient's mother called the police to do a wellness check.. They found the apartment in disarray with open pill bottles and a gun. Patient states that he was sad that he a to cancel a trip to see his dad. I was depressed and that made my anxiety and pain worse. I took more medication than prescribed because I needed it. Patient seems to have little insight into his chemical dependency. \"I know that it is bad, but I need it, it is the only thing that works.\" Patient has a history of chronic back pain. Patient is pleasant and cooperative. Seems to be accepting of the situation. Nursing assessment complete including patient and medication profiles. Risk assessments completed addressing suicide,fall,skin,nutrition and safety issues. Care plan initiated. Assessments reviewed with physician and admit orders received. Welcome packet reviewed with patient. Information reviewed includes getting emergency help, preventing infections, understanding your care, using medication safely, reducing falls, preventing pressure ulcers, smoking cessation, powerful choices and Patients Bill of Rights. Pt. given tour of the unit and instruction on use of facility including emergency call light. Program schedule reviewed with patient. Questions regarding the unit addressed. Pt. Search completed and belongings inventoried.          "

## 2018-01-12 NOTE — PROGRESS NOTES
01/12/18 1622   Patient Belongings   Did you bring any home meds/supplements to the hospital?  No   Patient Belongings other (see comments)   Disposition of Belongings Locker   Belongings Search Yes   Clothing Search Yes   Second Staff Taylor     1. Mail  2. Court paperwork  3. Notebooks x2  4. Calculator   5. Textbooks x3  6. Pair of jeans   7. White Shirt   8. Pair of underwear  9. Pair of Socks  10. Pair of crocs   11. Cell phone  12. Glasses case   13. Brown winter jacket  14. Keys x3  15. Wallet    Business cards    Reward cards    [employer] card    Admission:  I am responsible for any personal items that are not sent to the safe or pharmacy.  [facility] is not responsible for loss, theft or damage of any property in my possession.    Signature:  _________________________________ Date: _______  Time: _____                                              Staff Signature:  ____________________________ Date: ________  Time: _____      2nd Staff person, if patient is unable/unwilling to sign:    Signature: ________________________________ Date: ________  Time: _____     Discharge:  [facility] has returned all of my personal belongings:    Signature: _________________________________ Date: ________  Time: _____                                          Staff Signature:  ____________________________ Date: ________  Time: _____

## 2018-01-12 NOTE — TELEPHONE ENCOUNTER
S - Liliam PETIT from [facility] calling with request for transfer of 48 yo male to psych per Dr. Rodriguez's recommendations    B - Pt was on medical for etoh withdrawal, had some underlying anxiety, insomnia and restless leg syndrome.  Pt under commitment process.  See Dr. Rodriguez's consult note for reasoning for psychiatric admission and criteria.      A - under commitment / court hold    R - Dr. Rodriguez accepting for himself / st [facility]    12:58 pm - charge RN on st 77 reviewing to see if they have appropriate bed to accept patient, will call intake.    1:40 pm  - per charge RN on [address], pt will need to start in [facility], will be able to accept today.      1:43 pm - attempted to notify st [facility] of disposition, charge RN was not available and no one could take the info.  Left message for charge RN to call Intake for dispo.

## 2018-01-12 NOTE — PLAN OF CARE
Problem: Patient Care Overview  Goal: Plan of Care/Patient Progress Review  Outcome: No Change  A&O x 4. VSS on room air. Lung sounds clear. . Up independently in room. Slept well throughout the night. Door alarm on. Continue to monitor.

## 2018-01-12 NOTE — IP AVS SNAPSHOT
[place] [address] [place] [address]    Phone:  [phone]                                       After Visit Summary   1/12/2018    Dhruv Shaikh    MRN: [id number]           After Visit Summary Signature Page     I have received my discharge instructions, and my questions have been answered. I have discussed any challenges I see with this plan with the nurse or doctor.    ..........................................................................................................................................  Patient/Patient Representative Signature      ..........................................................................................................................................  Patient Representative Print Name and Relationship to Patient    ..................................................               ................................................  Date                                            Time    ..........................................................................................................................................  Reviewed by Signature/Title    ...................................................              ..............................................  Date                                                            Time

## 2018-01-12 NOTE — IP AVS SNAPSHOT
MRN:[id number]                      After Visit Summary   1/12/2018    Dhruv Shaikh    MRN: [id number]           Thank you!     Thank you for choosing [facility] for your care. Our goal is always to provide you with excellent care.        Patient Information     Date Of Birth          1970        Designated Caregiver       Most Recent Value    Caregiver    Will someone help with your care after discharge? yes    Name of designated caregiver Mother    Phone number of caregiver See chart    Caregiver address See chart      About your hospital stay     You were admitted on:  January 12, 2018 You last received care in the:  [facility]    You were discharged on:  January 19, 2018       Who to Call     For medical emergencies, please call 911.  For non-urgent questions about your medical care, please call your primary care provider or clinic, None          Attending Provider     Provider Specialty    Jr Rodriguez MD Psychiatry       Primary Care Provider Fax #    [facility] [phone]      Further instructions from your care team       Behavioral Discharge Planning and Instructions    Summary:   Admitted with chronic narcotic and benzodiazepine dependence, delirium due to polypharmacy, and anxiety.    Main Diagnosis:  Delirium secondary to polypharmacy, resolved; Opioid Use Disorder; Sedative/Hypnotic Use Disorder; Anxiety Disorder, unspecified; Rule out Mood Disorder with psychotic features; Rule out Intellectual Disability; Restless Leg Syndrome    Major Treatments, Procedures and Findings:  Psychiatric assessment. Medication adjustment. Rule 25 chemical dependency assessment. Petition for civil commitment filed while on medical unit.     Symptoms to Report: Increased confusion, Losing more sleep or sleeping too much, Mood getting worse or Thoughts of suicide    Lifestyle Adjustment:  Follow all treatment recommendations. Develop and follow safety plan.  You have been granted a stay of commitment by [facility]. This means that if you follow all the terms of the commitment, including completing outpatient chemical dependency treatment, going to all doctor's appointments, and taking all medications as prescribed by your doctor, this commitment will  in six months following the order from the court. For specifics regarding the stay of commitment, please contact your county  or .     Psychiatry Follow-up:     You have an appointment for medication management with Dr. Jr Rodriguez at [facility] on  at 3:00 pm.  You can discuss further follow up care at this appointment.     [facility]  [address], [address]  [place]  [address]  Phone:  [phone] / Fax:  [phone]    As a provision of your stay of commitment you need to complete an outpatient chemical dependency program. Per the court you will need to work with your Rule 25 chemical dependency , Chelsea Echevarria, as well as Ricky Pitts, [facility] , to find an appropriate treatment setting. Ricky Pitts will be your temporary [facility]  until a permanent one is assigned. He can be reached at [phone].     You had a Rule 25 chemical dependency assessment done on 18 by Chelsea Echevarria [facility] with [facility]. Please follow up with her regarding finding an appropriate treatment facility. She can be reached at [phone] / [phone] fax.     Resources:   Crisis Intervention: 236.797.6359 or 813-216-7220 (TTY: 261.199.7537).  Call anytime for help.  National [place] on Mental Illness (www.mn.ivone.org): 925.303.4887 or 652-005-1240.  National Suicide Prevention Line (www.mentalhealthmn.org): 921-952-RJXI (3270)  COPE (Crisis Services for Adults) in [facility]: 126.565.1124 (Available )    General Medication Instructions:   See  "your medication sheet(s) for instructions.   Take all medicines as directed.  Make no changes unless your doctor suggests them.   Go to all your doctor visits.  Be sure to have all your required lab tests. This way, your medicines can be refilled on time.  Do not use any drugs not prescribed by your doctor.  Avoid alcohol.      Pending Results     No orders found from 1/10/2018 to 2018.            Statement of Approval     Ordered          18 1650  I have reviewed and agree with all the recommendations and orders detailed in this document.  EFFECTIVE NOW     Approved and electronically signed by:  Jr Rodriguez MD             Admission Information     Date & Time Provider Department Dept. Phone    2018 Jr Rodriguez MD [facility] [phone]      Your Vitals Were     Blood Pressure Pulse Temperature Respirations Height Weight    136/87 86 98.3  F (36.8  C) (Oral) 16 1.753 m (5' 9\") 104.3 kg (230 lb)    BMI (Body Mass Index)                   33.97 kg/m2           MyChart Information     "[employer]" lets you send messages to your doctor, view your test results, renew your prescriptions, schedule appointments and more. To sign up, go to [email or web address]" . Click on \"Log in\" on the left side of the screen, which will take you to the Welcome page. Then click on \"Sign up Now\" on the right side of the page.     You will be asked to enter the access code listed below, as well as some personal information. Please follow the directions to create your username and password.     Your access code is: [email or web address]  Expires: 2018  9:26 AM     Your access code will  in 90 days. If you need help or a new code, please call your [place] clinic or [phone].        Care EveryWhere ID     This is your Care EveryWhere ID. This could be used by other organizations to access your [place] medical records  [id number]        Equal Access to Services     RENETTA BUSCH AH: " Hadii lisa teran Soomaali, waaxda luqadaha, qaybta kaalmada cece, ashlie kamala claryisma lucianoclau immanuelramona ladaoisma elias. So [facility] [phone].    ATENCIÓN: Si habla nayeli, tiene a bautista disposición servicios gratuitos de asistencia lingüística. Llame al [phone].    We comply with applicable federal civil rights laws and [place] laws. We do not discriminate on the basis of race, color, national origin, age, disability, sex, sexual orientation, or gender identity.               Review of your medicines      CONTINUE these medicines which may have CHANGED, or have new prescriptions. If we are uncertain of the size of tablets/capsules you have at home, strength may be listed as something that might have changed.        Dose / Directions    gabapentin 300 MG capsule   Commonly known as:  NEURONTIN   This may have changed:  medication strength   Used for:  Severe recurrent major depression without psychotic features (H)        Dose:  600 mg   Take 2 capsules (600 mg) by mouth 3 times daily   Quantity:  180 capsule   Refills:  0       metFORMIN osmotic 1000 MG 24 hr tablet   Commonly known as:  FORTAMET   This may have changed:    - medication strength  - when to take this  - Another medication with the same name was removed. Continue taking this medication, and follow the directions you see here.   Used for:  Type 2 diabetes mellitus without complication, without long-term current use of insulin (H)        Dose:  1000 mg   Take 1,000 mg by mouth 2 times daily (with meals)   Quantity:  60 tablet   Refills:  0       * QUEtiapine 100 MG tablet   Commonly known as:  SEROquel   This may have changed:    - medication strength  - how much to take  - when to take this   Used for:  Depression with anxiety        Dose:  100 mg   Take 1 tablet (100 mg) by mouth daily as needed (agitation/anxiety)   Quantity:  30 tablet   Refills:  0       * QUEtiapine 300 MG tablet   Commonly known as:  SEROquel   This may have changed:  when to  take this   Used for:  Depression with anxiety        Dose:  300 mg   Take 1 tablet (300 mg) by mouth 2 times daily   Quantity:  60 tablet   Refills:  0       vortioxetine 20 MG tablet   Commonly known as:  TRINTELLIX/BRINTELLIX   This may have changed:    - medication strength  - how much to take   Used for:  Depression with anxiety        Dose:  20 mg   Take 1 tablet (20 mg) by mouth daily   Quantity:  30 tablet   Refills:  0       * Notice:  This list has 2 medication(s) that are the same as other medications prescribed for you. Read the directions carefully, and ask your doctor or other care provider to review them with you.      CONTINUE these medicines which have NOT CHANGED        Dose / Directions    albuterol 108 (90 BASE) MCG/ACT Inhaler   Commonly known as:  PROAIR HFA/PROVENTIL HFA/VENTOLIN HFA        Dose:  1-2 puff   Inhale 1-2 puffs into the lungs every 4 hours as needed for shortness of breath / dyspnea or wheezing   Refills:  0       ALLEGRA PO        Dose:  180 mg   Take 180 mg by mouth daily as needed for allergies   Refills:  0       allopurinol 300 MG tablet   Commonly known as:  ZYLOPRIM        Dose:  300 mg   Take 300 mg by mouth daily.   Refills:  0       ASPIRIN EC PO        Dose:  81 mg   Take 81 mg by mouth daily   Refills:  0       ATORVASTATIN CALCIUM PO        Dose:  20 mg   Take 20 mg by mouth daily   Refills:  0       CLONIDINE HCL PO        Dose:  0.1 mg   Take 0.1 mg by mouth 2 times daily   Refills:  0       FLOMAX 0.4 MG capsule   Generic drug:  tamsulosin        Dose:  0.4 mg   Take 0.4 mg by mouth daily   Refills:  0       magnesium 250 MG tablet   Used for:  Restless leg syndrome        Dose:  1 tablet   Take 1 tablet by mouth daily   Quantity:  30 tablet   Refills:  0       mirtazapine 45 MG tablet   Commonly known as:  REMERON   Used for:  Depression with anxiety        Dose:  45 mg   Take 1 tablet (45 mg) by mouth At Bedtime   Quantity:  30 tablet   Refills:  0        multivitamin, therapeutic with minerals Tabs tablet   Used for:  Takes dietary supplements        Dose:  1 tablet   Take 1 tablet by mouth daily   Quantity:  100 tablet   Refills:  3       polyethylene glycol Packet   Commonly known as:  MIRALAX/GLYCOLAX        Dose:  1 packet   Take 1 packet by mouth daily   Refills:  0       pramipexole 1.5 MG tablet   Commonly known as:  MIRAPEX   Used for:  Restless leg syndrome        Dose:  1.5 mg   Take 1 tablet (1.5 mg) by mouth 3 times daily   Refills:  0       PROPRANOLOL HCL PO        Dose:  20 mg   Take 20 mg by mouth 3 times daily   Refills:  0       TRICOR PO        Dose:  54 mg   Take 54 mg by mouth daily   Refills:  0         STOP taking     PHENobarbital 32.4 MG Tabs tablet   Commonly known as:  LUMINAL                Where to get your medicines      These medications were sent to [facility] - [address]  [address] [facility] [address], [place] [address]     Phone:  [phone]     gabapentin 300 MG capsule    metFORMIN osmotic 1000 MG 24 hr tablet    mirtazapine 45 MG tablet    QUEtiapine 100 MG tablet    QUEtiapine 300 MG tablet    vortioxetine 20 MG tablet                Protect others around you: Learn how to safely use, store and throw away your medicines at www.disposemymeds.org.             Medication List: This is a list of all your medications and when to take them. Check marks below indicate your daily home schedule. Keep this list as a reference.      Medications           Morning Afternoon Evening Bedtime As Needed    albuterol 108 (90 BASE) MCG/ACT Inhaler   Commonly known as:  PROAIR HFA/PROVENTIL HFA/VENTOLIN HFA   Inhale 1-2 puffs into the lungs every 4 hours as needed for shortness of breath / dyspnea or wheezing                                   ALLEGRA PO   Take 180 mg by mouth daily as needed for allergies                                   allopurinol 300 MG tablet   Commonly known as:  ZYLOPRIM   Take 300 mg by  mouth daily.   Last time this was given:  300 mg on 1/19/2018  7:36 AM                                   ASPIRIN EC PO   Take 81 mg by mouth daily   Last time this was given:  81 mg on 1/19/2018  7:35 AM                                ATORVASTATIN CALCIUM PO   Take 20 mg by mouth daily   Last time this was given:  20 mg on 1/19/2018  7:34 AM                                   CLONIDINE HCL PO   Take 0.1 mg by mouth 2 times daily   Last time this was given:  0.1 mg on 1/19/2018  7:36 AM                                   FLOMAX 0.4 MG capsule   Take 0.4 mg by mouth daily   Last time this was given:  0.4 mg on 1/19/2018  7:34 AM   Generic drug:  tamsulosin                                   gabapentin 300 MG capsule   Commonly known as:  NEURONTIN   Take 2 capsules (600 mg) by mouth 3 times daily   Last time this was given:  600 mg on 1/19/2018  7:35 AM                                         magnesium 250 MG tablet   Take 1 tablet by mouth daily                                metFORMIN osmotic 1000 MG 24 hr tablet   Commonly known as:  FORTAMET   Take 1,000 mg by mouth 2 times daily (with meals)   Last time this was given:  1,000 mg on 1/19/2018  7:34 AM                                mirtazapine 45 MG tablet   Commonly known as:  REMERON   Take 1 tablet (45 mg) by mouth At Bedtime   Last time this was given:  45 mg on 1/18/2018  9:08 PM                                   multivitamin, therapeutic with minerals Tabs tablet   Take 1 tablet by mouth daily   Last time this was given:  1 tablet on 1/19/2018  7:34 AM                                   polyethylene glycol Packet   Commonly known as:  MIRALAX/GLYCOLAX   Take 1 packet by mouth daily   Last time this was given:  17 g on 1/19/2018  7:36 AM                                   pramipexole 1.5 MG tablet   Commonly known as:  MIRAPEX   Take 1 tablet (1.5 mg) by mouth 3 times daily   Last time this was given:  1.5 mg on 1/19/2018  7:35 AM                                          PROPRANOLOL HCL PO   Take 20 mg by mouth 3 times daily   Last time this was given:  20 mg on 1/19/2018  7:35 AM                                   * QUEtiapine 100 MG tablet   Commonly known as:  SEROquel   Take 1 tablet (100 mg) by mouth daily as needed (agitation/anxiety)   Last time this was given:  300 mg on 1/19/2018 12:50 AM                                * QUEtiapine 300 MG tablet   Commonly known as:  SEROquel   Take 1 tablet (300 mg) by mouth 2 times daily   Last time this was given:  300 mg on 1/19/2018 12:50 AM                                TRICOR PO   Take 54 mg by mouth daily   Last time this was given:  54 mg on 1/19/2018  7:35 AM                                vortioxetine 20 MG tablet   Commonly known as:  TRINTELLIX/BRINTELLIX   Take 1 tablet (20 mg) by mouth daily   Last time this was given:  20 mg on 1/19/2018  7:34 AM                                * Notice:  This list has 2 medication(s) that are the same as other medications prescribed for you. Read the directions carefully, and ask your doctor or other care provider to review them with you.              More Information        Anxiety Reaction  Anxiety is the feeling we all get when we think something bad might happen. It is a normal response to stress and usually causes only a mild reaction. When anxiety becomes more severe, it can interfere with daily life. In some cases, you may not even be aware of what it is you re anxious about. There may also be a genetic link or it may be a learned behavior in the home.  Both psychological and physical triggers cause stress reaction. It's often a response to fear or emotional stress, real or imagined. This stress may come from home, family, work, or social relationships.  During an anxiety reaction, you may feel:    Helpless    Nervous    Depressed    Irritable  Your body may show signs of anxiety in many ways. You may experience:    Dry mouth    Shakiness    Dizziness    Weakness    Trouble  breathing    Breathing fast (hyperventilating)    Chest pressure    Sweating    Headache    Nausea    Diarrhea    Tiredness    Inability to sleep    Sexual problems  Home care    Try to locate the sources of stress in your life. They may not be obvious. These may include:    Daily hassles of life (traffic jams, missed appointments, car troubles, etc.)    Major life changes, both good (new baby, job promotion) and bad (loss of job, loss of loved one)    Overload: feeling that you have too many responsibilities and can't take care of all of them at once    Feeling helpless, feeling that your problems are beyond what you re able to solve    Notice how your body reacts to stress. Learn to listen to your body signals. This will help you take action before the stress becomes severe.    When you can, do something about the source of your stress. (Avoid hassles, limit the amount of change that happens in your life at one time and take a break when you feel overloaded).    Unfortunately, many stressful situations can't be avoided. It is necessary to learn how to better manage stress. There are many proven methods that will reduce your anxiety. These include simple things like exercise, good nutrition and adequate rest. Also, there are certain techniques that are helpful:    Relaxation    Breathing exercises    Visualization    Biofeedback    Meditation  For more information about this, consult your doctor or go to a local bookstore and review the many books and tapes available on this subject.  Follow-up care  If you feel that your anxiety is not responding to self-help measures, contact your doctor or make an appointment with a counselor. You may need short-term psychological counseling and temporary medicine to help you manage stress.  Call 911  Call your healthcare provider right away if any of these occur:    Trouble breathing    Confusion    Drowsiness or trouble wakening    Fainting or loss of consciousness    Rapid  heart rate    Seizure    New chest pain that becomes more severe, lasts longer, or spreads into your shoulder, arm, neck, jaw, or back  When to seek medical advice  Call your healthcare provider right away if any of these occur:    Your symptoms get worse    Severe headache not relieved by rest and mild pain reliever  Date Last Reviewed: 9/29/2015 2000-2017 [employer]. [address] [place] [address]. All rights reserved. This information is not intended as a substitute for professional medical care. Always follow your healthcare professional's instructions.

## 2018-01-13 LAB
GLUCOSE BLDC GLUCOMTR-MCNC: 130 MG/DL (ref 70–99)
GLUCOSE BLDC GLUCOMTR-MCNC: 132 MG/DL (ref 70–99)

## 2018-01-13 PROCEDURE — 00000146 ZZHCL STATISTIC GLUCOSE BY METER IP

## 2018-01-13 PROCEDURE — 25000132 ZZH RX MED GY IP 250 OP 250 PS 637: Performed by: PSYCHIATRY & NEUROLOGY

## 2018-01-13 PROCEDURE — 25000132 ZZH RX MED GY IP 250 OP 250 PS 637: Performed by: INTERNAL MEDICINE

## 2018-01-13 PROCEDURE — 99222 1ST HOSP IP/OBS MODERATE 55: CPT | Mod: AI | Performed by: INTERNAL MEDICINE

## 2018-01-13 PROCEDURE — 12400006 ZZH R&B MH INTERMEDIATE

## 2018-01-13 RX ORDER — SENNOSIDES 8.6 MG
8.6 TABLET ORAL 2 TIMES DAILY PRN
Status: DISCONTINUED | OUTPATIENT
Start: 2018-01-13 | End: 2018-01-19 | Stop reason: HOSPADM

## 2018-01-13 RX ORDER — METFORMIN HCL 500 MG
1000 TABLET, EXTENDED RELEASE 24 HR ORAL 2 TIMES DAILY WITH MEALS
Status: DISCONTINUED | OUTPATIENT
Start: 2018-01-13 | End: 2018-01-19 | Stop reason: HOSPADM

## 2018-01-13 RX ORDER — ACETAMINOPHEN 325 MG/1
650 TABLET ORAL EVERY 4 HOURS PRN
Status: DISCONTINUED | OUTPATIENT
Start: 2018-01-13 | End: 2018-01-15

## 2018-01-13 RX ORDER — MAGNESIUM OXIDE 400 MG/1
TABLET ORAL DAILY
Status: DISCONTINUED | OUTPATIENT
Start: 2018-01-13 | End: 2018-01-19 | Stop reason: HOSPADM

## 2018-01-13 RX ORDER — QUETIAPINE FUMARATE 50 MG/1
50-100 TABLET, FILM COATED ORAL EVERY 4 HOURS PRN
Status: DISCONTINUED | OUTPATIENT
Start: 2018-01-13 | End: 2018-01-19 | Stop reason: HOSPADM

## 2018-01-13 RX ADMIN — VORTIOXETINE 10 MG: 10 TABLET, FILM COATED ORAL at 08:40

## 2018-01-13 RX ADMIN — PROPRANOLOL HYDROCHLORIDE 20 MG: 10 TABLET ORAL at 21:43

## 2018-01-13 RX ADMIN — PROPRANOLOL HYDROCHLORIDE 20 MG: 10 TABLET ORAL at 16:33

## 2018-01-13 RX ADMIN — TAMSULOSIN HYDROCHLORIDE 0.4 MG: 0.4 CAPSULE ORAL at 08:40

## 2018-01-13 RX ADMIN — PROPRANOLOL HYDROCHLORIDE 20 MG: 10 TABLET ORAL at 08:41

## 2018-01-13 RX ADMIN — QUETIAPINE FUMARATE 300 MG: 200 TABLET, FILM COATED ORAL at 21:43

## 2018-01-13 RX ADMIN — METFORMIN HYDROCHLORIDE 1000 MG: 500 TABLET, EXTENDED RELEASE ORAL at 17:55

## 2018-01-13 RX ADMIN — METFORMIN HYDROCHLORIDE 1000 MG: 500 TABLET, EXTENDED RELEASE ORAL at 08:41

## 2018-01-13 RX ADMIN — CLONIDINE HYDROCHLORIDE 0.1 MG: 0.1 TABLET ORAL at 21:43

## 2018-01-13 RX ADMIN — GABAPENTIN 600 MG: 600 TABLET, FILM COATED ORAL at 21:43

## 2018-01-13 RX ADMIN — ASPIRIN 81 MG: 81 TABLET, COATED ORAL at 08:41

## 2018-01-13 RX ADMIN — SENNOSIDES 8.6 MG: 8.6 TABLET, FILM COATED ORAL at 21:43

## 2018-01-13 RX ADMIN — FENOFIBRATE 54 MG: 54 TABLET ORAL at 08:41

## 2018-01-13 RX ADMIN — ACETAMINOPHEN 650 MG: 325 TABLET, FILM COATED ORAL at 19:08

## 2018-01-13 RX ADMIN — ALLOPURINOL 300 MG: 300 TABLET ORAL at 08:41

## 2018-01-13 RX ADMIN — CLONIDINE HYDROCHLORIDE 0.1 MG: 0.1 TABLET ORAL at 08:41

## 2018-01-13 RX ADMIN — QUETIAPINE FUMARATE 100 MG: 50 TABLET, FILM COATED ORAL at 18:04

## 2018-01-13 RX ADMIN — GABAPENTIN 600 MG: 600 TABLET, FILM COATED ORAL at 16:33

## 2018-01-13 RX ADMIN — MIRTAZAPINE 45 MG: 45 TABLET, FILM COATED ORAL at 21:43

## 2018-01-13 RX ADMIN — PRAMIPEXOLE DIHYDROCHLORIDE 1.5 MG: 1.5 TABLET ORAL at 08:40

## 2018-01-13 RX ADMIN — POLYETHYLENE GLYCOL 3350 17 G: 17 POWDER, FOR SOLUTION ORAL at 08:40

## 2018-01-13 RX ADMIN — QUETIAPINE FUMARATE 50 MG: 25 TABLET ORAL at 12:24

## 2018-01-13 RX ADMIN — MULTIPLE VITAMINS W/ MINERALS TAB 1 TABLET: TAB at 08:41

## 2018-01-13 RX ADMIN — ATORVASTATIN CALCIUM 20 MG: 10 TABLET, FILM COATED ORAL at 08:41

## 2018-01-13 RX ADMIN — MAGNESIUM OXIDE TAB 400 MG (241.3 MG ELEMENTAL MG) 400 MG: 400 (241.3 MG) TAB at 13:12

## 2018-01-13 RX ADMIN — GABAPENTIN 600 MG: 600 TABLET, FILM COATED ORAL at 08:41

## 2018-01-13 RX ADMIN — PRAMIPEXOLE DIHYDROCHLORIDE 1.5 MG: 1.5 TABLET ORAL at 21:43

## 2018-01-13 RX ADMIN — PRAMIPEXOLE DIHYDROCHLORIDE 1.5 MG: 1.5 TABLET ORAL at 16:33

## 2018-01-13 NOTE — PLAN OF CARE
Problem: Anxiety (Adult)  Goal: Identify Related Risk Factors and Signs and Symptoms  Related risk factors and signs and symptoms are identified upon initiation of [facility] Clinical Practice Guideline (CPG).   Outcome: No Change  Pt reported poor sleep. Pt reports that he needs more seroquel to sleep. Pt appears depressed and disheveled. Pt attended groups but was withdrawn. Pt spent part of the shift on the SDU. Pt appeared depressed and anxious about his diabetes. Pt presents with a tense affect stating that the noise from other patients is making him anxious.

## 2018-01-13 NOTE — H&P
CHIEF COMPLAINT:  Mr. Dhruv Shaikh is being transferred from the sixth floor to inpatient psychiatry as he is on a hold and he is in the process of commitment.      HISTORY OF PRESENT ILLNESS:  Mr. Shaikh was admitted to the hospital for 7 days.  He was admitted with chronic narcotic and benzodiazepine dependence, delirium due to polypharmacy, and anxiety.  Over the course of the hospitalization, he had medication adjustment.  Specifically the prior to admission narcotics and benzodiazepines were discontinued.  He was started on a phenobarbital taper per the psychiatrist.  He is on Seroquel and Trintellix.        He is in the process of commitment now and he is staying on the inpatient psych unit during that process.      PAST MEDICAL HISTORY:   1.  Chronic back pain for which he has been on narcotics and following at a pain center.   2.  Diabetes.   3.  Hyperlipidemia.   4.  Hypertension.   5.  Gout, well controlled on allopurinol.   6.  Allergies.   7.  Constipation.   8.  History of prostate issue requiring surgery and then on Flomax.   9.  Ambien.      PAST SURGICAL HISTORY:   1.  Prostate surgery many years ago.   2.  [place] teeth extraction.      REVIEW OF SYSTEMS:  Unremarkable.  He has some nasal congestion which is not new for him.  He does not have any ear complaint, sore throat, chest pain, difficulty breathing, cough that will not go away, nausea, vomiting, diarrhea or constipation.  Currently he does not have any urinary complaints and no changes in his weight.      ALLERGIES:  ACE inhibitors cause cough.      SOCIAL HISTORY:  He works at [employer].        FAMILY HISTORY:  His mother's side of the family has diabetes.      CURRENT MEDICATIONS:   1.  Aspirin 81 mg   2.  Magnesium oxide.     3.  Albuterol as needed.   4.  Gabapentin 600 mg 3 times a day.   5.  Remeron 40 mg at bedtime.   6.  Trintellix 10 mg daily.   7.  Metformin 1000 mg in the morning and 500 mg at night.   8.  Allegra 180 mg.   9.   Lipitor 20 mg.    10.  Fenofibrate 54.   11.  Clonidine 0.1 mg twice a day.   12.  Mirapex 1.5 mg 3 times a day.   13.  Seroquel at bedtime.   14.  Seroquel p.r.n.   15.  Propranolol 20 mg 3 times a day.   16.  Allopurinol 300 mg daily.   17.  Phenobarbital 32.4 mg at bedtime.   18.  MiraLax as needed.   19.  Flomax 0.4 mg daily.   20.  Multivitamin.      PHYSICAL EXAMINATION:   GENERAL:  He is sitting in bed in no apparent distress or discomfort.   VITAL SIGNS:  Show temperature of 98.3, heart rate 75, blood pressure 127/76, respirations 17.   HEENT:  Pupils are equal, reactive to light and accommodation.  Extraocular movements are intact.   NECK:  Supple, no JVD.   CARDIOVASCULAR:  S1, S2 is heard, no S3, no murmur.   LUNGS:  Good air entry.  Clear to auscultation.   ABDOMEN:  Soft, nontender, no organomegaly, bowel sounds present.   CENTRAL NERVOUS SYSTEM:  He is alert and oriented x3.  His power is 5/5.  Gait and coordination is intact.      LABORATORY DATA:  He had a glucose of 132 and glucose of 149.  TSH is suppressed at 0.04, but with a normal free T4 at 0.84.      ASSESSMENT AND PLAN:  Mr. Shaikh is a 47-year-old gentleman who was admitted to the hospital for 7 days with chronic narcotic and benzodiazepine dependence and delirium due to polypharmacy who is being transferred to inpatient psychiatry as they pursue commitment for him.   1.  Delirium secondary to polypharmacy, this is resolved.   2.  Anxiety disorder and ruling out mood disorder with psychotic features, care per Psychiatry.   3.  Type 2 diabetes.  His last A1c done was on 01/06, it was 7.6%.  Will increase his Metformin slightly from 1000 mg and 500 mg in the evening to 1000 mg twice a day.     4.  Gout.  He states he has not had a flare in over 10 years since he has been placed on allopurinol so this will be continued.   5.  Hyperlipidemia.  Continue his Lipitor.   6.  Decreased TSH with a normal free T4.  Will just repeat this again because  it does not make sense to me.         CRIS CABRERA MD             D: 2018 14:45   T: 2018 15:55   MT: [place]      Name:     WARREN MANDUJANO   MRN:      [id number]        Account:      [id number]   :      1970           Admitted:     [id number]      Document: [id number]

## 2018-01-13 NOTE — PROGRESS NOTES
Patient complained of being disrupted by another patient this morning. He was talking so loud, and the staff were yelling at him. And the TV was so loud.

## 2018-01-13 NOTE — PROGRESS NOTES
[facility] Psychiatric Progress Note    Interval History:   Pt seen, chart reviewed, case discussed with nursing staff. The preceding clinical notes were perused and appreciated. Staff report that patient has been very anxious and has been requesting more medication. He says he was told by his psychiatrist that his current prn dose of Seroquel is low and he is therefore requesting an increase in the dose. He says he awakens after sleeping deeply for a couple hours and is unable to go back to sleep. He says he would like to have something that'll allow him to return to sleep. He denies current self harm thoughts, plans or intent. Tolerating medications well without significant side effects. He is also asking to resume PTA magnesium. He believes he is restless because his phenobarb taper is done.     Review of systems:   10 point Review of Systems completed by Dr. Marquez, is negative other than noted in the HPI     Medications:       magnesium  1 tablet Oral Daily     allopurinol  300 mg Oral Daily     aspirin EC EC tablet 81 mg  81 mg Oral Daily     atorvastatin (LIPITOR) tablet 20 mg  20 mg Oral Daily     cloNIDine (CATAPRES) tablet 0.1 mg  0.1 mg Oral BID     fenofibrate tablet 54 mg  54 mg Oral Daily     gabapentin (NEURONTIN) tablet 600 mg  600 mg Oral TID     metFORMIN  500 mg Oral Daily with supper     metFORMIN  1,000 mg Oral Daily with breakfast     mirtazapine  45 mg Oral At Bedtime     multivitamin, therapeutic with minerals  1 tablet Oral Daily     polyethylene glycol  17 g Oral Daily     pramipexole  1.5 mg Oral TID     propranolol (INDERAL) tablet 20 mg  20 mg Oral TID     QUEtiapine  300 mg Oral At Bedtime     tamsulosin  0.4 mg Oral Daily     vortioxetine  10 mg Oral Daily     QUEtiapine, albuterol, fexofenadine (ALLEGRA) tablet 180 mg    Mental Status Examination:     Appearance:  appeared older than stated age, poorly groomed and fatigued  Eye Contact:  intense, averts eye contact,  repeatedly blinking  Speech:  clear, coherent and normal prosody, very irritable tone  Language: Normal  Psychomotor Behavior:  evidence of tics  Mood:  anxious and depressed,   Affect:  intensity is exaggerated, intensity is heightened and irritable,   Thought Process:  logical, linear and goal oriented no loose associations, perseverates about anxiety, poor sleep, restless legs.   Thought Content:  no evidence of suicidal ideation or homicidal ideation  Oriented to:  time, person, and place  Attention Span and Concentration:  limited  Recent and Remote Memory:  limited  Fund of Knowledge: Poor  Muscle Strength and Tone: normal  Gait and Station: Normal  Insight:  limited  Judgment:  poor        Labs/Vitals:     Recent Results (from the past 24 hour(s))   Glucose by meter    Collection Time: 01/12/18  5:20 PM   Result Value Ref Range    Glucose 149 (H) 70 - 99 mg/dL   TSH with free T4 reflex    Collection Time: 01/12/18  6:20 PM   Result Value Ref Range    TSH 0.04 (L) 0.40 - 4.00 mU/L   T4 free    Collection Time: 01/12/18  6:20 PM   Result Value Ref Range    T4 Free 0.84 0.76 - 1.46 ng/dL   Glucose by meter    Collection Time: 01/13/18  8:14 AM   Result Value Ref Range    Glucose 132 (H) 70 - 99 mg/dL     B/P: 114/72, T: 98.1, P: 73, R: 14    Impression:   Dhruv continues to display very poor insight.  This morning he is extremely irritable with me, almost paranoid.  We will make some adjustments in his Seroquel to help sleep.  I think we need to keep an eye on his level of irritability particularly in light of the fact that he is also on Mirapex, which is a dopamine agonist.  He looks depressed but also seems to have a paranoid flavor.  He is being tapered gradually off of benzodiazepines using phenobarbital.    DIagnoses:   1.  Delirium secondary to polypharmacy, resolved  2.  Opioid use disorder  3.  Sedative/Hypnotic use disorder  4.  Anxiety disorder, unspecified  5.  Rule out mood disorder with psychotic  features  6.  Rule out intellectual disability  7.  RLS         Plan:   1. Written information given on medications. Side effects, risks, benefits reviewed.  2. Resume PTA Magnesium.  3. Use Seroquel at  q 4hrs and Seroquel 300 mg qhs may repeat x 1 before morning.  4. Dr. Rodriguez will assume care tomorrow or Monday.    Attestation:  Patient has been seen and evaluated by me,  Loly Marquez MD

## 2018-01-13 NOTE — H&P
Full H&P dictated.  Patient transferred from medical floor to inpatient psychiatry pending commitment procedures.  Increase metformin  Recheck TSH. Last check in 2015 was also slightly low. Check in AM with free t3 and free t4.

## 2018-01-14 LAB
GLUCOSE BLDC GLUCOMTR-MCNC: 135 MG/DL (ref 70–99)
T3FREE SERPL-MCNC: 2.1 PG/ML (ref 2.3–4.2)
T4 FREE SERPL-MCNC: 0.82 NG/DL (ref 0.76–1.46)
TSH SERPL DL<=0.005 MIU/L-ACNC: 0.07 MU/L (ref 0.4–4)

## 2018-01-14 PROCEDURE — 36415 COLL VENOUS BLD VENIPUNCTURE: CPT | Performed by: INTERNAL MEDICINE

## 2018-01-14 PROCEDURE — 84439 ASSAY OF FREE THYROXINE: CPT | Performed by: INTERNAL MEDICINE

## 2018-01-14 PROCEDURE — 84443 ASSAY THYROID STIM HORMONE: CPT | Performed by: INTERNAL MEDICINE

## 2018-01-14 PROCEDURE — 84481 FREE ASSAY (FT-3): CPT | Performed by: INTERNAL MEDICINE

## 2018-01-14 PROCEDURE — 12400006 ZZH R&B MH INTERMEDIATE

## 2018-01-14 PROCEDURE — 00000146 ZZHCL STATISTIC GLUCOSE BY METER IP

## 2018-01-14 PROCEDURE — 25000125 ZZHC RX 250: Performed by: PSYCHIATRY & NEUROLOGY

## 2018-01-14 PROCEDURE — 25000132 ZZH RX MED GY IP 250 OP 250 PS 637: Performed by: PSYCHIATRY & NEUROLOGY

## 2018-01-14 PROCEDURE — 25000132 ZZH RX MED GY IP 250 OP 250 PS 637: Performed by: INTERNAL MEDICINE

## 2018-01-14 PROCEDURE — 99232 SBSQ HOSP IP/OBS MODERATE 35: CPT | Performed by: INTERNAL MEDICINE

## 2018-01-14 RX ORDER — ONDANSETRON 4 MG/1
4 TABLET, FILM COATED ORAL EVERY 6 HOURS PRN
Status: DISCONTINUED | OUTPATIENT
Start: 2018-01-14 | End: 2018-01-19 | Stop reason: HOSPADM

## 2018-01-14 RX ADMIN — GABAPENTIN 600 MG: 600 TABLET, FILM COATED ORAL at 08:50

## 2018-01-14 RX ADMIN — VORTIOXETINE 10 MG: 10 TABLET, FILM COATED ORAL at 08:50

## 2018-01-14 RX ADMIN — ONDANSETRON 4 MG: 4 TABLET, FILM COATED ORAL at 12:05

## 2018-01-14 RX ADMIN — PROPRANOLOL HYDROCHLORIDE 20 MG: 10 TABLET ORAL at 20:48

## 2018-01-14 RX ADMIN — METFORMIN HYDROCHLORIDE 1000 MG: 500 TABLET, EXTENDED RELEASE ORAL at 17:34

## 2018-01-14 RX ADMIN — PRAMIPEXOLE DIHYDROCHLORIDE 1.5 MG: 1.5 TABLET ORAL at 20:48

## 2018-01-14 RX ADMIN — CLONIDINE HYDROCHLORIDE 0.1 MG: 0.1 TABLET ORAL at 20:50

## 2018-01-14 RX ADMIN — PROPRANOLOL HYDROCHLORIDE 20 MG: 10 TABLET ORAL at 15:53

## 2018-01-14 RX ADMIN — PRAMIPEXOLE DIHYDROCHLORIDE 1.5 MG: 1.5 TABLET ORAL at 15:53

## 2018-01-14 RX ADMIN — MULTIPLE VITAMINS W/ MINERALS TAB 1 TABLET: TAB at 08:50

## 2018-01-14 RX ADMIN — CLONIDINE HYDROCHLORIDE 0.1 MG: 0.1 TABLET ORAL at 08:51

## 2018-01-14 RX ADMIN — GABAPENTIN 600 MG: 600 TABLET, FILM COATED ORAL at 15:53

## 2018-01-14 RX ADMIN — PROPRANOLOL HYDROCHLORIDE 20 MG: 10 TABLET ORAL at 08:50

## 2018-01-14 RX ADMIN — ALLOPURINOL 300 MG: 300 TABLET ORAL at 08:51

## 2018-01-14 RX ADMIN — QUETIAPINE FUMARATE 300 MG: 200 TABLET, FILM COATED ORAL at 20:49

## 2018-01-14 RX ADMIN — METFORMIN HYDROCHLORIDE 1000 MG: 500 TABLET, EXTENDED RELEASE ORAL at 08:50

## 2018-01-14 RX ADMIN — QUETIAPINE FUMARATE 100 MG: 50 TABLET, FILM COATED ORAL at 04:23

## 2018-01-14 RX ADMIN — TAMSULOSIN HYDROCHLORIDE 0.4 MG: 0.4 CAPSULE ORAL at 08:51

## 2018-01-14 RX ADMIN — FENOFIBRATE 54 MG: 54 TABLET ORAL at 08:50

## 2018-01-14 RX ADMIN — MAGNESIUM OXIDE TAB 400 MG (241.3 MG ELEMENTAL MG) 400 MG: 400 (241.3 MG) TAB at 08:50

## 2018-01-14 RX ADMIN — POLYETHYLENE GLYCOL 3350 17 G: 17 POWDER, FOR SOLUTION ORAL at 08:50

## 2018-01-14 RX ADMIN — MIRTAZAPINE 45 MG: 45 TABLET, FILM COATED ORAL at 20:48

## 2018-01-14 RX ADMIN — PRAMIPEXOLE DIHYDROCHLORIDE 1.5 MG: 1.5 TABLET ORAL at 08:51

## 2018-01-14 RX ADMIN — GABAPENTIN 600 MG: 600 TABLET, FILM COATED ORAL at 20:49

## 2018-01-14 RX ADMIN — ASPIRIN 81 MG: 81 TABLET, COATED ORAL at 08:51

## 2018-01-14 RX ADMIN — ATORVASTATIN CALCIUM 20 MG: 10 TABLET, FILM COATED ORAL at 08:51

## 2018-01-14 RX ADMIN — ACETAMINOPHEN 650 MG: 325 TABLET, FILM COATED ORAL at 12:05

## 2018-01-14 NOTE — PROGRESS NOTES
[facility]  Hospitalist Progress Note          Assessment and Plan:    Mr. Shaikh is a 47-year-old gentleman who was admitted to the hospital for 7 days with chronic narcotic and benzodiazepine dependence and delirium due to polypharmacy who is being transferred to inpatient psychiatry as they pursue commitment for him.     # Anxiety disorder and ruling out mood disorder with psychotic features, care per Psychiatry.     #  Decreased TSH with a normal free T4.  Awaiting today's repeat labs    # Type 2 diabetes.  His last A1c done was on 01/06, it was 7.6%.  Will increase his Metformin slightly from 1000 mg and 500 mg in the evening to 1000 mg twice a day.       #  Gout.  He states he has not had a flare in over 10 years since he has been placed on allopurinol so this will be continued.     #  Hyperlipidemia.  Continue his Lipitor.     #  Delirium secondary to polypharmacy, this is resolved.     Hospitalist service can sign off if today's TSh labs are normal and don't require further testing.    Addendum: TSH low, Free T3 slightly low, Free T4 normal. ?central hypothyroidism. I see there's no Inpatient endocrinology consultation available here, so perhaps a phone call consultation can be made when weekend is over.    Review of labs from few years ago also shows low TSH               Interval History:   Frustrated that he didn't get much sleep last night                  Medications:       magnesium oxide   Oral Daily     metFORMIN  1,000 mg Oral BID w/meals     allopurinol  300 mg Oral Daily     aspirin EC EC tablet 81 mg  81 mg Oral Daily     atorvastatin (LIPITOR) tablet 20 mg  20 mg Oral Daily     cloNIDine (CATAPRES) tablet 0.1 mg  0.1 mg Oral BID     fenofibrate tablet 54 mg  54 mg Oral Daily     gabapentin (NEURONTIN) tablet 600 mg  600 mg Oral TID     mirtazapine  45 mg Oral At Bedtime     multivitamin, therapeutic with minerals  1 tablet Oral Daily     polyethylene glycol  17 g Oral Daily  "    pramipexole  1.5 mg Oral TID     propranolol (INDERAL) tablet 20 mg  20 mg Oral TID     QUEtiapine  300 mg Oral At Bedtime     tamsulosin  0.4 mg Oral Daily     vortioxetine  10 mg Oral Daily     QUEtiapine, acetaminophen, sennosides, albuterol, fexofenadine (ALLEGRA) tablet 180 mg               Physical Exam:   Blood pressure (!) 142/94, pulse 82, temperature 97.4  F (36.3  C), temperature source Oral, resp. rate 18, height 1.753 m (5' 9\"), weight 102 kg (224 lb 14.4 oz).  Temp:  [97.4  F (36.3  C)-98.1  F (36.7  C)] 97.4  F (36.3  C)  Pulse:  [79-90] 82  Resp:  [16-18] 18  BP: (101-142)/(63-94) 142/94    Wt Readings from Last 4 Encounters:   01/12/18 102 kg (224 lb 14.4 oz)   01/09/18 107 kg (235 lb 14.3 oz)   11/24/16 97.5 kg (215 lb)   08/20/16 99.8 kg (220 lb)       No intake or output data in the 24 hours ending 01/14/18 1001    Constitutional:   Appears well; in no apparent distress     Lungs:   Good air entry; Clear to auscultation     Cardiovascular:   S1, S2 heard; no S3 and no murmur     Abdomen:   Soft, nontender, no organomegaly, bowel sounds normal     Neurologic:   Alert and oriented X 3, Power 5/5; DTR intact; coordination intact                Data:   Lab Results   Component Value Date     01/06/2018     01/05/2018     08/20/2016    Lab Results   Component Value Date    CHLORIDE 103 01/06/2018    CHLORIDE 99 01/05/2018    CHLORIDE 106 08/20/2016    Lab Results   Component Value Date    BUN 11 01/06/2018    BUN 12 01/05/2018    BUN 9 08/20/2016      Lab Results   Component Value Date    POTASSIUM 3.5 01/06/2018    POTASSIUM 3.4 01/05/2018    POTASSIUM 4.1 08/20/2016    Lab Results   Component Value Date    CO2 29 01/06/2018    CO2 27 01/05/2018    CO2 30 08/20/2016    Lab Results   Component Value Date    CR 0.81 01/06/2018    CR 0.88 01/05/2018    CR 0.87 08/20/2016        Recent Labs   Lab Test  01/06/18   1053  01/05/18   1407  08/20/16   0338   WBC  8.5  10.3  8.8   HGB  " 14.5  15.6  14.1   HCT  41.1  43.0  39.8*   MCV  92  90  92   PLT  277  287  240     Recent Labs   Lab Test  01/06/18   1053  01/05/18   1407  08/20/16   0338   GLC  247*  224*  105*

## 2018-01-14 NOTE — PLAN OF CARE
Problem: Anxiety (Adult)  Goal: Identify Related Risk Factors and Signs and Symptoms  Related risk factors and signs and symptoms are identified upon initiation of Human Response Clinical Practice Guideline (CPG).   Pt was in bed for most of the shift. He complained of another pt being very loud and disruptive. He didn't sleep well at night, so he was trying to get some rest but the other pt was preventing that from happening. He was very nice and polite to staff and peers. Pt got out of bed at around 8:30 pm, to get snacks. He has been in the lounge watching TV and socializing since. He appears stressed and mildly irritable due to lack of sleep. Disheveled and untidy.

## 2018-01-14 NOTE — PLAN OF CARE
Problem: Anxiety (Adult)  Goal: Identify Related Risk Factors and Signs and Symptoms  Related risk factors and signs and symptoms are identified upon initiation of Human Response Clinical Practice Guideline (CPG).   Pt stated that he got very poor sleep last night d/t another pt being loud all night in the lounge area.  He c/o getting little sleep since coming to the hospital and wishes to speak to the dr about getting some other PRN medication to help with this. Pt remained mostly isolated to his room so he wouldn't have to see the other pt who was making noise. He hopes to be moved to SDU unit soon so he can hopefully work on getting better sleep.  Overall pt presents as tense and irritable.

## 2018-01-15 LAB
GLUCOSE BLDC GLUCOMTR-MCNC: 126 MG/DL (ref 70–99)
GLUCOSE BLDC GLUCOMTR-MCNC: 155 MG/DL (ref 70–99)
GLUCOSE BLDC GLUCOMTR-MCNC: 156 MG/DL (ref 70–99)

## 2018-01-15 PROCEDURE — 12400006 ZZH R&B MH INTERMEDIATE

## 2018-01-15 PROCEDURE — 90853 GROUP PSYCHOTHERAPY: CPT

## 2018-01-15 PROCEDURE — 25000125 ZZHC RX 250: Performed by: PSYCHIATRY & NEUROLOGY

## 2018-01-15 PROCEDURE — 25000132 ZZH RX MED GY IP 250 OP 250 PS 637: Performed by: PSYCHIATRY & NEUROLOGY

## 2018-01-15 PROCEDURE — 25000132 ZZH RX MED GY IP 250 OP 250 PS 637: Performed by: NURSE PRACTITIONER

## 2018-01-15 PROCEDURE — 99207 ZZC NON BILLABLE SERVICE FOR FALL: CPT | Performed by: NURSE PRACTITIONER

## 2018-01-15 PROCEDURE — 25000132 ZZH RX MED GY IP 250 OP 250 PS 637: Performed by: INTERNAL MEDICINE

## 2018-01-15 PROCEDURE — 99232 SBSQ HOSP IP/OBS MODERATE 35: CPT | Performed by: INTERNAL MEDICINE

## 2018-01-15 PROCEDURE — 90791 PSYCH DIAGNOSTIC EVALUATION: CPT

## 2018-01-15 PROCEDURE — 00000146 ZZHCL STATISTIC GLUCOSE BY METER IP

## 2018-01-15 RX ORDER — ACETAMINOPHEN 325 MG/1
975 TABLET ORAL EVERY 6 HOURS PRN
Status: DISCONTINUED | OUTPATIENT
Start: 2018-01-15 | End: 2018-01-19 | Stop reason: HOSPADM

## 2018-01-15 RX ORDER — IBUPROFEN 400 MG/1
400 TABLET, FILM COATED ORAL EVERY 6 HOURS PRN
Status: DISCONTINUED | OUTPATIENT
Start: 2018-01-15 | End: 2018-01-19 | Stop reason: HOSPADM

## 2018-01-15 RX ADMIN — GABAPENTIN 600 MG: 600 TABLET, FILM COATED ORAL at 08:31

## 2018-01-15 RX ADMIN — CLONIDINE HYDROCHLORIDE 0.1 MG: 0.1 TABLET ORAL at 08:31

## 2018-01-15 RX ADMIN — MAGNESIUM OXIDE TAB 400 MG (241.3 MG ELEMENTAL MG) 400 MG: 400 (241.3 MG) TAB at 08:31

## 2018-01-15 RX ADMIN — ASPIRIN 81 MG: 81 TABLET, COATED ORAL at 08:31

## 2018-01-15 RX ADMIN — GABAPENTIN 600 MG: 600 TABLET, FILM COATED ORAL at 21:06

## 2018-01-15 RX ADMIN — MULTIPLE VITAMINS W/ MINERALS TAB 1 TABLET: TAB at 08:32

## 2018-01-15 RX ADMIN — METFORMIN HYDROCHLORIDE 1000 MG: 500 TABLET, EXTENDED RELEASE ORAL at 08:31

## 2018-01-15 RX ADMIN — PRAMIPEXOLE DIHYDROCHLORIDE 1.5 MG: 1.5 TABLET ORAL at 08:31

## 2018-01-15 RX ADMIN — QUETIAPINE FUMARATE 50 MG: 50 TABLET, FILM COATED ORAL at 14:27

## 2018-01-15 RX ADMIN — GABAPENTIN 600 MG: 600 TABLET, FILM COATED ORAL at 15:43

## 2018-01-15 RX ADMIN — PROPRANOLOL HYDROCHLORIDE 20 MG: 10 TABLET ORAL at 21:07

## 2018-01-15 RX ADMIN — PROPRANOLOL HYDROCHLORIDE 20 MG: 10 TABLET ORAL at 08:31

## 2018-01-15 RX ADMIN — QUETIAPINE FUMARATE 300 MG: 200 TABLET, FILM COATED ORAL at 00:15

## 2018-01-15 RX ADMIN — CLONIDINE HYDROCHLORIDE 0.1 MG: 0.1 TABLET ORAL at 21:07

## 2018-01-15 RX ADMIN — QUETIAPINE FUMARATE 600 MG: 200 TABLET, FILM COATED ORAL at 21:06

## 2018-01-15 RX ADMIN — ATORVASTATIN CALCIUM 20 MG: 10 TABLET, FILM COATED ORAL at 08:31

## 2018-01-15 RX ADMIN — IBUPROFEN 400 MG: 400 TABLET ORAL at 13:28

## 2018-01-15 RX ADMIN — ACETAMINOPHEN 975 MG: 325 TABLET, FILM COATED ORAL at 09:36

## 2018-01-15 RX ADMIN — PRAMIPEXOLE DIHYDROCHLORIDE 1.5 MG: 1.5 TABLET ORAL at 15:43

## 2018-01-15 RX ADMIN — ONDANSETRON 4 MG: 4 TABLET, FILM COATED ORAL at 10:05

## 2018-01-15 RX ADMIN — POLYETHYLENE GLYCOL 3350 17 G: 17 POWDER, FOR SOLUTION ORAL at 08:39

## 2018-01-15 RX ADMIN — TAMSULOSIN HYDROCHLORIDE 0.4 MG: 0.4 CAPSULE ORAL at 08:31

## 2018-01-15 RX ADMIN — ACETAMINOPHEN 975 MG: 325 TABLET, FILM COATED ORAL at 15:43

## 2018-01-15 RX ADMIN — VORTIOXETINE 10 MG: 10 TABLET, FILM COATED ORAL at 08:31

## 2018-01-15 RX ADMIN — MIRTAZAPINE 45 MG: 45 TABLET, FILM COATED ORAL at 21:07

## 2018-01-15 RX ADMIN — METFORMIN HYDROCHLORIDE 1000 MG: 500 TABLET, EXTENDED RELEASE ORAL at 17:44

## 2018-01-15 RX ADMIN — PRAMIPEXOLE DIHYDROCHLORIDE 1.5 MG: 1.5 TABLET ORAL at 21:07

## 2018-01-15 RX ADMIN — FENOFIBRATE 54 MG: 54 TABLET ORAL at 08:31

## 2018-01-15 RX ADMIN — PROPRANOLOL HYDROCHLORIDE 20 MG: 10 TABLET ORAL at 15:43

## 2018-01-15 RX ADMIN — ALLOPURINOL 300 MG: 300 TABLET ORAL at 08:32

## 2018-01-15 ASSESSMENT — ACTIVITIES OF DAILY LIVING (ADL)
DRESS: SCRUBS (BEHAVIORAL HEALTH);INDEPENDENT
HYGIENE/GROOMING: INDEPENDENT
ORAL_HYGIENE: INDEPENDENT

## 2018-01-15 NOTE — H&P
Social History    Reason for Admission:  Patient is 47 year old male admitted to [facility]'s Inpatient Mental Health Unit on 2018.  Patient stated he has insomnia, got sick and became very depressed, which is increased his chronic pain and anxiety.  He stated prior to admission he took more medication than he should have, pain medication and Klonopin.  He stated he slept it off but his mom was worried about him and called the police.  He stated he was woozy and dehydrated.  He stated he did not want to come to the hospital but the police insisted.  He was placed on a hold and was on a medical floor prior to admission to this unit.   Patient was lying in bed during the interview.  He maintained good eye contact but was squeezing his eyes shut constantly.      Previous Mental & Chemical Dependency History:  Patient stated this is his second mental health hospitalization.  The first was at this facility in .  Patient had chemical dependency treatment for alcohol at [facility] in .  He has been sober from alcohol since .  He stated he does take more medication than prescribed at times but knows when he has used the medication up for the month that is it.  He goes to the [facility] in [place] for chronic pain.  Patient has a therapist, Lorri Martinez at [facility] in [place].  He stated he doesn't drink caffeine often, doesn't ohara or smoke.      Social History:  Patient was born in this hospital and grew up in [place].  His parents  when patient was 16 years old.  His mother lives here and his father lives in [place].  He has one brother.  He denies any family history of mental illness or chemical dependency.  He stated is family is supportive.  Patient is a  after 6 months of marriage.  His wife  in a car accident.  Patient is not in a relationship and has no children.      Education and Work History:  Patient  graduated from high school and has two years of college at [employer].  He has worked since age of 14.  He was worked for [employer] for 24 years for 3 1/2 months at a time.  He also has done warehouse work, cooking and cleaning.      Living Situation:  Patient lives alone in an apartment in [place].  He lives near his mother so he can help her.      Legal Issues:  Petition for Commitment in [facility] for Chemical Dependency.      Significant Life Events: His wife dying after 6 months of marriage.       Temple:  Voodoo.       History:  None.    Discharge Considerations:  Patient's support system includes his family and therapist.  He does not have a psychiatrist and is reluctant to consider chemical dependency treatment.  Social Service will remain available to assist with commitment process and appropriate discharge planning.

## 2018-01-15 NOTE — PROGRESS NOTES
[facility]    Hospitalist Progress Note    Interval History    - Patient without issues today, seems appropriate, was able to discuss his history of anxiety, insomnia, previous cortisol levels  - Patient denies diarrhea, hyperthermia, tachycardia, weight loss, goiter, or other symptoms of hyperthyroidism  - Hospitalist service will sign off today.    Assessment & Plan   Summary: Dhruv Shaikh is a 47-year-old gentleman who was admitted to the hospital for 7 days with chronic narcotic and benzodiazepine dependence and delirium due to polypharmacy who was transferred to inpatient psych on 1/12 for commitment. Hospitalist service is consulted for medical management.    Subclinical hyperthyroidism: Patient noted to have decreased TSH, normal FT4, slightly low FT3. He does not have any hyperthyroid symptoms, and does not appear to have hypothyroid-type symptoms either. The cause for this could be any number of things from a goiter/adenoma, thyroiditis, and the differential includes central hypothyroidism. However, as he is asymptomatic and young, I do not recommend further testing currently, as there is no benefit to any treatment.  - Can have outpatient thyroid ultrasound  - Repeat TSH, FT4, FT3 in 3 months with PCP  - If patient has tachycardia or palpitations would check EKG, subclinical hyperthyroidism is associated with atrial fibrillation, and this would necessitate treatment    DM2: A1c 7.6% on 1/6. On metformin 1000mg BID.  - Recheck A1c in 3 months.  - Recommend weight loss, exercise, lifestyle changes  - Certain antipsychotics such as Seroquel cause weight gain and worsening glucose impairment and alternatives should be considered    Gout: Allopurinol  HLD: Lipitor    DVT Prophylaxis: Low Risk/Ambulatory with no VTE prophylaxis indicated  Code Status: Full Code    Wood Chowdhury MD  Text Page  (7am to 6pm)  -Data reviewed today: I reviewed all new labs and imaging results over the last 24  hours.     Physical Exam   Temp: 98.1  F (36.7  C) Temp src: Oral BP: 124/77 Pulse: 95 Heart Rate: 75 Resp: 16        Vitals:    01/12/18 1624   Weight: 102 kg (224 lb 14.4 oz)     Vital Signs with Ranges  Temp:  [97.9  F (36.6  C)-98.1  F (36.7  C)] 98.1  F (36.7  C)  Pulse:  [75-95] 95  Heart Rate:  [75] 75  Resp:  [16] 16  BP: (115-124)/(69-81) 124/77       Constitutional: Male in NAD  HEENT: Normocephalic, atraumatic, eyes nonicteric, oral mucosa moist  Cardiovascular: RRR, normal S1/2, no m/r/g  Respiratory: CTAB, no wheezing or crackles  Vascular: No LE pitting edema, noted distal pedal pulses  GI: No organomegaly, normoactive bowel sounds, nontender, nondistended  Skin/Integumen: No rashes or scars  Neuro/Psych: Appropriate affect and mood. A&Ox3, moves all extremities    Medications        QUEtiapine  600 mg Oral At Bedtime     magnesium oxide   Oral Daily     metFORMIN  1,000 mg Oral BID w/meals     allopurinol  300 mg Oral Daily     aspirin EC EC tablet 81 mg  81 mg Oral Daily     atorvastatin (LIPITOR) tablet 20 mg  20 mg Oral Daily     cloNIDine (CATAPRES) tablet 0.1 mg  0.1 mg Oral BID     fenofibrate tablet 54 mg  54 mg Oral Daily     gabapentin (NEURONTIN) tablet 600 mg  600 mg Oral TID     mirtazapine  45 mg Oral At Bedtime     multivitamin, therapeutic with minerals  1 tablet Oral Daily     polyethylene glycol  17 g Oral Daily     pramipexole  1.5 mg Oral TID     propranolol (INDERAL) tablet 20 mg  20 mg Oral TID     tamsulosin  0.4 mg Oral Daily     vortioxetine  10 mg Oral Daily       Data   No lab results found in last 7 days.    Imaging:   No results found for this or any previous visit (from the past 24 hour(s)).

## 2018-01-15 NOTE — PROGRESS NOTES
"[facility] Psychiatric Progress Note    Interval History:   Pt seen, chart reviewed, case discussed with nursing staff. Tolerating medications without side effects. Side effects, risks, and benefits of medications reviewed with patient. He states that his medications have been beneficial. However, he states that he wakes up in the middle of the night and would like to have a higher dose of Seroquel. He reports he went to bed at 2100 and woke up at 0400 due to another peer on the unit. Plan to increase Seroquel to 600mg qhs. He continues to complain of restless legs, but is already on Mirapex 1.5mg tid. Court hearing and assessment is tomorrow and remains on an court hold. Pt continues to have poor insight into his situation, believed that he can leave the hospital if \"I win in court.\"     Review of systems:   10 point Review of Systems completed by Jr Rodriguez MD, is negative other than noted in the HPI     Medications:       magnesium oxide   Oral Daily     metFORMIN  1,000 mg Oral BID w/meals     allopurinol  300 mg Oral Daily     aspirin EC EC tablet 81 mg  81 mg Oral Daily     atorvastatin (LIPITOR) tablet 20 mg  20 mg Oral Daily     cloNIDine (CATAPRES) tablet 0.1 mg  0.1 mg Oral BID     fenofibrate tablet 54 mg  54 mg Oral Daily     gabapentin (NEURONTIN) tablet 600 mg  600 mg Oral TID     mirtazapine  45 mg Oral At Bedtime     multivitamin, therapeutic with minerals  1 tablet Oral Daily     polyethylene glycol  17 g Oral Daily     pramipexole  1.5 mg Oral TID     propranolol (INDERAL) tablet 20 mg  20 mg Oral TID     QUEtiapine  300 mg Oral At Bedtime     tamsulosin  0.4 mg Oral Daily     vortioxetine  10 mg Oral Daily     ondansetron, QUEtiapine, acetaminophen, sennosides, albuterol, fexofenadine (ALLEGRA) tablet 180 mg    Mental Status Examination:     Appearance:  appeared older than stated age, poorly groomed and fatigued, overly familiar  Eye Contact:  intense, averts eye " contact, repeatedly blinking  Speech:  clear, coherent and normal prosody, very irritable tone  Language: Normal  Psychomotor Behavior:  evidence of tics  Mood:  anxious and depressed,   Affect:  intensity is exaggerated, intensity is heightened and irritable,   Thought Process:  logical, linear and goal oriented no loose associations, perseverates about anxiety, poor sleep, restless legs.   Thought Content:  no evidence of suicidal ideation or homicidal ideation  Oriented to:  time, person, and place  Attention Span and Concentration:  limited  Recent and Remote Memory:  limited  Fund of Knowledge: Poor  Muscle Strength and Tone: normal  Gait and Station: Normal  Insight:  limited  Judgment:  poor        Labs/Vitals:     Recent Results (from the past 24 hour(s))   T4 free    Collection Time: 01/14/18  9:20 AM   Result Value Ref Range    T4 Free 0.82 0.76 - 1.46 ng/dL   TSH    Collection Time: 01/14/18  9:20 AM   Result Value Ref Range    TSH 0.07 (L) 0.40 - 4.00 mU/L   T3 Free    Collection Time: 01/14/18  9:20 AM   Result Value Ref Range    Free T3 2.1 (L) 2.3 - 4.2 pg/mL   Glucose by meter    Collection Time: 01/14/18  5:17 PM   Result Value Ref Range    Glucose 135 (H) 70 - 99 mg/dL     B/P: 114/72, T: 98.1, P: 73, R: 14    Impression:   Dhruv continues to display very poor insight. We will make some adjustments in his Seroquel to help sleep.  I think we need to keep an eye on his level of irritability particularly in light of the fact that he is also on Mirapex, which is a dopamine agonist.  He looks depressed but also seems to have a paranoid flavor. Phenobarbital taper has completed.    DIagnoses:   1.  Delirium secondary to polypharmacy, resolved  2.  Opioid use disorder  3.  Sedative/Hypnotic use disorder  4.  Anxiety disorder, unspecified  5.  Rule out mood disorder with psychotic features  6.  Rule out intellectual disability  7.  RLS         Plan:   1. Written information given on medications. Side  effects, risks, benefits reviewed.  2. Continue hospitalization.  3. Increase Seroquel to 600mg qhs.  4. Examination tomorrow.    Attestation:  Patient has been seen and evaluated by me,  Jr Rodriguez MD

## 2018-01-15 NOTE — PROGRESS NOTES
Around 0915, the patient reported that he fell. He then showed staff a chair with the legs bowed. The chair was disposed of. House doctor came to check him out. He reported that he did not hit his head and does not report any pain other then a mild head ache.

## 2018-01-15 NOTE — PROGRESS NOTES
"Pt was calm and cooperative throughout the shift. Presents as blunt and flat. Pt had a fall this morning due to a chair that had one leg that was warped. The fall was unwitnessed by staff and the patient reported the fall to staff himself. The patient stated he was \"fine\" and did not hit his head but was seen and cleared by the house doctor. Pt showered and attended all groups and participated appropriately. Pt made phone calls to his  and prepared for his petition hearing tomorrow afternoon. Pt seems a little tense, but was calm all afternoon. Med compliant.  "

## 2018-01-15 NOTE — PLAN OF CARE
Problem: Anxiety (Adult)  Goal: Identify Related Risk Factors and Signs and Symptoms  Related risk factors and signs and symptoms are identified upon initiation of Human Response Clinical Practice Guideline (CPG).   Pt politely c/o other pt being too loud all night and day. He came out to the lounge for a few hours to watch the game and socialized with peers. Raised his voice at the other pt when he got loud. Pt was redirectable and calmed down. Intermittently took naps. Disheveled and untidy. Would like to move to a private SDU room when possible.

## 2018-01-15 NOTE — PROGRESS NOTES
[facility]    House Officer Response Note- Fall  1/15/2018   Time Called: 8: 47 AM    Assessment & Plan   Fall from chair, no obvious injury, mechanical fall  Patient was sitting on a plastic chair and fell backward. By other patient report leg of chair was bent, which caused fall. Patient remembers fall, denies hitting head. No midline spinal tenderness. No focal neurologic deficits.     INTERVENTIONS:  - Tylenol or ibuprofen for discomfort  - no imaging at this time, please call if he develops pain or other conerns    Code Status: Full Code    Ml Arce, APRN, CNP  Hospitalist Service, House Officer  [facility]     Text Page  Pager: [phone]    Allergies   Allergies   Allergen Reactions     Ace Inhibitors      Dust Mites      Mold      Pollen Extract      Weed [Grass]        Physical Exam   Vital Signs with Ranges:  Temp:  [97.9  F (36.6  C)-98.1  F (36.7  C)] 98.1  F (36.7  C)  Pulse:  [75-95] 95  Heart Rate:  [75] 75  Resp:  [16] 16  BP: (115-124)/(69-81) 124/77     Constitutional: 47- year old male in no acute distress.   Pulmonary: No apparent acute distress.   Cardiovascular: Appears well-perfused.  Skin/Integumen: Skin intact. No obvious contusions.  Neuro: No focal deficits.    Troponin:    Recent Labs   Lab Test  01/05/18   1407   TROPI  <0.015     CBC with Diff:  Recent Labs   Lab Test  01/06/18   1053   11/15/15   2013   WBC  8.5   < >  9.7   HGB  14.5   < >  14.0   MCV  92   < >  90   PLT  277   < >  201   INR   --    --   0.98    < > = values in this interval not displayed.        Lactic Acid:    Lab Results   Component Value Date    LACT 1.7 06/28/2016         INR:    Recent Labs   Lab Test  11/15/15   2013   INR  0.98       Time Spent on this Encounter   I spent 30 minutes on the unit/floor managing the care of Dhruv Shaikh. Over 50% of my time was spent counseling the patient and/or coordinating care regarding services listed in this note.

## 2018-01-16 LAB — GLUCOSE BLDC GLUCOMTR-MCNC: 123 MG/DL (ref 70–99)

## 2018-01-16 PROCEDURE — 12400006 ZZH R&B MH INTERMEDIATE

## 2018-01-16 PROCEDURE — 25000132 ZZH RX MED GY IP 250 OP 250 PS 637: Performed by: NURSE PRACTITIONER

## 2018-01-16 PROCEDURE — 00000146 ZZHCL STATISTIC GLUCOSE BY METER IP

## 2018-01-16 PROCEDURE — 25000132 ZZH RX MED GY IP 250 OP 250 PS 637: Performed by: PSYCHIATRY & NEUROLOGY

## 2018-01-16 PROCEDURE — 25000125 ZZHC RX 250: Performed by: PSYCHIATRY & NEUROLOGY

## 2018-01-16 PROCEDURE — 90853 GROUP PSYCHOTHERAPY: CPT

## 2018-01-16 PROCEDURE — 25000132 ZZH RX MED GY IP 250 OP 250 PS 637: Performed by: INTERNAL MEDICINE

## 2018-01-16 RX ORDER — MAGNESIUM CARB/ALUMINUM HYDROX 105-160MG
296 TABLET,CHEWABLE ORAL ONCE
Status: COMPLETED | OUTPATIENT
Start: 2018-01-16 | End: 2018-01-16

## 2018-01-16 RX ADMIN — CLONIDINE HYDROCHLORIDE 0.1 MG: 0.1 TABLET ORAL at 21:27

## 2018-01-16 RX ADMIN — GABAPENTIN 600 MG: 600 TABLET, FILM COATED ORAL at 09:04

## 2018-01-16 RX ADMIN — METFORMIN HYDROCHLORIDE 1000 MG: 500 TABLET, EXTENDED RELEASE ORAL at 09:05

## 2018-01-16 RX ADMIN — POLYETHYLENE GLYCOL 3350 17 G: 17 POWDER, FOR SOLUTION ORAL at 09:04

## 2018-01-16 RX ADMIN — PRAMIPEXOLE DIHYDROCHLORIDE 1.5 MG: 1.5 TABLET ORAL at 09:05

## 2018-01-16 RX ADMIN — QUETIAPINE FUMARATE 100 MG: 50 TABLET, FILM COATED ORAL at 15:25

## 2018-01-16 RX ADMIN — PROPRANOLOL HYDROCHLORIDE 20 MG: 10 TABLET ORAL at 09:04

## 2018-01-16 RX ADMIN — ONDANSETRON 4 MG: 4 TABLET, FILM COATED ORAL at 12:44

## 2018-01-16 RX ADMIN — CLONIDINE HYDROCHLORIDE 0.1 MG: 0.1 TABLET ORAL at 09:05

## 2018-01-16 RX ADMIN — ALLOPURINOL 300 MG: 300 TABLET ORAL at 09:05

## 2018-01-16 RX ADMIN — PRAMIPEXOLE DIHYDROCHLORIDE 1.5 MG: 1.5 TABLET ORAL at 16:20

## 2018-01-16 RX ADMIN — MULTIPLE VITAMINS W/ MINERALS TAB 1 TABLET: TAB at 09:05

## 2018-01-16 RX ADMIN — MAGNESIUM OXIDE TAB 400 MG (241.3 MG ELEMENTAL MG) 400 MG: 400 (241.3 MG) TAB at 09:05

## 2018-01-16 RX ADMIN — MIRTAZAPINE 45 MG: 45 TABLET, FILM COATED ORAL at 21:27

## 2018-01-16 RX ADMIN — GABAPENTIN 600 MG: 600 TABLET, FILM COATED ORAL at 16:20

## 2018-01-16 RX ADMIN — GABAPENTIN 600 MG: 600 TABLET, FILM COATED ORAL at 21:27

## 2018-01-16 RX ADMIN — MAGESIUM CITRATE 296 ML: 1.75 LIQUID ORAL at 17:40

## 2018-01-16 RX ADMIN — PRAMIPEXOLE DIHYDROCHLORIDE 1.5 MG: 1.5 TABLET ORAL at 21:27

## 2018-01-16 RX ADMIN — ACETAMINOPHEN 975 MG: 325 TABLET, FILM COATED ORAL at 18:44

## 2018-01-16 RX ADMIN — METFORMIN HYDROCHLORIDE 1000 MG: 500 TABLET, EXTENDED RELEASE ORAL at 17:24

## 2018-01-16 RX ADMIN — PROPRANOLOL HYDROCHLORIDE 20 MG: 10 TABLET ORAL at 21:25

## 2018-01-16 RX ADMIN — SENNOSIDES 8.6 MG: 8.6 TABLET, FILM COATED ORAL at 10:53

## 2018-01-16 RX ADMIN — ATORVASTATIN CALCIUM 20 MG: 10 TABLET, FILM COATED ORAL at 09:05

## 2018-01-16 RX ADMIN — QUETIAPINE FUMARATE 100 MG: 50 TABLET, FILM COATED ORAL at 00:03

## 2018-01-16 RX ADMIN — ASPIRIN 81 MG: 81 TABLET, COATED ORAL at 09:04

## 2018-01-16 RX ADMIN — TAMSULOSIN HYDROCHLORIDE 0.4 MG: 0.4 CAPSULE ORAL at 09:05

## 2018-01-16 RX ADMIN — QUETIAPINE FUMARATE 300 MG: 200 TABLET, FILM COATED ORAL at 21:27

## 2018-01-16 RX ADMIN — ONDANSETRON 4 MG: 4 TABLET, FILM COATED ORAL at 21:27

## 2018-01-16 RX ADMIN — VORTIOXETINE 10 MG: 10 TABLET, FILM COATED ORAL at 10:13

## 2018-01-16 RX ADMIN — PROPRANOLOL HYDROCHLORIDE 20 MG: 10 TABLET ORAL at 16:20

## 2018-01-16 RX ADMIN — FENOFIBRATE 54 MG: 54 TABLET ORAL at 09:05

## 2018-01-16 RX ADMIN — VORTIOXETINE 10 MG: 10 TABLET, FILM COATED ORAL at 09:04

## 2018-01-16 ASSESSMENT — ACTIVITIES OF DAILY LIVING (ADL)
GROOMING: INDEPENDENT
GROOMING: INDEPENDENT;SHOWER
LAUNDRY: WITH SUPERVISION
DRESS: STREET CLOTHES;INDEPENDENT
DRESS: SCRUBS (BEHAVIORAL HEALTH)
ORAL_HYGIENE: INDEPENDENT
ORAL_HYGIENE: INDEPENDENT

## 2018-01-16 NOTE — PROGRESS NOTES
received a call this afternoon from Max Galloway with the [facility] Attorney's Office ([phone]). She was inquiring about physician recommendations in patient's case. Physician progress note from today was reviewed with her, including physician's recommendation for stay of commitment. She indicated frustration regarding patient's case. Because of communication issues related to patient transferring from a medical unit to [facility], the 's Office was unable to  patient today for court. They apparently arrived at the medical unit and were informed that patient was no longer hospitalized. She reflected that patient has concerns about his job if he continues to be hospitalized.  reviewed records since admission on [facility] [address] and no mention is made of this. She was encouraged to speak directly with physician regarding specific treatment recommendations and physician number given to her.      received a call from Ricky Pitts, [facility]  ([phone]), regarding patient's case. He stated that they were able to finish up the hearing. Patient was able to give testimony by phone. The  has taken the case under advisement. Patient has a pre-trial exam on Friday 01/19/18 at 1400 at [facility]. Trial is scheduled for 1430 that day.  clarified with  that patient is currently a patient on [facility] [address] at [facility]. He stated that he will contact [place] and [address] about having someone come to the hospital to do a Rule 25 Chemical Dependency Assessment on patient prior to Friday's court date. He will contact  once this has been arranged.      called and spoke with patient's physician to notify him of court's decision to take the case under advisement.

## 2018-01-16 NOTE — PROGRESS NOTES
"[facility] Psychiatric Progress Note    Interval History:   Pt seen, chart reviewed, case discussed with nursing staff. Tolerating medications without side effects. Side effects, risks, and benefits of medications reviewed with patient. Pt suffered a mechanical fall yesterday due to a chair with bowed legs. He was seen by the house physician and did not have any obvious injury. He is concerned about his court date this morning. He continues to believe that he only \"took a few pills\" and does not have an issue with benzodiazepines. Encouraged pt to support a stay of commitment. He stated that Seroquel is 600mg was too sedating and wished to take 300mg at night and wake up in the middle of the night to take another dose. Seroquel will be decreased back to 300mg x 1 repeat and Trintellix will be increased to 20mg qam.     Review of systems:   10 point Review of Systems completed by Jr Rodriguez MD, is negative other than noted in the HPI     Medications:       QUEtiapine  600 mg Oral At Bedtime     magnesium oxide   Oral Daily     metFORMIN  1,000 mg Oral BID w/meals     allopurinol  300 mg Oral Daily     aspirin EC EC tablet 81 mg  81 mg Oral Daily     atorvastatin (LIPITOR) tablet 20 mg  20 mg Oral Daily     cloNIDine (CATAPRES) tablet 0.1 mg  0.1 mg Oral BID     fenofibrate tablet 54 mg  54 mg Oral Daily     gabapentin (NEURONTIN) tablet 600 mg  600 mg Oral TID     mirtazapine  45 mg Oral At Bedtime     multivitamin, therapeutic with minerals  1 tablet Oral Daily     polyethylene glycol  17 g Oral Daily     pramipexole  1.5 mg Oral TID     propranolol (INDERAL) tablet 20 mg  20 mg Oral TID     tamsulosin  0.4 mg Oral Daily     vortioxetine  10 mg Oral Daily     acetaminophen, ibuprofen, ondansetron, QUEtiapine, sennosides, albuterol, fexofenadine (ALLEGRA) tablet 180 mg    Mental Status Examination:     Appearance:  appeared older than stated age, poorly groomed and fatigued, overly " familiar  Eye Contact:  intense, averts eye contact, repeatedly blinking  Speech:  clear, coherent and normal prosody, somewhat irritable  Language: Normal  Psychomotor Behavior:  evidence of tics  Mood: remains anxious and depressed,   Affect:  intensity is exaggerated, intensity is heightened and irritable,   Thought Process:  logical, linear and goal oriented no loose associations, perseverates about anxiety, poor sleep, restless legs.   Thought Content:  no evidence of suicidal ideation or homicidal ideation  Oriented to:  time, person, and place  Attention Span and Concentration:  limited  Recent and Remote Memory:  limited  Fund of Knowledge: Poor  Muscle Strength and Tone: normal  Gait and Station: Normal  Insight:  limited  Judgment:  poor        Labs/Vitals:     Recent Results (from the past 24 hour(s))   Glucose by meter    Collection Time: 01/15/18 12:31 PM   Result Value Ref Range    Glucose 155 (H) 70 - 99 mg/dL   Glucose by meter    Collection Time: 01/15/18  5:42 PM   Result Value Ref Range    Glucose 126 (H) 70 - 99 mg/dL     B/P: 114/72, T: 98.1, P: 73, R: 14    Impression:   Dhruv continues to display very poor insight. We will make some adjustments in his Seroquel to help sleep.  I think we need to keep an eye on his level of irritability particularly in light of the fact that he is also on Mirapex, which is a dopamine agonist.  He looks depressed but also seems to have a paranoid flavor. Phenobarbital taper has completed.    DIagnoses:   1.  Delirium secondary to polypharmacy, resolved  2.  Opioid use disorder  3.  Sedative/Hypnotic use disorder  4.  Anxiety disorder, unspecified  5.  Rule out mood disorder with psychotic features  6.  Rule out intellectual disability  7.  RLS         Plan:   1. Written information given on medications. Side effects, risks, benefits reviewed.  2. Continue hospitalization.  3. Increase Trintellix to 20mg qam.  4. Decrease Seroquel to 300mg qhs x 1 repeat.  5.  Examination today.    Attestation:  Patient has been seen and evaluated by me,  Jr Rodriguez MD

## 2018-01-16 NOTE — PLAN OF CARE
Problem: Anxiety (Adult)  Goal: Identify Related Risk Factors and Signs and Symptoms  Related risk factors and signs and symptoms are identified upon initiation of Human Response Clinical Practice Guideline (CPG).   Outcome: Improving  Patient presents with a more full range affect. He was visible and social on the unit. Patient attended groups and participated appropriately. Patient expressed feeling quite anxious about court appointment, but stated he has been able to practice better coping skills. Patient is hoping to be able to do a stay of commitment. He has been cooperative with medications and pleasant with staff.

## 2018-01-16 NOTE — PLAN OF CARE
Problem: Anxiety (Adult)  Goal: Identify Related Risk Factors and Signs and Symptoms  Related risk factors and signs and symptoms are identified upon initiation of Human Response Clinical Practice Guideline (CPG).   Outcome: No Change  Patient presents as blunt/flat with calm mood. Transferred to SDU this shift and adjusted well. Spent most of shift in room. Mom visited this shift and brought some tax/insurance paperwork. Patient is concerned bc he doesn't think he has insurance. Would like to speak with business office tomorrow. Nervous about his court hearing. Provided encouragement to various peers about getting through difficult times/attending treatment. BGM check at dinner was 126. Had some concerns that his bedtime dose of seroquel was too high (600 mg) and would make him sick. Thinks 300 mg would be better. Attended only wrap up group and participated appropriately.

## 2018-01-17 LAB
GLUCOSE BLDC GLUCOMTR-MCNC: 111 MG/DL (ref 70–99)
GLUCOSE BLDC GLUCOMTR-MCNC: 140 MG/DL (ref 70–99)

## 2018-01-17 PROCEDURE — 25000132 ZZH RX MED GY IP 250 OP 250 PS 637: Performed by: PSYCHIATRY & NEUROLOGY

## 2018-01-17 PROCEDURE — 12400006 ZZH R&B MH INTERMEDIATE

## 2018-01-17 PROCEDURE — 00000146 ZZHCL STATISTIC GLUCOSE BY METER IP

## 2018-01-17 PROCEDURE — 90853 GROUP PSYCHOTHERAPY: CPT

## 2018-01-17 PROCEDURE — 25000132 ZZH RX MED GY IP 250 OP 250 PS 637: Performed by: INTERNAL MEDICINE

## 2018-01-17 RX ADMIN — ASPIRIN 81 MG: 81 TABLET, COATED ORAL at 08:48

## 2018-01-17 RX ADMIN — MAGNESIUM OXIDE TAB 400 MG (241.3 MG ELEMENTAL MG) 400 MG: 400 (241.3 MG) TAB at 08:48

## 2018-01-17 RX ADMIN — ATORVASTATIN CALCIUM 20 MG: 10 TABLET, FILM COATED ORAL at 08:48

## 2018-01-17 RX ADMIN — PRAMIPEXOLE DIHYDROCHLORIDE 1.5 MG: 1.5 TABLET ORAL at 08:48

## 2018-01-17 RX ADMIN — QUETIAPINE FUMARATE 100 MG: 50 TABLET, FILM COATED ORAL at 12:36

## 2018-01-17 RX ADMIN — GABAPENTIN 600 MG: 600 TABLET, FILM COATED ORAL at 21:30

## 2018-01-17 RX ADMIN — FENOFIBRATE 54 MG: 54 TABLET ORAL at 08:48

## 2018-01-17 RX ADMIN — TAMSULOSIN HYDROCHLORIDE 0.4 MG: 0.4 CAPSULE ORAL at 08:48

## 2018-01-17 RX ADMIN — GABAPENTIN 600 MG: 600 TABLET, FILM COATED ORAL at 17:12

## 2018-01-17 RX ADMIN — PROPRANOLOL HYDROCHLORIDE 20 MG: 10 TABLET ORAL at 08:48

## 2018-01-17 RX ADMIN — ALLOPURINOL 300 MG: 300 TABLET ORAL at 08:48

## 2018-01-17 RX ADMIN — PROPRANOLOL HYDROCHLORIDE 20 MG: 10 TABLET ORAL at 17:12

## 2018-01-17 RX ADMIN — METFORMIN HYDROCHLORIDE 1000 MG: 500 TABLET, EXTENDED RELEASE ORAL at 17:12

## 2018-01-17 RX ADMIN — PRAMIPEXOLE DIHYDROCHLORIDE 1.5 MG: 1.5 TABLET ORAL at 17:12

## 2018-01-17 RX ADMIN — METFORMIN HYDROCHLORIDE 1000 MG: 500 TABLET, EXTENDED RELEASE ORAL at 08:48

## 2018-01-17 RX ADMIN — GABAPENTIN 600 MG: 600 TABLET, FILM COATED ORAL at 08:48

## 2018-01-17 RX ADMIN — PROPRANOLOL HYDROCHLORIDE 20 MG: 10 TABLET ORAL at 21:30

## 2018-01-17 RX ADMIN — POLYETHYLENE GLYCOL 3350 17 G: 17 POWDER, FOR SOLUTION ORAL at 08:47

## 2018-01-17 RX ADMIN — QUETIAPINE FUMARATE 300 MG: 200 TABLET, FILM COATED ORAL at 21:30

## 2018-01-17 RX ADMIN — VORTIOXETINE 20 MG: 20 TABLET, FILM COATED ORAL at 08:48

## 2018-01-17 RX ADMIN — PRAMIPEXOLE DIHYDROCHLORIDE 1.5 MG: 1.5 TABLET ORAL at 21:30

## 2018-01-17 RX ADMIN — CLONIDINE HYDROCHLORIDE 0.1 MG: 0.1 TABLET ORAL at 21:30

## 2018-01-17 RX ADMIN — CLONIDINE HYDROCHLORIDE 0.1 MG: 0.1 TABLET ORAL at 08:48

## 2018-01-17 RX ADMIN — QUETIAPINE FUMARATE 300 MG: 200 TABLET, FILM COATED ORAL at 00:57

## 2018-01-17 RX ADMIN — MULTIPLE VITAMINS W/ MINERALS TAB 1 TABLET: TAB at 08:48

## 2018-01-17 RX ADMIN — MIRTAZAPINE 45 MG: 45 TABLET, FILM COATED ORAL at 21:30

## 2018-01-17 ASSESSMENT — ACTIVITIES OF DAILY LIVING (ADL)
LAUNDRY: WITH SUPERVISION
GROOMING: INDEPENDENT
ORAL_HYGIENE: INDEPENDENT
DRESS: INDEPENDENT

## 2018-01-17 NOTE — PROGRESS NOTES
Lo at [facility] called regarding patient's trial set for Friday 1/19/18 @ 2:30 pm.  to be available to give testimony as to team's recommendations via telephone. She requests confirmation who will be contact. Call @ [phone].

## 2018-01-17 NOTE — PLAN OF CARE
"Problem: Anxiety (Adult)  Goal: Identify Related Risk Factors and Signs and Symptoms  Related risk factors and signs and symptoms are identified upon initiation of Human Response Clinical Practice Guideline (CPG).   Outcome: No Change  Pt has been present in the lounge and turner of the SDU throughout the day shift. Presents a flat, depressed, tense, anxious affect; remains calm and is respectful to peers and staff. Ate most of his meals for breakfast and lunch. Attended groups and participated. Pt stated that he, \"Does not have any coping skills; just drugs.\"      "

## 2018-01-17 NOTE — PROGRESS NOTES
[facility] Psychiatric Progress Note    Interval History:   Pt seen, chart reviewed, case discussed with nursing staff. Tolerating medications without side effects. Side effects, risks, and benefits of medications reviewed with patient. He reports that Seroquel 300mg has been effective and is feeling less depressed on Trintellix. He has not been experiencing restless legs with Mirapex. Pt did not go to his hearing yesterday as there was miscommunication between the  and the hospital staff regarding pt's whereabouts. He had a phone interview later in the afternoon and the  took the case under advisement. He has a pre-trial hearing set for Friday 1/19/18 at 1430. Dr. Rodriguez suggested that pt attend the evening MI/CD program at [facility] in order to gain insight and strategies to cope with substance use within the context of pt's mental illness. Pt acknowledged.  Plan to contact the  to review treatment recommendations and that pt does not require another hearing as he is amenable to treatment.      Review of systems:   10 point Review of Systems completed by Jr Rodriguez MD, is negative other than noted in the HPI     Medications:       vortioxetine  20 mg Oral Daily     QUEtiapine  300 mg Oral At Bedtime     magnesium oxide   Oral Daily     metFORMIN  1,000 mg Oral BID w/meals     allopurinol  300 mg Oral Daily     aspirin EC EC tablet 81 mg  81 mg Oral Daily     atorvastatin (LIPITOR) tablet 20 mg  20 mg Oral Daily     cloNIDine (CATAPRES) tablet 0.1 mg  0.1 mg Oral BID     fenofibrate tablet 54 mg  54 mg Oral Daily     gabapentin (NEURONTIN) tablet 600 mg  600 mg Oral TID     mirtazapine  45 mg Oral At Bedtime     multivitamin, therapeutic with minerals  1 tablet Oral Daily     polyethylene glycol  17 g Oral Daily     pramipexole  1.5 mg Oral TID     propranolol (INDERAL) tablet 20 mg  20 mg Oral TID     tamsulosin  0.4 mg Oral Daily      acetaminophen, ibuprofen, ondansetron, QUEtiapine, sennosides, albuterol, fexofenadine (ALLEGRA) tablet 180 mg    Mental Status Examination:     Appearance:  appeared older than stated age, awake and slightly unkempt  Eye Contact:  intense, averts eye contact, repeatedly blinking  Speech:  clear, coherent and normal prosody  Language: Normal  Psychomotor Behavior:  evidence of tics  Mood: less anxious and depressed  Affect:  mood congruent and intensity is normal  Thought Process:  logical, linear and goal oriented no loose associations, perseverates about anxiety, poor sleep, restless legs.   Thought Content:  no evidence of suicidal ideation or homicidal ideation  Oriented to:  time, person, and place  Attention Span and Concentration:  limited  Recent and Remote Memory:  limited  Fund of Knowledge: Poor  Muscle Strength and Tone: normal  Gait and Station: Normal  Insight:  limited  Judgment: improving      Labs/Vitals:     Recent Results (from the past 24 hour(s))   Glucose by meter    Collection Time: 01/16/18  5:19 PM   Result Value Ref Range    Glucose 123 (H) 70 - 99 mg/dL   Glucose by meter    Collection Time: 01/17/18  8:18 AM   Result Value Ref Range    Glucose 140 (H) 70 - 99 mg/dL     B/P: 114/72, T: 98.1, P: 73, R: 14    Impression:   Dhruv continues to display very poor insight. We will make some adjustments in his Seroquel to help sleep.  I think we need to keep an eye on his level of irritability particularly in light of the fact that he is also on Mirapex, which is a dopamine agonist. Phenobarbital taper has completed.      DIagnoses:   1.  Delirium secondary to polypharmacy, resolved  2.  Opioid use disorder  3.  Sedative/Hypnotic use disorder  4.  Anxiety disorder, unspecified  5.  Rule out mood disorder with psychotic features  6.  Rule out intellectual disability  7.  RLS         Plan:   1. Written information given on medications. Side effects, risks, benefits reviewed.  2. Continue  hospitalization.  3. Continue current medications.  4. Recommendation is stay of commitment with referral to [facility].    Attestation:  Patient has been seen and evaluated by me,  Jr Rodriguez MD

## 2018-01-17 NOTE — PROGRESS NOTES
called and left a message for Lo at  [facility] ([phone]) confirming that  will be available by phone on Friday at 1430 to give the care team recommendation at patient's trial.      received a call from Ketty at [place] ([phone]) stating that one of their chemical dependency assessors, Chelsea, will be out to the hospital to do a mobile assessment of patient on Friday morning.      met with patient this afternoon to notify him of the planned CD assessment on Friday. Patient expressed concerns about not being able to work if he is going to a treatment program. 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 He stated that his work day is Monday through Friday 1300 to 2100. He then works a few hours also on Saturday and Sunday. He feels that if he is not able to work that he will lose his housing. He would prefer a morning program from 0900 to 1200 that he could do prior to his work day. He expressed frustration that he had not been heard by his providers when he was on the medical unit and that precipitated the petition for commitment. He feels that the medications that his current physician has prescribed are helping him and that he would be best helped by following up with a psychiatrist in the community following discharge. He did state that he will go along with whatever the court recommends as he does not want to end up being placed in the [facility] hospital. Patient was encouraged to discuss his concerns with his physician as well.

## 2018-01-17 NOTE — PLAN OF CARE
Problem: Depressive Symptoms  Goal: Depressive Symptoms  Signs and symptoms of listed problems will be absent or manageable.  Outcome: No Change  Pt withdrawn in room. Pt received Tylenol for headache, and stated his outpatient doctor has reduced his opiate medication because the state of  [place] is putting limitations on the prescriptions for his chronic pain. Pt stated would like to discharge home very soon, and expressed concern regarding cost of hospitalization and lack of insurance. Pt received Magnesium -citrate for constipation and results within 4 hrs. Pt did not attend evening groups.

## 2018-01-17 NOTE — PROGRESS NOTES
Ricky [place]- [phone]- called to report that he spoke to [facility] about assessment needed. They will do- need H&P faxed to them with cover sheet requesting mobile assessment. Fax# [phone]. Information faxed as requested.

## 2018-01-18 LAB — GLUCOSE BLDC GLUCOMTR-MCNC: 131 MG/DL (ref 70–99)

## 2018-01-18 PROCEDURE — 00000146 ZZHCL STATISTIC GLUCOSE BY METER IP

## 2018-01-18 PROCEDURE — 25000132 ZZH RX MED GY IP 250 OP 250 PS 637: Performed by: INTERNAL MEDICINE

## 2018-01-18 PROCEDURE — 99231 SBSQ HOSP IP/OBS SF/LOW 25: CPT | Performed by: PHYSICIAN ASSISTANT

## 2018-01-18 PROCEDURE — 25000132 ZZH RX MED GY IP 250 OP 250 PS 637: Performed by: PSYCHIATRY & NEUROLOGY

## 2018-01-18 PROCEDURE — 97150 GROUP THERAPEUTIC PROCEDURES: CPT | Mod: GO

## 2018-01-18 PROCEDURE — 90853 GROUP PSYCHOTHERAPY: CPT

## 2018-01-18 PROCEDURE — 25000132 ZZH RX MED GY IP 250 OP 250 PS 637: Performed by: NURSE PRACTITIONER

## 2018-01-18 PROCEDURE — 25000125 ZZHC RX 250: Performed by: PHYSICIAN ASSISTANT

## 2018-01-18 PROCEDURE — 12400006 ZZH R&B MH INTERMEDIATE

## 2018-01-18 PROCEDURE — 25000125 ZZHC RX 250: Performed by: PSYCHIATRY & NEUROLOGY

## 2018-01-18 RX ORDER — MUPIROCIN 20 MG/G
OINTMENT TOPICAL 3 TIMES DAILY
Status: DISCONTINUED | OUTPATIENT
Start: 2018-01-18 | End: 2018-01-19 | Stop reason: HOSPADM

## 2018-01-18 RX ORDER — GINSENG 100 MG
CAPSULE ORAL
Status: DISCONTINUED | OUTPATIENT
Start: 2018-01-18 | End: 2018-01-19 | Stop reason: HOSPADM

## 2018-01-18 RX ADMIN — ALLOPURINOL 300 MG: 300 TABLET ORAL at 08:17

## 2018-01-18 RX ADMIN — FENOFIBRATE 54 MG: 54 TABLET ORAL at 08:18

## 2018-01-18 RX ADMIN — METFORMIN HYDROCHLORIDE 1000 MG: 500 TABLET, EXTENDED RELEASE ORAL at 17:38

## 2018-01-18 RX ADMIN — PROPRANOLOL HYDROCHLORIDE 20 MG: 10 TABLET ORAL at 08:17

## 2018-01-18 RX ADMIN — CLONIDINE HYDROCHLORIDE 0.1 MG: 0.1 TABLET ORAL at 21:08

## 2018-01-18 RX ADMIN — PROPRANOLOL HYDROCHLORIDE 20 MG: 10 TABLET ORAL at 15:46

## 2018-01-18 RX ADMIN — MAGNESIUM OXIDE TAB 400 MG (241.3 MG ELEMENTAL MG) 400 MG: 400 (241.3 MG) TAB at 08:18

## 2018-01-18 RX ADMIN — ATORVASTATIN CALCIUM 20 MG: 10 TABLET, FILM COATED ORAL at 08:16

## 2018-01-18 RX ADMIN — TAMSULOSIN HYDROCHLORIDE 0.4 MG: 0.4 CAPSULE ORAL at 08:18

## 2018-01-18 RX ADMIN — GABAPENTIN 600 MG: 600 TABLET, FILM COATED ORAL at 15:46

## 2018-01-18 RX ADMIN — QUETIAPINE FUMARATE 300 MG: 200 TABLET, FILM COATED ORAL at 00:43

## 2018-01-18 RX ADMIN — PRAMIPEXOLE DIHYDROCHLORIDE 1.5 MG: 1.5 TABLET ORAL at 21:08

## 2018-01-18 RX ADMIN — QUETIAPINE FUMARATE 100 MG: 50 TABLET, FILM COATED ORAL at 15:46

## 2018-01-18 RX ADMIN — PRAMIPEXOLE DIHYDROCHLORIDE 1.5 MG: 1.5 TABLET ORAL at 08:17

## 2018-01-18 RX ADMIN — POLYETHYLENE GLYCOL 3350 17 G: 17 POWDER, FOR SOLUTION ORAL at 08:16

## 2018-01-18 RX ADMIN — MULTIPLE VITAMINS W/ MINERALS TAB 1 TABLET: TAB at 08:17

## 2018-01-18 RX ADMIN — METFORMIN HYDROCHLORIDE 1000 MG: 500 TABLET, EXTENDED RELEASE ORAL at 08:17

## 2018-01-18 RX ADMIN — ONDANSETRON 4 MG: 4 TABLET, FILM COATED ORAL at 10:15

## 2018-01-18 RX ADMIN — PROPRANOLOL HYDROCHLORIDE 20 MG: 10 TABLET ORAL at 21:08

## 2018-01-18 RX ADMIN — GABAPENTIN 600 MG: 600 TABLET, FILM COATED ORAL at 21:09

## 2018-01-18 RX ADMIN — ASPIRIN 81 MG: 81 TABLET, COATED ORAL at 08:18

## 2018-01-18 RX ADMIN — MUPIROCIN: 20 OINTMENT TOPICAL at 21:09

## 2018-01-18 RX ADMIN — QUETIAPINE FUMARATE 300 MG: 200 TABLET, FILM COATED ORAL at 21:08

## 2018-01-18 RX ADMIN — GABAPENTIN 600 MG: 600 TABLET, FILM COATED ORAL at 08:17

## 2018-01-18 RX ADMIN — CLONIDINE HYDROCHLORIDE 0.1 MG: 0.1 TABLET ORAL at 08:20

## 2018-01-18 RX ADMIN — VORTIOXETINE 20 MG: 20 TABLET, FILM COATED ORAL at 08:17

## 2018-01-18 RX ADMIN — MIRTAZAPINE 45 MG: 45 TABLET, FILM COATED ORAL at 21:08

## 2018-01-18 RX ADMIN — PRAMIPEXOLE DIHYDROCHLORIDE 1.5 MG: 1.5 TABLET ORAL at 15:46

## 2018-01-18 RX ADMIN — MUPIROCIN: 20 OINTMENT TOPICAL at 19:01

## 2018-01-18 RX ADMIN — ACETAMINOPHEN 975 MG: 325 TABLET, FILM COATED ORAL at 15:46

## 2018-01-18 ASSESSMENT — ACTIVITIES OF DAILY LIVING (ADL)
ORAL_HYGIENE: INDEPENDENT
DRESS: STREET CLOTHES
GROOMING: INDEPENDENT

## 2018-01-18 NOTE — PROGRESS NOTES
[facility] Psychiatric Progress Note    Interval History:   Pt seen, chart reviewed, case discussed with nursing staff. He has a rash on his left leg and possibly one on his right leg. He does not recall scratching his legs, but he likely did it in his sleep. Presentation is questionable for cellulitis. Will consult Hospitalist Service to see pt for this. 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 He was recommended to request that he works in the morning to avoid this conflict. Again suggested that pt request a stay of commitment at his [facility] tomorrow. He is tolerating medications without side effects.     Review of systems:   10 point Review of Systems completed by Jr Rodriguez MD, is negative other than noted in the HPI     Medications:       vortioxetine  20 mg Oral Daily     QUEtiapine  300 mg Oral At Bedtime     magnesium oxide   Oral Daily     metFORMIN  1,000 mg Oral BID w/meals     allopurinol  300 mg Oral Daily     aspirin EC EC tablet 81 mg  81 mg Oral Daily     atorvastatin (LIPITOR) tablet 20 mg  20 mg Oral Daily     cloNIDine (CATAPRES) tablet 0.1 mg  0.1 mg Oral BID     fenofibrate tablet 54 mg  54 mg Oral Daily     gabapentin (NEURONTIN) tablet 600 mg  600 mg Oral TID     mirtazapine  45 mg Oral At Bedtime     multivitamin, therapeutic with minerals  1 tablet Oral Daily     polyethylene glycol  17 g Oral Daily     pramipexole  1.5 mg Oral TID     propranolol (INDERAL) tablet 20 mg  20 mg Oral TID     tamsulosin  0.4 mg Oral Daily     bacitracin, acetaminophen, ibuprofen, ondansetron, QUEtiapine, sennosides, albuterol, fexofenadine (ALLEGRA) tablet 180 mg    Mental Status Examination:     Appearance:  appeared older than stated age, awake and slightly unkempt  Eye Contact:  intense, averts eye contact, repeatedly blinking  Speech:  clear, coherent and  normal prosody  Language: Normal  Psychomotor Behavior:  evidence of tics  Mood: slightly less anxious and depressed  Affect:  mood congruent and intensity is normal  Thought Process:  logical, linear and goal oriented no loose associations, perseverates about anxiety, poor sleep, restless legs.   Thought Content:  no evidence of suicidal ideation or homicidal ideation  Oriented to:  time, person, and place  Attention Span and Concentration:  limited  Recent and Remote Memory:  limited  Fund of Knowledge: Poor  Muscle Strength and Tone: normal  Gait and Station: Normal  Insight:  limited  Judgment: improving      Labs/Vitals:     Recent Results (from the past 24 hour(s))   Glucose by meter    Collection Time: 01/17/18  5:11 PM   Result Value Ref Range    Glucose 111 (H) 70 - 99 mg/dL     B/P: 114/72, T: 98.1, P: 73, R: 14    Impression:   Dhruv continues to display very poor insight. We will make some adjustments in his Seroquel to help sleep.  I think we need to keep an eye on his level of irritability particularly in light of the fact that he is also on Mirapex, which is a dopamine agonist. Phenobarbital taper has completed.      DIagnoses:   1.  Delirium secondary to polypharmacy, resolved  2.  Opioid use disorder  3.  Sedative/Hypnotic use disorder  4.  Anxiety disorder, unspecified  5.  Rule out mood disorder with psychotic features  6.  Rule out intellectual disability  7.  RLS         Plan:   1. Written information given on medications. Side effects, risks, benefits reviewed.  2. Continue hospitalization.  3. Continue current medications.  4. Recommendation is stay of commitment with referral to [facility].    Attestation:  Patient has been seen and evaluated by me,  Jr Rodriguez MD

## 2018-01-18 NOTE — PROGRESS NOTES
received a call today from Ricky Pitts [facility]  ([phone]). He would like to be updated on an recommendations from the chemical dependency  once the Rule 25 has been completed as well as recommendations from physician. Rule 25 scheduled for tomorrow morning, so will update the court once that has been completed.

## 2018-01-18 NOTE — PLAN OF CARE
"Problem: Depressive Symptoms  Goal: Depressive Symptoms  Signs and symptoms of listed problems will be absent or manageable.   Outcome: Improving  Flat affect, mood calm early but became increasing anxious as day went on. States that \"his plate is overflowing with stuff and that no one understands his problems\" Showered. On the phone a lot during early part of shift, attended am groups and showed appropriate insight. Complaints of nausea, Zofran given, did report feeling better later in shift. Med compliant      "

## 2018-01-18 NOTE — PLAN OF CARE
Problem: General Plan of Care (Inpatient Behavioral)  Goal: Discharge Planning  Attended and participated appropriately in Process group om 1/17/18. On 1/16/17 declined group, saying he was too stressed to come.

## 2018-01-18 NOTE — PLAN OF CARE
Problem: Anxiety (Adult)  Goal: Identify Related Risk Factors and Signs and Symptoms  Related risk factors and signs and symptoms are identified upon initiation of Human Response Clinical Practice Guideline (CPG).   Outcome: Improving  Pt was isolative and withdrawn within the SDU. Pt presents with a flat, blunt, depressed affect with a anxious, irritable, and sad mood. Pt spent almost the entirety of the shift in his room. Pt stated that he's really anxious and wants to leave so he can go back to his job. Pt states that he doesn't want to stay too long away from his job because its becoming super busy (tax season). Pt refused groups, but had some visitors (coworkers) later in the day. Pt ate all of his meal and was med compliant.

## 2018-01-19 VITALS
HEART RATE: 86 BPM | BODY MASS INDEX: 34.07 KG/M2 | HEIGHT: 69 IN | WEIGHT: 230 LBS | DIASTOLIC BLOOD PRESSURE: 87 MMHG | TEMPERATURE: 98.3 F | SYSTOLIC BLOOD PRESSURE: 136 MMHG | RESPIRATION RATE: 16 BRPM

## 2018-01-19 LAB
GLUCOSE BLDC GLUCOMTR-MCNC: 142 MG/DL (ref 70–99)
GLUCOSE BLDC GLUCOMTR-MCNC: 158 MG/DL (ref 70–99)

## 2018-01-19 PROCEDURE — 00000146 ZZHCL STATISTIC GLUCOSE BY METER IP

## 2018-01-19 PROCEDURE — 25000132 ZZH RX MED GY IP 250 OP 250 PS 637: Performed by: INTERNAL MEDICINE

## 2018-01-19 PROCEDURE — 25000132 ZZH RX MED GY IP 250 OP 250 PS 637: Performed by: PSYCHIATRY & NEUROLOGY

## 2018-01-19 PROCEDURE — 25000125 ZZHC RX 250: Performed by: PSYCHIATRY & NEUROLOGY

## 2018-01-19 RX ORDER — QUETIAPINE FUMARATE 100 MG/1
100 TABLET, FILM COATED ORAL DAILY PRN
Qty: 30 TABLET | Refills: 0 | Status: SHIPPED | OUTPATIENT
Start: 2018-01-19 | End: 2023-08-07 | Stop reason: DRUGHIGH

## 2018-01-19 RX ORDER — QUETIAPINE FUMARATE 300 MG/1
300 TABLET, FILM COATED ORAL 2 TIMES DAILY
Qty: 60 TABLET | Refills: 0 | Status: SHIPPED | OUTPATIENT
Start: 2018-01-19

## 2018-01-19 RX ORDER — METFORMIN HYDROCHLORIDE EXTENDED-RELEASE TABLETS 1000 MG/1
1000 TABLET, FILM COATED, EXTENDED RELEASE ORAL 2 TIMES DAILY WITH MEALS
Qty: 60 TABLET | Refills: 0 | Status: SHIPPED | OUTPATIENT
Start: 2018-01-19

## 2018-01-19 RX ORDER — GABAPENTIN 300 MG/1
600 CAPSULE ORAL 3 TIMES DAILY
Qty: 180 CAPSULE | Refills: 0 | Status: SHIPPED | OUTPATIENT
Start: 2018-01-19 | End: 2023-12-15

## 2018-01-19 RX ORDER — MIRTAZAPINE 45 MG/1
45 TABLET, FILM COATED ORAL AT BEDTIME
Qty: 30 TABLET | Refills: 0 | Status: SHIPPED | OUTPATIENT
Start: 2018-01-19 | End: 2023-12-15

## 2018-01-19 RX ADMIN — PRAMIPEXOLE DIHYDROCHLORIDE 1.5 MG: 1.5 TABLET ORAL at 17:50

## 2018-01-19 RX ADMIN — MUPIROCIN: 20 OINTMENT TOPICAL at 17:58

## 2018-01-19 RX ADMIN — PRAMIPEXOLE DIHYDROCHLORIDE 1.5 MG: 1.5 TABLET ORAL at 07:35

## 2018-01-19 RX ADMIN — TAMSULOSIN HYDROCHLORIDE 0.4 MG: 0.4 CAPSULE ORAL at 07:34

## 2018-01-19 RX ADMIN — POLYETHYLENE GLYCOL 3350 17 G: 17 POWDER, FOR SOLUTION ORAL at 07:36

## 2018-01-19 RX ADMIN — ALLOPURINOL 300 MG: 300 TABLET ORAL at 07:36

## 2018-01-19 RX ADMIN — VORTIOXETINE 20 MG: 20 TABLET, FILM COATED ORAL at 07:34

## 2018-01-19 RX ADMIN — MULTIPLE VITAMINS W/ MINERALS TAB 1 TABLET: TAB at 07:34

## 2018-01-19 RX ADMIN — ATORVASTATIN CALCIUM 20 MG: 10 TABLET, FILM COATED ORAL at 07:34

## 2018-01-19 RX ADMIN — GABAPENTIN 600 MG: 600 TABLET, FILM COATED ORAL at 07:35

## 2018-01-19 RX ADMIN — PROPRANOLOL HYDROCHLORIDE 20 MG: 10 TABLET ORAL at 17:50

## 2018-01-19 RX ADMIN — FENOFIBRATE 54 MG: 54 TABLET ORAL at 07:35

## 2018-01-19 RX ADMIN — METFORMIN HYDROCHLORIDE 1000 MG: 500 TABLET, EXTENDED RELEASE ORAL at 07:34

## 2018-01-19 RX ADMIN — ASPIRIN 81 MG: 81 TABLET, COATED ORAL at 07:35

## 2018-01-19 RX ADMIN — GABAPENTIN 600 MG: 600 TABLET, FILM COATED ORAL at 17:49

## 2018-01-19 RX ADMIN — MAGNESIUM OXIDE TAB 400 MG (241.3 MG ELEMENTAL MG) 400 MG: 400 (241.3 MG) TAB at 07:34

## 2018-01-19 RX ADMIN — PROPRANOLOL HYDROCHLORIDE 20 MG: 10 TABLET ORAL at 07:35

## 2018-01-19 RX ADMIN — MUPIROCIN: 20 OINTMENT TOPICAL at 07:36

## 2018-01-19 RX ADMIN — ONDANSETRON 4 MG: 4 TABLET, FILM COATED ORAL at 08:12

## 2018-01-19 RX ADMIN — METFORMIN HYDROCHLORIDE 1000 MG: 500 TABLET, EXTENDED RELEASE ORAL at 17:49

## 2018-01-19 RX ADMIN — ONDANSETRON 4 MG: 4 TABLET, FILM COATED ORAL at 17:55

## 2018-01-19 RX ADMIN — QUETIAPINE FUMARATE 300 MG: 200 TABLET, FILM COATED ORAL at 00:50

## 2018-01-19 RX ADMIN — CLONIDINE HYDROCHLORIDE 0.1 MG: 0.1 TABLET ORAL at 07:36

## 2018-01-19 ASSESSMENT — ACTIVITIES OF DAILY LIVING (ADL)
GROOMING: INDEPENDENT
ORAL_HYGIENE: INDEPENDENT
DRESS: STREET CLOTHES
LAUNDRY: WITH SUPERVISION

## 2018-01-19 NOTE — DISCHARGE INSTRUCTIONS
Behavioral Discharge Planning and Instructions    Summary:   Admitted with chronic narcotic and benzodiazepine dependence, delirium due to polypharmacy, and anxiety.    Main Diagnosis:  Delirium secondary to polypharmacy, resolved; Opioid Use Disorder; Sedative/Hypnotic Use Disorder; Anxiety Disorder, unspecified; Rule out Mood Disorder with psychotic features; Rule out Intellectual Disability; Restless Leg Syndrome    Major Treatments, Procedures and Findings:  Psychiatric assessment. Medication adjustment. Rule 25 chemical dependency assessment. Petition for civil commitment filed while on medical unit.     Symptoms to Report: Increased confusion, Losing more sleep or sleeping too much, Mood getting worse or Thoughts of suicide    Lifestyle Adjustment:  Follow all treatment recommendations. Develop and follow safety plan. You have been granted a stay of commitment by [facility]. This means that if you follow all the terms of the commitment, including completing outpatient chemical dependency treatment, going to all doctor's appointments, and taking all medications as prescribed by your doctor, this commitment will  in six months following the order from the court. For specifics regarding the stay of commitment, please contact your county  or .     Psychiatry Follow-up:     You have an appointment for medication management with Dr. Jr Rodriguez at [facility] on  at 3:00 pm.  You can discuss further follow up care at this appointment.     [facility]  [address], [address]  [place] [address]  Phone:  [phone] / Fax:  [phone]    As a provision of your stay of commitment you need to complete an outpatient chemical dependency program. Per the court you will need to work with your Rule 25 chemical dependency , Chelsea Echevarria, as well as Ricky Pitts, [address] , to  find an appropriate treatment setting. Ricky Pitts will be your temporary county  until a permanent one is assigned. He can be reached at [phone].     You had a Rule 25 chemical dependency assessment done on Friday 01/19/18 [facility]. Please follow up with her regarding finding an appropriate treatment facility. She can be reached at [phone] / [phone] fax.     Resources:   Crisis Intervention: 576.986.7130 or 404-194-8934 (TTY: 790.396.9091).  Call anytime for help.  National [place] [employer] Mental Illness (www.[place].ivone.org): 444.859.8615 or 766-426-9083.  National Suicide Prevention Line (www.mentalhealthmn.org): 909-386-WFZU (6771)  COPE (Crisis Services for Adults) in [facility]: [phone] (Available 24/7)    General Medication Instructions:   See your medication sheet(s) for instructions.   Take all medicines as directed.  Make no changes unless your doctor suggests them.   Go to all your doctor visits.  Be sure to have all your required lab tests. This way, your medicines can be refilled on time.  Do not use any drugs not prescribed by your doctor.  Avoid alcohol.

## 2018-01-19 NOTE — PLAN OF CARE
Problem: General Rehab Plan of Care  Goal: Occupational Therapy Goals  The patient and/or their representative will achieve their patient-specific goals related to the plan of care.  The patient-specific goals include:  INITIAL O.T. ASSESSMENT   Details:  Pt participated in OT Life Skills group today. Affect was blunted and flat. Pt was alert and attentive during sixty minute discussion group. Contributions to the group discussion reflected good tracking and comprehension of concepts discussed. Pt identified yoga as a hobby that has benefited him in the past. He expressed a desire to resume yoga in the future. Speech was articulate and clear. Behavior was appropriate to the group setting.

## 2018-01-19 NOTE — PROGRESS NOTES
"IM visit - consult regarding concerns for left lower extremity cellulitis.    S: Patient noticed redness and scratch marks on LLE today while showering. Not sure when first started. Wonders if scratched self during sleep. Burns with touch. Afeb.    O:  EXAM:   /85  Pulse 87  Temp 98.6  F (37  C) (Oral)  Resp 16  Ht 1.753 m (5' 9\")  Wt 104.3 kg (230 lb)  BMI 33.97 kg/m2   General:  Appears stated age, no acute distress.  Skin:  Warm, dry. Left and right lower extremities viewed for comparison. Several superficial excoriations anteromedial left lower left leg with faint erythema in between lesions. Mild TTP. Similar appearing lesions covering smaller area on right medial lower leg. No obvious swelling. No purulence, induration, or fluctuance. Distal CMS intact.    A/P:  DDx: excoriations with local inflam rxn, impetigo, cellulitis, dvt, other.    Afeb and vss. Exam findings as above.    Impression: likely excoriations with local inflam rxn, but will cover for superficial secondary bacterial infection with Bactroban ointment TID x 7-10 days.    Hospitalist service thanks for consult; will sign off, please contact if acute concerns arise again.    JoAnna Barthell, PA-C  Pager: [phone]  1/18/2018   10:16 PM  "

## 2018-01-19 NOTE — PROGRESS NOTES
Chelsea Echevarria [facility] ([phone] / [phone] fax) from [facility] here this morning to do chemical dependency assessment on patient. She is recommending outpatient chemical dependency treatment. She stated that patient would like to go to [facility] for treatment and then follow up with Dr. Jr Rodriguez for medication management.  will update  with court's decision today so that she can make the appropriate referral for treatment.      received a call from Max Galloway ([phone]) with the [facility] Attorney's Office this morning. She wanted clarification on recommendations prior to patient going to trial this afternoon.  reviewed the recommendation of the chemical dependency  as well as recommendation from physician. Physician is recommending stay of commitment with outpatient chemical dependency treatment at [facility]. If stay of commitment is granted, physician feels that patient could possibly discharge today and return to work this weekend.  awaiting decision from court later this afternoon.      received a call from Max with the [facility] Attorney's Office during the pre-trial exam. She stated that patient was voicing to them that going to an evening program would interfere too much with his ability to work at his job. She asked if there were any other alternatives.  reviewed with her that initially the Rule 25  had only discussed [facility] as that had been the recommendation by physician and patient had been agreeable to going to this program.  attempted to reach Chelsea with [facility] but was only able to leave a message.  then called and spoke with physician to discuss options. Physician verified that there is not a daytime outpatient treatment option through [facility].  then called and spoke to Max again. Contact  information for Rule 25  and physician given to her so that she could directly discuss alternative options.      received a call following the trial. She stated that an agreement was reached for stay of commitment with a provision that patient can go anywhere for outpatient treatment. He will need to work with the [place] 25  and  through [facility] to find an appropriate treatment option per the stay of commitment. His court hold will be released so patient will be free to discharge if physician approves of discharge.   spoke with physician and he will write a discharge order for today.      met with patient after he returned from court this afternoon.  went over the follow up plan for discharge and patient is in agreement with this plan.

## 2018-01-19 NOTE — PLAN OF CARE
Problem: General Plan of Care (Inpatient Behavioral)  Goal: Team Discussion  Team Plan:    Outcome: Improving  BEHAVIORAL TEAM DISCUSSION    Participants: Dr. Rodriguez, Case Management, Nursing staff, Psych associates  Progress: Patient has been pleasant and cooperative with staff and peers. He will be heading to court today with possible discharge upon return.  Continued Stay Criteria/Rationale: Patient is on a court hold  Medical/Physical: n/a  Precautions:   Behavioral Orders   Procedures     Code 1 - Restrict to Unit     Routine Programming     As clinically indicated     Status 15     Every 15 minutes.     Plan: Patient to attend court for mental health and possible discharge upon return pending courts decision.   Rationale for change in precautions or plan: Patient improving and likely able to discharge and attend IOP treatment outpatient.       Problem: Patient Care Overview  Goal: Team Discussion  Team Plan:    Outcome: Improving  BEHAVIORAL TEAM DISCUSSION    Participants: Dr. Rodriguez, Case Management, Nursing staff, Psych associates  Progress: Patient has been pleasant and cooperative with staff and peers. He will be heading to court today with possible discharge upon return.  Continued Stay Criteria/Rationale: Patient is on a court hold  Medical/Physical: n/a  Precautions:   Behavioral Orders   Procedures     Code 1 - Restrict to Unit     Routine Programming     As clinically indicated     Status 15     Every 15 minutes.     Plan: Patient to attend court for mental health and possible discharge upon return pending courts decision.   Rationale for change in precautions or plan: Patient improving and likely able to discharge and attend IOP treatment outpatient.

## 2018-01-19 NOTE — PLAN OF CARE
"Problem: Depressive Symptoms  Goal: Depressive Symptoms  Signs and symptoms of listed problems will be absent or manageable.   Outcome: No Change  Pt ate supper in lounge. Pt has reddened area on his leg, appears to have scratch marks which pt commented may have happened in his sleep. Hospitalist service has ordered cream ; applied twice tonight. Pt participated in Wrap-up group, rambles, eyes blink; pt states he has difficulty with concentration. Pt states he has \"some pain from sitting around too much\"; staff encouraged pt to move around more. Med compliant, eating well.      "

## 2018-01-19 NOTE — PLAN OF CARE
Problem: Depressive Symptoms  Goal: Depressive Symptoms  Signs and symptoms of listed problems will be absent or manageable.   Patient visible on the unit and attending groups appropriately. Pt talking to his  earlier via telephone regarding court today. Pt slightly anxious but pleasant and compliant. MD wants a Stay of Commitment . CD assessment done in AM. Pt would like to go to Out Patient at [facility]. Medications ordered and are in his locker

## 2018-01-19 NOTE — PROGRESS NOTES
[facility] Psychiatric Progress Note    Interval History:   Pt seen, chart reviewed, case discussed with nursing staff. He will have his commitment hearing today. Strongly suggested a stay of commitment. Stay of commitment was granted, but pt is not amenable with treatment at [facility] as he stated that evening treatment will interfere with his work. He will work with his Rule 25  to determine other treatment options. Pt to follow-up with Dr. Rodriguez at [facility] within 4 weeks. 30 day supply of medication provided at time of discharge. Denies suicidal or homicidal ideation. He was cleared for discharge.     Review of systems:   10 point Review of Systems completed by Jr Rodriguez MD, is negative other than noted in the HPI     Medications:       mupirocin   Topical TID     vortioxetine  20 mg Oral Daily     QUEtiapine  300 mg Oral At Bedtime     magnesium oxide   Oral Daily     metFORMIN  1,000 mg Oral BID w/meals     allopurinol  300 mg Oral Daily     aspirin EC EC tablet 81 mg  81 mg Oral Daily     atorvastatin (LIPITOR) tablet 20 mg  20 mg Oral Daily     cloNIDine (CATAPRES) tablet 0.1 mg  0.1 mg Oral BID     fenofibrate tablet 54 mg  54 mg Oral Daily     gabapentin (NEURONTIN) tablet 600 mg  600 mg Oral TID     mirtazapine  45 mg Oral At Bedtime     multivitamin, therapeutic with minerals  1 tablet Oral Daily     polyethylene glycol  17 g Oral Daily     pramipexole  1.5 mg Oral TID     propranolol (INDERAL) tablet 20 mg  20 mg Oral TID     tamsulosin  0.4 mg Oral Daily     bacitracin, acetaminophen, ibuprofen, ondansetron, QUEtiapine, sennosides, albuterol, fexofenadine (ALLEGRA) tablet 180 mg    Mental Status Examination:     Appearance:  appeared older than stated age, awake and slightly unkempt  Eye Contact:  intense, averts eye contact, repeatedly blinking  Speech:  clear, coherent and normal prosody  Language: Normal  Psychomotor Behavior:   evidence of tics  Mood: less anxious and depressed  Affect:  mood congruent and intensity is normal  Thought Process:  logical, linear and goal oriented no loose associations, perseverates about anxiety, poor sleep, restless legs.   Thought Content:  no evidence of suicidal ideation or homicidal ideation  Oriented to:  time, person, and place  Attention Span and Concentration:  fair  Recent and Remote Memory:  fair  Fund of Knowledge: Poor  Muscle Strength and Tone: normal  Gait and Station: Normal  Insight:  limited  Judgment: Fair      Labs/Vitals:     Recent Results (from the past 24 hour(s))   Glucose by meter    Collection Time: 01/18/18  5:20 PM   Result Value Ref Range    Glucose 131 (H) 70 - 99 mg/dL   Glucose by meter    Collection Time: 01/19/18  8:13 AM   Result Value Ref Range    Glucose 158 (H) 70 - 99 mg/dL     B/P: 114/72, T: 98.1, P: 73, R: 14    Impression:   Dhruv was granted a stay of commitment today for CD treatment. He will pursue outpatient treatment and initially follow up with Jr Rodriguez at [facility].      DIagnoses:   1.  Delirium secondary to polypharmacy, resolved  2.  Opioid use disorder  3.  Sedative/Hypnotic use disorder  4.  Anxiety disorder, unspecified  5.  Rule out mood disorder with psychotic features  6.  Rule out intellectual disability  7.  RLS         Plan:   1. Written information given on medications. Side effects, risks, benefits reviewed.  2. Discharge today.  3. Continue current medications.  4. Pt to follow-up with Dr. Rodriguez at [facility] within 4 weeks.    5. Granted stay of commitment.    Attestation:  Patient has been seen and evaluated by me,  Jr Rodriguez MD

## 2018-01-20 NOTE — PROGRESS NOTES
Pt returned from court today, was granted a Stay of Commitment.  has spoken with Dr Rodriguez and dr. Rodriguez approved discharge this evening. Discharge instructions were reviewed with pt with emphasis on the terms of the stay of commitment and the pt's next steps of follow-up appts and CD treatment. pt's questions answered, a copy of discharge instructions given to pt.  medications from [facility] given to pt and reviewed medication schedule. Pt left [address] with all belongings, home medications, and discharge instructions. Pt left at 18:05 ambulatory with his mom to go home via private vehicle. Discharge complete.

## 2018-01-20 NOTE — DISCHARGE SUMMARY
[facility] Psychiatric Discharge Summary      DATE OF ADMISSION: 1/12/2018     DATE OF DISCHARGE: 1/19/2018    PRIMARY CARE PHYSICIAN: [facility], [place]    IDENTIFICATION: Dhruv Shaikh is a 47-year-old gentleman who came in to the emergency room with altered mental status, treated with multiple medications, including high doses of Klonopin and narcotics. For history, see dictation by Dr. Luciano on 1/6/18. For physical summary, see dictation by Fercho Rosas MD on 1/5/18.     HOSPITAL COURSE: Mr. Shaikh is a 47-year-old gentleman who has chronic low back pain and a very well-established history of chemical dependency.  He has been going to a pain clinic and has been getting at least 3 mg of Klonopin daily and large quantities of Norco.  It looks like he recently got 100 Vicodin and 70 Klonopin tablets.  He was brought in on a  hold.  His mother sent the police there to do a welfare check because she could not get a hold of him.  When they got to the scene, things were in complete disarray.  The patient was very altered, disheveled and bedbound.  He was admitted to the medical floor for further stabilization.  His drug screen shows benzodiazepines and opiates.  He has been detoxed multiple times and we have had a number of contacts with him in the system with similar concerns.  He has a history of being on amphetamines/stimulants and chronically abusing narcotics and Klonopin, stating those are the only medications that work for his anxiety and back pain.  It is not believed that he has actually had formal treatment, but we have done multiple consults on him.     Pt was originally admitted to [facility] for stabilization of benzodiazepine withdrawal. He was placed on a 72 hour hold and petition for commitment was filed. While on [facility], he was started on Mirapex, Seroquel, Remeron, and Trintellix for his restless legs and anxiety and depression. He was also started on phenobarbital  and eventually tapered to prevent benzodiazepine withdrawal symptoms. He was then stabilized and moved to  to await his court hearing and continued management of his medications. Seroquel dosing was optimized and he reported feeling less anxious and depressed. He participated in his court hearing for CD commitment. He was granted a stay of commitment. It was recommended that he attended evening IOP treatment at [facility]. However, pt was not amenable to this as he claimed that it was impossible to have his job schedule changed. He planned to work with his Rule 25  to discuss treatment options. He was cleared for discharge. Denies suicidal or homicidal ideation. 30 day supply of medication provided at time of discharge. He will follow up initially with Dr. Rodriguez at [facility].    DISCHARGE MENTAL STATUS EXAMINATION:    Appearance:  appeared older than stated age, awake and slightly unkempt  Eye Contact:  intense, averts eye contact, repeatedly blinking  Speech:  clear, coherent and normal prosody  Language: Normal  Psychomotor Behavior:  evidence of tics  Mood: less anxious and depressed  Affect:  mood congruent and intensity is normal  Thought Process:  logical, linear and goal oriented no loose associations, perseverates about anxiety, poor sleep, restless legs.   Thought Content:  no evidence of suicidal ideation or homicidal ideation  Oriented to:  time, person, and place  Attention Span and Concentration:  fair  Recent and Remote Memory:  fair  Fund of Knowledge: Poor  Muscle Strength and Tone: normal  Gait and Station: Normal  Insight:  limited  Judgment: Fair    LABORATORY DATA:    Refer to hospitalist admission dictation.  Recent Results (from the past 24 hour(s))   Glucose by meter    Collection Time: 01/19/18  8:13 AM   Result Value Ref Range    Glucose 158 (H) 70 - 99 mg/dL   Glucose by meter    Collection Time: 01/19/18  5:45 PM   Result Value Ref Range    Glucose 142  "(H) 70 - 99 mg/dL        /87  Pulse 86  Temp 98.3  F (36.8  C) (Oral)  Resp 16  Ht 1.753 m (5' 9\")  Wt 104.3 kg (230 lb)  BMI 33.97 kg/m2     DISCHARGE MEDICATIONS:      Review of your medicines      CONTINUE these medicines which may have CHANGED, or have new prescriptions. If we are uncertain of the size of tablets/capsules you have at home, strength may be listed as something that might have changed.       Dose / Directions    gabapentin 300 MG capsule   Commonly known as:  NEURONTIN   This may have changed:  medication strength   Used for:  Severe recurrent major depression without psychotic features (H)        Dose:  600 mg   Take 2 capsules (600 mg) by mouth 3 times daily   Quantity:  180 capsule   Refills:  0       metFORMIN osmotic 1000 MG 24 hr tablet   Commonly known as:  FORTAMET   This may have changed:    - medication strength  - when to take this  - Another medication with the same name was removed. Continue taking this medication, and follow the directions you see here.   Used for:  Type 2 diabetes mellitus without complication, without long-term current use of insulin (H)        Dose:  1000 mg   Take 1,000 mg by mouth 2 times daily (with meals)   Quantity:  60 tablet   Refills:  0       * QUEtiapine 100 MG tablet   Commonly known as:  SEROquel   This may have changed:    - medication strength  - how much to take  - when to take this   Used for:  Depression with anxiety        Dose:  100 mg   Take 1 tablet (100 mg) by mouth daily as needed (agitation/anxiety)   Quantity:  30 tablet   Refills:  0       * QUEtiapine 300 MG tablet   Commonly known as:  SEROquel   This may have changed:  when to take this   Used for:  Depression with anxiety        Dose:  300 mg   Take 1 tablet (300 mg) by mouth 2 times daily   Quantity:  60 tablet   Refills:  0       vortioxetine 20 MG tablet   Commonly known as:  TRINTELLIX/BRINTELLIX   This may have changed:    - medication strength  - how much to take "   Used for:  Depression with anxiety        Dose:  20 mg   Take 1 tablet (20 mg) by mouth daily   Quantity:  30 tablet   Refills:  0       * Notice:  This list has 2 medication(s) that are the same as other medications prescribed for you. Read the directions carefully, and ask your doctor or other care provider to review them with you.      CONTINUE these medicines which have NOT CHANGED       Dose / Directions    albuterol 108 (90 BASE) MCG/ACT Inhaler   Commonly known as:  PROAIR HFA/PROVENTIL HFA/VENTOLIN HFA        Dose:  1-2 puff   Inhale 1-2 puffs into the lungs every 4 hours as needed for shortness of breath / dyspnea or wheezing   Refills:  0       ALLEGRA PO        Dose:  180 mg   Take 180 mg by mouth daily as needed for allergies   Refills:  0       allopurinol 300 MG tablet   Commonly known as:  ZYLOPRIM        Dose:  300 mg   Take 300 mg by mouth daily.   Refills:  0       ASPIRIN EC PO        Dose:  81 mg   Take 81 mg by mouth daily   Refills:  0       ATORVASTATIN CALCIUM PO        Dose:  20 mg   Take 20 mg by mouth daily   Refills:  0       CLONIDINE HCL PO        Dose:  0.1 mg   Take 0.1 mg by mouth 2 times daily   Refills:  0       FLOMAX 0.4 MG capsule   Generic drug:  tamsulosin        Dose:  0.4 mg   Take 0.4 mg by mouth daily   Refills:  0       magnesium 250 MG tablet   Used for:  Restless leg syndrome        Dose:  1 tablet   Take 1 tablet by mouth daily   Quantity:  30 tablet   Refills:  0       mirtazapine 45 MG tablet   Commonly known as:  REMERON   Used for:  Depression with anxiety        Dose:  45 mg   Take 1 tablet (45 mg) by mouth At Bedtime   Quantity:  30 tablet   Refills:  0       multivitamin, therapeutic with minerals Tabs tablet   Used for:  Takes dietary supplements        Dose:  1 tablet   Take 1 tablet by mouth daily   Quantity:  100 tablet   Refills:  3       polyethylene glycol Packet   Commonly known as:  MIRALAX/GLYCOLAX        Dose:  1 packet   Take 1 packet by mouth  daily   Refills:  0       pramipexole 1.5 MG tablet   Commonly known as:  MIRAPEX   Used for:  Restless leg syndrome        Dose:  1.5 mg   Take 1 tablet (1.5 mg) by mouth 3 times daily   Refills:  0       PROPRANOLOL HCL PO        Dose:  20 mg   Take 20 mg by mouth 3 times daily   Refills:  0       TRICOR PO        Dose:  54 mg   Take 54 mg by mouth daily   Refills:  0         STOP taking          PHENobarbital 32.4 MG Tabs tablet   Commonly known as:  LUMINAL                Where to get your medicines      These medications were sent to [facility] [place], [place] - [address]  [address], [place] [address]     Phone:  [phone]      gabapentin 300 MG capsule     metFORMIN osmotic 1000 MG 24 hr tablet     mirtazapine 45 MG tablet     QUEtiapine 100 MG tablet     QUEtiapine 300 MG tablet     vortioxetine 20 MG tablet             DISCHARGE DIAGNOSES:    1.  Delirium secondary to polypharmacy, resolved  2.  Opioid use disorder  3.  Sedative/Hypnotic use disorder  4.  Anxiety disorder, unspecified  5.  Rule out mood disorder with psychotic features  6.  Rule out intellectual disability  7.  RLS    DISCHARGE PLAN:     1. Written information given on medications. Side effects, risks, benefits reviewed.  2. Discharge today.  3. Continue current medications.  4. Pt to follow-up with Dr. Rodriguez at [facility] within 4 weeks.    5. Granted stay of commitment.    DISCHARGE FOLLOW-UP:    Psychiatry Follow-up:      Sppointment for medication management with Dr. Jr Rodriguez at [facility] on Wednesday, January 31,2018 at 3:00 pm.  Pt to discuss further follow up care at this appointment.      [facility]  [address], [address]  [place]  [address]  Phone:  [phone] / Fax:  [phone]     As a provision of pt's stay of commitment, he will need to complete an outpatient chemical dependency program. Per the court he will need to work  with Rule 25 chemical dependency , Chelsea Echevarria, as well as [place], [address] , to find an appropriate treatment setting. [place] will be his temporary county  until a permanent one is assigned. He can be reached at [phone].      Pt had a Rule 25 chemical dependency assessment done on Friday 01/19/18 by Chelsea Echevarria [facility] with [facility]. He will follow up with her regarding finding an appropriate treatment facility. She can be reached at [phone] / [phone] fax.     Attestation:   Patient has been seen and evaluated by me, Jr Rodriguez MD.    Patient ID:    Name: Dhruv Shaikh  MRN: [id number]  Admission: 1/12/2018   YOB: 1970

## 2023-08-07 ENCOUNTER — OFFICE VISIT (OUTPATIENT)
Dept: CARDIOLOGY | Facility: CLINIC | Age: 53
End: 2023-08-07
Payer: COMMERCIAL

## 2023-08-07 VITALS
BODY MASS INDEX: 36.31 KG/M2 | HEIGHT: 68 IN | HEART RATE: 67 BPM | DIASTOLIC BLOOD PRESSURE: 63 MMHG | WEIGHT: 239.6 LBS | OXYGEN SATURATION: 97 % | SYSTOLIC BLOOD PRESSURE: 99 MMHG

## 2023-08-07 DIAGNOSIS — E78.5 DYSLIPIDEMIA: ICD-10-CM

## 2023-08-07 DIAGNOSIS — R60.0 BILATERAL LOWER EXTREMITY EDEMA: ICD-10-CM

## 2023-08-07 DIAGNOSIS — R07.9 CHEST PAIN, UNSPECIFIED TYPE: ICD-10-CM

## 2023-08-07 DIAGNOSIS — I10 ESSENTIAL HYPERTENSION: ICD-10-CM

## 2023-08-07 DIAGNOSIS — E11.65 UNCONTROLLED TYPE 2 DIABETES MELLITUS WITH HYPERGLYCEMIA (H): ICD-10-CM

## 2023-08-07 PROCEDURE — 99204 OFFICE O/P NEW MOD 45 MIN: CPT | Performed by: INTERNAL MEDICINE

## 2023-08-07 PROCEDURE — 93000 ELECTROCARDIOGRAM COMPLETE: CPT | Performed by: INTERNAL MEDICINE

## 2023-08-07 RX ORDER — FUROSEMIDE 20 MG
20 TABLET ORAL DAILY
Qty: 90 TABLET | Refills: 3 | Status: SHIPPED | OUTPATIENT
Start: 2023-08-07 | End: 2023-08-15 | Stop reason: SINTOL

## 2023-08-07 NOTE — PATIENT INSTRUCTIONS
Decrease clonididine to 0.1mg by mouth twice daily.  Start taking furosemide (lasix) 20mg by mouth once daily  Check your blood pressure at home every day and keep a log. Call cardiology clinic if the top number is <100 or >150.  Stop propranolol.  Start metoprolol succinate 25 mg by mouth once daily.   Get labs checked next week.   Schedule an echo  Schedule a stress test  Schedule a primary care visit for your diabetes.  Schedule cardiology follow up.

## 2023-08-07 NOTE — PROGRESS NOTES
CARDIOLOGY CLINIC CONSULT    REASON FOR CONSULT: self-referred, chest pain    PRIMARY CARE PHYSICIAN:  [facility] [place] [facility]    HISTORY OF PRESENT ILLNESS:    Dhruv Shaikh is a very nice 53 year old gentleman here to discuss chest pain. He has uncontrolled DM2, HTN, HLD and recently in the past few months has had LE edema. A few weeks ago he had symptoms of achiness and nausea and some chest pain which was worse with exertion and all these symptoms resolved within about 3 days.     Since that time he has been taking a diuretic intermittently for lower extremity edema which he tells me is prescribed by a private practice pain medicine MD, Dr. Uvaldo Ch,.  He is followed for pain control after a back injury from lifting luggage in 2005. He thinks the diuretic gives him stomach aches, so he does not like taking it, but it does help with the LE edema. He thinks it starts with an M, not sure if it is metolazone. He has not taken it for a few days or a few weeks. He is a fairly poor historian.    He states his sugars run usually 140s to 160s, never over 200, but A1C was significantly elevated in May at 10.3 suggesting that blood glucose levels are much higher. 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       PAST MEDICAL HISTORY:  Past Medical History:   Diagnosis Date    Chronic back pain     Diabetes mellitus (H)     High cholesterol     Hypertension        MEDICATIONS:  Current Outpatient Medications   Medication    albuterol (PROAIR HFA/PROVENTIL HFA/VENTOLIN HFA) 108 (90 BASE) MCG/ACT Inhaler    allopurinol (ZYLOPRIM) 300 MG tablet    ASPIRIN EC PO    ATORVASTATIN CALCIUM PO    CLONIDINE HCL PO    Fenofibrate (TRICOR PO)    Fexofenadine HCl (ALLEGRA PO)    magnesium 250 MG tablet    metFORMIN (FORTAMET) 1000 MG 24 hr tablet    mirtazapine (REMERON) 45 MG tablet    multivitamin, therapeutic with minerals (MULTI-VITAMIN) TABS tablet    polyethylene glycol (MIRALAX/GLYCOLAX) Packet    pramipexole (MIRAPEX)  1.5 MG tablet    PROPRANOLOL HCL PO    QUEtiapine (SEROQUEL) 300 MG tablet    tamsulosin (FLOMAX) 0.4 MG capsule    vortioxetine (TRINTELLIX/BRINTELLIX) 20 MG tablet    gabapentin (NEURONTIN) 300 MG capsule     No current facility-administered medications for this visit.       ALLERGIES:  Allergies   Allergen Reactions    Ace Inhibitors     Dust Mites     Mold     Pollen Extract     Weed [Grass]        SOCIAL HISTORY:  I have reviewed this patient's social history and updated it with pertinent information if needed. Dhruv Shaikh  reports that he has never smoked. He has never used smokeless tobacco. He reports that he does not drink alcohol and does not use drugs.       REVIEW OF SYSTEMS:  Skin:        Eyes:       ENT:       Respiratory:  Positive for dyspnea on exertion  Cardiovascular:    Positive for;lower extremity symptoms;edema;chest pain  Gastroenterology:      Genitourinary:       Musculoskeletal:       Neurologic:       Psychiatric:       Heme/Lymph/Imm:       Endocrine:         PHYSICAL EXAM:      BP: 99/63 Pulse: 67     SpO2: 97 %      Vital Signs with Ranges  Pulse:  [67] 67  BP: (99)/(63) 99/63  SpO2:  [97 %] 97 %  239 lbs 9.6 oz    Constitutional: awake, alert, no distress  Eyes: sclera nonicteric  ENT: trachea midline  Respiratory: lungs are clear bilaterally  Cardiovascular: regular somewhat distant, no murmur appreciated  GI: nondistended, nontender, bowel sounds present  Skin: dry, no rash 1-2+ bilateral pitting edema  Musculoskeletal: grossly normal muscle bulk and tone  Neuropsychiatric: normal affect      DATA:   Labs, [facility] 5/5/23  CHEMISTRY, [facility]05/05/2023  Component 05/05/2023         MAGNESIUM -- Load older lab results   SODIUM 137 Load older lab results   POTASSIUM 4.0 Load older lab results   CHLORIDE 100 Load older lab results   CO2,TOTAL 25 Load older lab results   ANION GAP 12 Load older lab results   GLUCOSE 349 High     Load older  "lab results   CALCIUM 9.8 Load older lab results   BUN 18 Load older lab results   CREATININE 1.21 High     Load older lab results   BUN/CREAT RATIO           -- Load older lab results   GFR if  -- Load older lab results   GFR if not  -- Load older lab results   BUN/CREAT RATIO 15    eGFR 72 Low           AIC 10.3    LIPIDS   [facility]05/05/2023  Component 05/05/2023         CHOLESTEROL,TOTAL 193 Load older lab results   TRIGLYCERIDES 351 High     Load older lab results   HDL CHOLESTEROL 36 Low     Load older lab results   CHOL/HDL RATIO           -- Load older lab results   LDL CHOLESTEROL 87 Load older lab results   PATIENT STATUS           -- Load older lab results   NON-HDL CHOLESTEROL 157 High        CHOL/HDL RATIO 5.36 High        VLDL CHOLESTEROL 70 High        PROVIDER ORDERED STATUS RANDOM        LFTs, TSH normal    EKG probably sinus with a pvc, very significant baseline artifact. IVCD        ASSESSMENT:  Exertional chest pain, transient a few weeks ago. \"It hurt to move\". Associated with nausea. Now resolved. High suspicion that symptoms were due to ischemia.   Uncontrolled DM AIC was 10.3 in May at [facility]  Obesity  Dyslipidemia  Hypertension, with relative hypotension   Chronic pain    RECOMMENDATIONS:  Echo  Exercise nuclear stress test  Decrease clonidine from 0.2 mg PO BID to 0.1mg PO BID, ultimately would prefer to wean off clonidine  Furosemide 20mg PO daily  Stop propranolol  Start metoprolol succinate 25mg PO daily  BMP in one week  Cardiology DONTE in about a month and follow up with me in about 3 months.  Establish care with PCP in [facility] (per his request). May also need an endocrinologist due to uncontrolled DM2.     Ursula Coyne MD [facility]  Text Page       "

## 2023-08-07 NOTE — LETTER
8/7/2023    [facility]  [address] [place] [address]    RE: Dhruv MARIE Hawa       Dear Colleague,     I had the pleasure of seeing Dhruv Shaikh in the [facility].  CARDIOLOGY CLINIC CONSULT    REASON FOR CONSULT: self-referred, chest pain    PRIMARY CARE PHYSICIAN:  [facility]    HISTORY OF PRESENT ILLNESS:    Dhruv Shaikh is a very nice 53 year old gentleman here to discuss chest pain. He has uncontrolled DM2, HTN, HLD and recently in the past few months has had LE edema. A few weeks ago he had symptoms of achiness and nausea and some chest pain which was worse with exertion and all these symptoms resolved within about 3 days.     Since that time he has been taking a diuretic intermittently for lower extremity edema which he tells me is prescribed by a private practice pain medicine MD, Dr. Uvaldo Ch,.  He is followed for pain control after a back injury from lifting luggage in 2005. He thinks the diuretic gives him stomach aches, so he does not like taking it, but it does help with the LE edema. He thinks it starts with an M, not sure if it is metolazone. He has not taken it for a few days or a few weeks. He is a fairly poor historian.    He states his sugars run usually 140s to 160s, never over 200, but A1C was significantly elevated in May at 10.3 suggesting that blood glucose levels are much higher. 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       PAST MEDICAL HISTORY:  Past Medical History:   Diagnosis Date    Chronic back pain     Diabetes mellitus (H)     High cholesterol     Hypertension        MEDICATIONS:  Current Outpatient Medications   Medication    albuterol (PROAIR HFA/PROVENTIL HFA/VENTOLIN HFA) 108 (90 BASE) MCG/ACT Inhaler    allopurinol (ZYLOPRIM) 300 MG tablet    ASPIRIN EC PO    ATORVASTATIN CALCIUM PO    CLONIDINE HCL PO    Fenofibrate (TRICOR PO)    Fexofenadine HCl (ALLEGRA PO)    magnesium 250 MG tablet    metFORMIN  (FORTAMET) 1000 MG 24 hr tablet    mirtazapine (REMERON) 45 MG tablet    multivitamin, therapeutic with minerals (MULTI-VITAMIN) TABS tablet    polyethylene glycol (MIRALAX/GLYCOLAX) Packet    pramipexole (MIRAPEX) 1.5 MG tablet    PROPRANOLOL HCL PO    QUEtiapine (SEROQUEL) 300 MG tablet    tamsulosin (FLOMAX) 0.4 MG capsule    vortioxetine (TRINTELLIX/BRINTELLIX) 20 MG tablet    gabapentin (NEURONTIN) 300 MG capsule     No current facility-administered medications for this visit.       ALLERGIES:  Allergies   Allergen Reactions    Ace Inhibitors     Dust Mites     Mold     Pollen Extract     Weed [Grass]        SOCIAL HISTORY:  I have reviewed this patient's social history and updated it with pertinent information if needed. Dhruv MARIE Hawa  reports that he has never smoked. He has never used smokeless tobacco. He reports that he does not drink alcohol and does not use drugs.       REVIEW OF SYSTEMS:  Skin:        Eyes:       ENT:       Respiratory:  Positive for dyspnea on exertion  Cardiovascular:    Positive for;lower extremity symptoms;edema;chest pain  Gastroenterology:      Genitourinary:       Musculoskeletal:       Neurologic:       Psychiatric:       Heme/Lymph/Imm:       Endocrine:         PHYSICAL EXAM:      BP: 99/63 Pulse: 67     SpO2: 97 %      Vital Signs with Ranges  Pulse:  [67] 67  BP: (99)/(63) 99/63  SpO2:  [97 %] 97 %  239 lbs 9.6 oz    Constitutional: awake, alert, no distress  Eyes: sclera nonicteric  ENT: trachea midline  Respiratory: lungs are clear bilaterally  Cardiovascular: regular somewhat distant, no murmur appreciated  GI: nondistended, nontender, bowel sounds present  Skin: dry, no rash 1-2+ bilateral pitting edema  Musculoskeletal: grossly normal muscle bulk and tone  Neuropsychiatric: normal affect      DATA:   Labs, [facility] 5/5/23  CHEMISTRY, [facility]05/05/2023  Component 05/05/2023         MAGNESIUM -- Load older lab results   SODIUM  "137 Load older lab results   POTASSIUM 4.0 Load older lab results   CHLORIDE 100 Load older lab results   CO2,TOTAL 25 Load older lab results   ANION GAP 12 Load older lab results   GLUCOSE 349 High     Load older lab results   CALCIUM 9.8 Load older lab results   BUN 18 Load older lab results   CREATININE 1.21 High     Load older lab results   BUN/CREAT RATIO           -- Load older lab results   GFR if  -- Load older lab results   GFR if not  -- Load older lab results   BUN/CREAT RATIO 15    eGFR 72 Low           AIC 10.3    LIPIDS   [facility]05/05/2023  Component 05/05/2023         CHOLESTEROL,TOTAL 193 Load older lab results   TRIGLYCERIDES 351 High     Load older lab results   HDL CHOLESTEROL 36 Low     Load older lab results   CHOL/HDL RATIO           -- Load older lab results   LDL CHOLESTEROL 87 Load older lab results   PATIENT STATUS           -- Load older lab results   NON-HDL CHOLESTEROL 157 High        CHOL/HDL RATIO 5.36 High        VLDL CHOLESTEROL 70 High        PROVIDER ORDERED STATUS RANDOM        LFTs, TSH normal    EKG probably sinus with a pvc, very significant baseline artifact. IVCD        ASSESSMENT:  Exertional chest pain, transient a few weeks ago. \"It hurt to move\". Associated with nausea. Now resolved. High suspicion that symptoms were due to ischemia.   Uncontrolled DM AIC was 10.3 in May at [facility]  Obesity  Dyslipidemia  Hypertension, with relative hypotension   Chronic pain    RECOMMENDATIONS:  Echo  Exercise nuclear stress test  Decrease clonidine from 0.2 mg PO BID to 0.1mg PO BID, ultimately would prefer to wean off clonidine  Furosemide 20mg PO daily  Stop propranolol  Start metoprolol succinate 25mg PO daily  BMP in one week  Cardiology DONTE in about a month and follow up with me in about 3 months.  Establish care with PCP in [facility] (per his request). May also need an endocrinologist due to uncontrolled " DM2.     Ursula Coyne MD [facility]  Text Page         Thank you for allowing me to participate in the care of your patient.      Sincerely,     Ursula Coyne MD     [facility]  cc:   No referring provider defined for this encounter.

## 2023-08-14 ENCOUNTER — LAB (OUTPATIENT)
Dept: LAB | Facility: CLINIC | Age: 53
End: 2023-08-14
Payer: COMMERCIAL

## 2023-08-14 ENCOUNTER — CARE COORDINATION (OUTPATIENT)
Dept: CARDIOLOGY | Facility: CLINIC | Age: 53
End: 2023-08-14

## 2023-08-14 DIAGNOSIS — E87.6 HYPOKALEMIA: Primary | ICD-10-CM

## 2023-08-14 DIAGNOSIS — R60.0 BILATERAL LOWER EXTREMITY EDEMA: ICD-10-CM

## 2023-08-14 DIAGNOSIS — E11.65 UNCONTROLLED TYPE 2 DIABETES MELLITUS WITH HYPERGLYCEMIA (H): ICD-10-CM

## 2023-08-14 DIAGNOSIS — R07.9 CHEST PAIN, UNSPECIFIED TYPE: ICD-10-CM

## 2023-08-14 LAB
ANION GAP SERPL CALCULATED.3IONS-SCNC: 16 MMOL/L (ref 7–15)
BUN SERPL-MCNC: 18.4 MG/DL (ref 6–20)
CALCIUM SERPL-MCNC: 9.8 MG/DL (ref 8.6–10)
CHLORIDE SERPL-SCNC: 91 MMOL/L (ref 98–107)
CREAT SERPL-MCNC: 1.29 MG/DL (ref 0.67–1.17)
DEPRECATED HCO3 PLAS-SCNC: 28 MMOL/L (ref 22–29)
GFR SERPL CREATININE-BSD FRML MDRD: 66 ML/MIN/1.73M2
GLUCOSE SERPL-MCNC: 286 MG/DL (ref 70–99)
POTASSIUM SERPL-SCNC: 2.7 MMOL/L (ref 3.4–5.3)
SODIUM SERPL-SCNC: 135 MMOL/L (ref 136–145)

## 2023-08-14 PROCEDURE — 36415 COLL VENOUS BLD VENIPUNCTURE: CPT | Performed by: INTERNAL MEDICINE

## 2023-08-14 PROCEDURE — 80048 BASIC METABOLIC PNL TOTAL CA: CPT | Performed by: INTERNAL MEDICINE

## 2023-08-14 RX ORDER — POTASSIUM CHLORIDE 1500 MG/1
40 TABLET, EXTENDED RELEASE ORAL 2 TIMES DAILY
Qty: 60 TABLET | Refills: 0 | Status: SHIPPED | OUTPATIENT
Start: 2023-08-14 | End: 2023-10-03

## 2023-08-14 RX ORDER — POTASSIUM CHLORIDE 1500 MG/1
40 TABLET, EXTENDED RELEASE ORAL 2 TIMES DAILY
Qty: 60 TABLET | Refills: 0 | Status: SHIPPED | OUTPATIENT
Start: 2023-08-14 | End: 2023-08-14

## 2023-08-15 ENCOUNTER — TELEPHONE (OUTPATIENT)
Dept: CARDIOLOGY | Facility: CLINIC | Age: 53
End: 2023-08-15

## 2023-08-15 ENCOUNTER — LAB (OUTPATIENT)
Dept: LAB | Facility: CLINIC | Age: 53
End: 2023-08-15
Payer: COMMERCIAL

## 2023-08-15 DIAGNOSIS — E87.6 HYPOKALEMIA: ICD-10-CM

## 2023-08-15 DIAGNOSIS — E87.6 HYPOKALEMIA: Primary | ICD-10-CM

## 2023-08-15 LAB
ANION GAP SERPL CALCULATED.3IONS-SCNC: 14 MMOL/L (ref 7–15)
BUN SERPL-MCNC: 22 MG/DL (ref 6–20)
CALCIUM SERPL-MCNC: 9.9 MG/DL (ref 8.6–10)
CHLORIDE SERPL-SCNC: 89 MMOL/L (ref 98–107)
CREAT SERPL-MCNC: 1.29 MG/DL (ref 0.67–1.17)
DEPRECATED HCO3 PLAS-SCNC: 31 MMOL/L (ref 22–29)
GFR SERPL CREATININE-BSD FRML MDRD: 66 ML/MIN/1.73M2
GLUCOSE SERPL-MCNC: 449 MG/DL (ref 70–99)
POTASSIUM SERPL-SCNC: 3.1 MMOL/L (ref 3.4–5.3)
SODIUM SERPL-SCNC: 134 MMOL/L (ref 136–145)

## 2023-08-15 PROCEDURE — 80048 BASIC METABOLIC PNL TOTAL CA: CPT | Performed by: INTERNAL MEDICINE

## 2023-08-15 PROCEDURE — 36415 COLL VENOUS BLD VENIPUNCTURE: CPT | Performed by: INTERNAL MEDICINE

## 2023-08-15 NOTE — TELEPHONE ENCOUNTER
"K+ recheck today came back at 3.1.  It was 2.7 yesterday.  Per Dr Coyne, \"Continue potassium 40 mEq once BID for 2 days and recheck K+ in 2 days.\"  Lab order placed.  Message sent to scheduling requesting they call pt to schedule lab appt for 8/17/23.  Left detailed message for pt's mother, Nydia, with Dr Coyne's recommendations.  Also left detailed message on Dhruv's cell phone.    "

## 2023-08-15 NOTE — TELEPHONE ENCOUNTER
[facility] Call Center    Phone Message    May a detailed message be left on voicemail: yes     Reason for Call: Other: Pt mother called in after message from Dr mena that labs need to be done for Pt     Action Taken: Other: cardio    Travel Screening: Not Applicable  Thank you!  [facility]

## 2023-08-15 NOTE — TELEPHONE ENCOUNTER
Called back to Nydia, she has scheduled pt a lab appointment today at 1:45 pm. Pt had abnormal labs yesterday with low potassium of 2.7. Reviewed plan per Dr. Coyne's result note:     Ursula Coyne MD  [facility] Heart Team 1  Called Dhruv    He will get a ride from his mom to  potassium supplement. I also spoke with his mom.    Stop furosemide  Start KCl 40meq BID for two doses  Recheck BMP tomorrow  Or go to ED. Do not drive.    Please follow up with patient tomorrow regarding these instructions.    Ursula Coyne MD on 8/14/2023 at 5:32 PM    Nydia wrote down medication instructions and will confirm pt has made these changes. Provided direct phone number for Team 1, Nydia will call back if any further questions. Will await lab results.

## 2023-08-17 ENCOUNTER — LAB (OUTPATIENT)
Dept: LAB | Facility: CLINIC | Age: 53
End: 2023-08-17
Payer: COMMERCIAL

## 2023-08-17 DIAGNOSIS — E87.6 HYPOKALEMIA: ICD-10-CM

## 2023-08-17 LAB
ANION GAP SERPL CALCULATED.3IONS-SCNC: 13 MMOL/L (ref 7–15)
BUN SERPL-MCNC: 18.4 MG/DL (ref 6–20)
CALCIUM SERPL-MCNC: 9.5 MG/DL (ref 8.6–10)
CHLORIDE SERPL-SCNC: 93 MMOL/L (ref 98–107)
CREAT SERPL-MCNC: 1.26 MG/DL (ref 0.67–1.17)
DEPRECATED HCO3 PLAS-SCNC: 29 MMOL/L (ref 22–29)
GFR SERPL CREATININE-BSD FRML MDRD: 68 ML/MIN/1.73M2
GLUCOSE SERPL-MCNC: 360 MG/DL (ref 70–99)
POTASSIUM SERPL-SCNC: 3.3 MMOL/L (ref 3.4–5.3)
SODIUM SERPL-SCNC: 135 MMOL/L (ref 136–145)

## 2023-08-17 PROCEDURE — 36415 COLL VENOUS BLD VENIPUNCTURE: CPT | Performed by: INTERNAL MEDICINE

## 2023-08-17 PROCEDURE — 80048 BASIC METABOLIC PNL TOTAL CA: CPT | Performed by: INTERNAL MEDICINE

## 2023-08-18 DIAGNOSIS — E87.6 HYPOKALEMIA: Primary | ICD-10-CM

## 2023-08-25 ENCOUNTER — TELEPHONE (OUTPATIENT)
Dept: CARDIOLOGY | Facility: CLINIC | Age: 53
End: 2023-08-25

## 2023-08-25 ENCOUNTER — HOSPITAL ENCOUNTER (EMERGENCY)
Facility: CLINIC | Age: 53
Discharge: LEFT BEFORE DISCHARGE | End: 2023-08-25
Admitting: EMERGENCY MEDICINE
Payer: COMMERCIAL

## 2023-08-25 ENCOUNTER — LAB (OUTPATIENT)
Dept: LAB | Facility: CLINIC | Age: 53
End: 2023-08-25
Payer: COMMERCIAL

## 2023-08-25 VITALS
TEMPERATURE: 97.3 F | DIASTOLIC BLOOD PRESSURE: 73 MMHG | SYSTOLIC BLOOD PRESSURE: 119 MMHG | HEIGHT: 68 IN | HEART RATE: 75 BPM | BODY MASS INDEX: 35.61 KG/M2 | WEIGHT: 235 LBS | RESPIRATION RATE: 18 BRPM | OXYGEN SATURATION: 97 %

## 2023-08-25 DIAGNOSIS — E87.6 HYPOKALEMIA: ICD-10-CM

## 2023-08-25 LAB
ALBUMIN SERPL BCG-MCNC: 4.3 G/DL (ref 3.5–5.2)
ALP SERPL-CCNC: 104 U/L (ref 40–129)
ALT SERPL W P-5'-P-CCNC: 27 U/L (ref 0–70)
ANION GAP SERPL CALCULATED.3IONS-SCNC: 13 MMOL/L (ref 7–15)
ANION GAP SERPL CALCULATED.3IONS-SCNC: 14 MMOL/L (ref 7–15)
AST SERPL W P-5'-P-CCNC: 17 U/L (ref 0–45)
B-OH-BUTYR SERPL-SCNC: <0.18 MMOL/L
BASOPHILS # BLD AUTO: 0.1 10E3/UL (ref 0–0.2)
BASOPHILS NFR BLD AUTO: 1 %
BILIRUB SERPL-MCNC: 0.4 MG/DL
BUN SERPL-MCNC: 17.7 MG/DL (ref 6–20)
BUN SERPL-MCNC: 17.9 MG/DL (ref 6–20)
CALCIUM SERPL-MCNC: 9.3 MG/DL (ref 8.6–10)
CALCIUM SERPL-MCNC: 9.6 MG/DL (ref 8.6–10)
CHLORIDE SERPL-SCNC: 88 MMOL/L (ref 98–107)
CHLORIDE SERPL-SCNC: 90 MMOL/L (ref 98–107)
CREAT SERPL-MCNC: 1.18 MG/DL (ref 0.67–1.17)
CREAT SERPL-MCNC: 1.21 MG/DL (ref 0.67–1.17)
DEPRECATED HCO3 PLAS-SCNC: 28 MMOL/L (ref 22–29)
DEPRECATED HCO3 PLAS-SCNC: 31 MMOL/L (ref 22–29)
EOSINOPHIL # BLD AUTO: 0.1 10E3/UL (ref 0–0.7)
EOSINOPHIL NFR BLD AUTO: 1 %
ERYTHROCYTE [DISTWIDTH] IN BLOOD BY AUTOMATED COUNT: 13.2 % (ref 10–15)
GFR SERPL CREATININE-BSD FRML MDRD: 72 ML/MIN/1.73M2
GFR SERPL CREATININE-BSD FRML MDRD: 74 ML/MIN/1.73M2
GLUCOSE BLDC GLUCOMTR-MCNC: 443 MG/DL (ref 70–99)
GLUCOSE SERPL-MCNC: 477 MG/DL (ref 70–99)
GLUCOSE SERPL-MCNC: 558 MG/DL (ref 70–99)
HCT VFR BLD AUTO: 37.6 % (ref 40–53)
HGB BLD-MCNC: 13.4 G/DL (ref 13.3–17.7)
HOLD SPECIMEN: NORMAL
IMM GRANULOCYTES # BLD: 0.1 10E3/UL
IMM GRANULOCYTES NFR BLD: 1 %
LYMPHOCYTES # BLD AUTO: 1.2 10E3/UL (ref 0.8–5.3)
LYMPHOCYTES NFR BLD AUTO: 15 %
MCH RBC QN AUTO: 30.1 PG (ref 26.5–33)
MCHC RBC AUTO-ENTMCNC: 35.6 G/DL (ref 31.5–36.5)
MCV RBC AUTO: 85 FL (ref 78–100)
MONOCYTES # BLD AUTO: 0.6 10E3/UL (ref 0–1.3)
MONOCYTES NFR BLD AUTO: 7 %
NEUTROPHILS # BLD AUTO: 6 10E3/UL (ref 1.6–8.3)
NEUTROPHILS NFR BLD AUTO: 75 %
NRBC # BLD AUTO: 0 10E3/UL
NRBC BLD AUTO-RTO: 0 /100
PLATELET # BLD AUTO: 274 10E3/UL (ref 150–450)
POTASSIUM SERPL-SCNC: 3.1 MMOL/L (ref 3.4–5.3)
POTASSIUM SERPL-SCNC: 3.1 MMOL/L (ref 3.4–5.3)
PROT SERPL-MCNC: 7.1 G/DL (ref 6.4–8.3)
RBC # BLD AUTO: 4.45 10E6/UL (ref 4.4–5.9)
SODIUM SERPL-SCNC: 130 MMOL/L (ref 136–145)
SODIUM SERPL-SCNC: 134 MMOL/L (ref 136–145)
WBC # BLD AUTO: 8 10E3/UL (ref 4–11)

## 2023-08-25 PROCEDURE — 82962 GLUCOSE BLOOD TEST: CPT

## 2023-08-25 PROCEDURE — 80053 COMPREHEN METABOLIC PANEL: CPT | Performed by: INTERNAL MEDICINE

## 2023-08-25 PROCEDURE — 36415 COLL VENOUS BLD VENIPUNCTURE: CPT | Performed by: EMERGENCY MEDICINE

## 2023-08-25 PROCEDURE — 99281 EMR DPT VST MAYX REQ PHY/QHP: CPT

## 2023-08-25 PROCEDURE — 36415 COLL VENOUS BLD VENIPUNCTURE: CPT | Performed by: INTERNAL MEDICINE

## 2023-08-25 PROCEDURE — 85025 COMPLETE CBC W/AUTO DIFF WBC: CPT | Performed by: EMERGENCY MEDICINE

## 2023-08-25 PROCEDURE — 82010 KETONE BODYS QUAN: CPT | Performed by: EMERGENCY MEDICINE

## 2023-08-25 NOTE — ED NOTES
"PIT/Triage Evaluation    Patient presented for evaluation of hyperglycemia. The patient reports that he was with his cardiologist testing for potassium this morning when he found high blood sugar because he forgot to take his diabetes medication. He affirms that he took his metformin and glipizide when he returned home after his appointment.      Exam is notable for:    Patient Vitals for the past 24 hrs:   BP Temp Temp src Pulse Resp SpO2 Height Weight   08/25/23 1350 119/73 97.3  F (36.3  C) Temporal 75 18 97 % 1.727 m (5' 8\") 106.6 kg (235 lb)       General: Alert and Interactive.   Head: No signs of trauma.   Mouth/Throat: Oropharynx is clear and moist.   Eyes: Conjunctivae are normal. Pupils are equal, round, and reactive to light.   Neck: Normal range of motion. No nuchal rigidity.   CV: Normal rate and regular rhythm.    Resp: Effort normal and breath sounds normal. No respiratory distress.   GI: Soft. There is no tenderness or guarding.   MSK: Normal range of motion. no edema.   Neuro: The patient is alert and oriented to person, place, and time.  PERRLA, EOMI, strength in upper/lower extremities normal and symmetrical.   Sensation normal. Speech normal.  GCS eye subscore is 4. GCS verbal subscore is 5. GCS motor subscore is 6.   Skin: Skin is warm and dry. No rash noted.   Psych: normal mood and affect. behavior is normal.       Appropriate interventions for symptom management were initiated if applicable.  Appropriate diagnostic tests were initiated if indicated.    Important information for subsequent clinician:  Hyperglycemia history of diabetes.  Hemodynamically stable.  Labs pending    I briefly evaluated the patient and developed an initial plan of care. I discussed this plan and explained that this brief interaction does not constitute a full evaluation. Patient/family understands that they should wait to be fully evaluated and discuss any test results with another clinician prior to leaving the " [facility].    I, Jarod Bonilla, am serving as a scribe to document services personally performed by Lalito Moreira MD based on my observations and the provider's statements to me.         Lalito Moreira MD  08/25/23 1918

## 2023-08-25 NOTE — TELEPHONE ENCOUNTER
Pt had BMP today per Dr. Coyne. Received call from lab reporting critical glucose 558. Potassium also low at 3.1 and sodium low 130. Dr. Coyne aware and advised pt should go to ER as he will need to managed in the hospital. Called pt, no answer. Left VM requesting call back to Team 1 to review urgent results. Called pt's mother Nydia and reviewed results and recommendation to go to ER. Nydia stated she will call pt and take him to the ER.     Component      Latest Ref Rng 8/17/2023  1:35 PM 8/25/2023  10:53 AM   Sodium      136 - 145 mmol/L 135 (L)  130 (L)    Potassium      3.4 - 5.3 mmol/L 3.3 (L)  3.1 (L)    Chloride      98 - 107 mmol/L 93 (L)  88 (L)    Carbon Dioxide (CO2)      22 - 29 mmol/L 29  28    Anion Gap      7 - 15 mmol/L 13  14    Urea Nitrogen      6.0 - 20.0 mg/dL 18.4  17.7    Creatinine      0.67 - 1.17 mg/dL 1.26 (H)  1.18 (H)    Calcium      8.6 - 10.0 mg/dL 9.5  9.3    Glucose      70 - 99 mg/dL 360 (H)  558 (HH)    GFR Estimate      >60 mL/min/1.73m2 68  74       Addendum 8/25/23 - Received call back from pt's mother Nydia who stated she spoke with pt and he does not want to go to the ER. Advised it is Dr. Coyne's recommendation for pt to go to the ER due to his critically high glucose level as he will need to managed in the hospital. Nydia said she has someone coming to her house to do an inspection so she cannot leave at the moment but will bring pt to the ER as soon as she is able. Offered to call pt again and speak with him directly, Nydia stated this might help but he will probably not answer. Called pt again, no answer. Left VM with recommendation to go to ER and call back number for Team 1.       
No

## 2023-08-25 NOTE — ED TRIAGE NOTES
Pt sent from cardiology office for hyperglycemia. Pt states he has not been checking blood sugar at home because his glucometer . Took metformin and glipizide when returning home from his appt and prior to coming to ED.      Triage Assessment       Row Name [id number]       Triage Assessment (Adult)    Airway WDL WDL       Respiratory WDL    Respiratory WDL WDL       Skin Circulation/Temperature WDL    Skin Circulation/Temperature WDL WDL       Cardiac WDL    Cardiac WDL WDL       Peripheral/Neurovascular WDL    Peripheral Neurovascular WDL WDL       Cognitive/Neuro/Behavioral WDL    Cognitive/Neuro/Behavioral WDL WDL

## 2023-09-19 ENCOUNTER — TELEPHONE (OUTPATIENT)
Dept: CARDIOLOGY | Facility: CLINIC | Age: 53
End: 2023-09-19
Payer: COMMERCIAL

## 2023-09-19 NOTE — TELEPHONE ENCOUNTER
Attempted to call Nydia back (consent to communicate on file). No answer, reviewed Dr. Coyne last clinic note and left detailed message per last note on reason for tests and follow up. Encouraged her to call nurses back if she has any other questions or to call scheduling to schedule test and follow up.     Liliam Estrada RN

## 2023-09-19 NOTE — TELEPHONE ENCOUNTER
[facility] Call Center    Phone Message    May a detailed message be left on voicemail: yes     Reason for Call: Other: Patient mother Nydai called regarding patient needing to schedule appt for NM Exercise stress test (nuc card), Echocardiogram Complete, and Follow-Up with Cardiology DONTE. Nydia would like to know more information about the appts. Patient does not know why he needs to come in. Please reach out      Action Taken: Other: Cardiology     Travel Screening: Not Applicable    Thank you!  [facility]

## 2023-10-03 DIAGNOSIS — E87.6 HYPOKALEMIA: ICD-10-CM

## 2023-10-03 RX ORDER — POTASSIUM CHLORIDE 1500 MG/1
40 TABLET, EXTENDED RELEASE ORAL 2 TIMES DAILY
Qty: 120 TABLET | Refills: 3 | Status: SHIPPED | OUTPATIENT
Start: 2023-10-03 | End: 2024-05-06

## 2023-10-03 NOTE — TELEPHONE ENCOUNTER
Received refill request for potassium chloride 40 mEq BID. [facility] Refill Guideline reviewed.  Medication meets criteria for refill.

## 2023-10-06 ENCOUNTER — HOSPITAL ENCOUNTER (OUTPATIENT)
Dept: CARDIOLOGY | Facility: CLINIC | Age: 53
Discharge: HOME | End: 2023-10-06
Attending: INTERNAL MEDICINE | Admitting: INTERNAL MEDICINE
Payer: COMMERCIAL

## 2023-10-06 DIAGNOSIS — R07.9 CHEST PAIN, UNSPECIFIED TYPE: ICD-10-CM

## 2023-10-06 LAB — LVEF ECHO: NORMAL

## 2023-10-06 PROCEDURE — 93306 TTE W/DOPPLER COMPLETE: CPT | Mod: 26 | Performed by: INTERNAL MEDICINE

## 2023-10-06 PROCEDURE — 93306 TTE W/DOPPLER COMPLETE: CPT

## 2023-12-15 ENCOUNTER — OFFICE VISIT (OUTPATIENT)
Dept: FAMILY MEDICINE | Facility: CLINIC | Age: 53
End: 2023-12-15
Payer: COMMERCIAL

## 2023-12-15 VITALS
HEIGHT: 68 IN | SYSTOLIC BLOOD PRESSURE: 124 MMHG | WEIGHT: 229 LBS | HEART RATE: 69 BPM | BODY MASS INDEX: 34.71 KG/M2 | RESPIRATION RATE: 18 BRPM | DIASTOLIC BLOOD PRESSURE: 82 MMHG | OXYGEN SATURATION: 96 % | TEMPERATURE: 97.8 F

## 2023-12-15 DIAGNOSIS — E78.5 DYSLIPIDEMIA: ICD-10-CM

## 2023-12-15 DIAGNOSIS — E87.6 HYPOKALEMIA: ICD-10-CM

## 2023-12-15 DIAGNOSIS — F41.1 GAD (GENERALIZED ANXIETY DISORDER): ICD-10-CM

## 2023-12-15 DIAGNOSIS — R33.9 URINARY RETENTION: ICD-10-CM

## 2023-12-15 DIAGNOSIS — I10 ESSENTIAL HYPERTENSION: ICD-10-CM

## 2023-12-15 DIAGNOSIS — F39 MOOD DISORDER (H): ICD-10-CM

## 2023-12-15 DIAGNOSIS — Z12.11 SCREEN FOR COLON CANCER: ICD-10-CM

## 2023-12-15 DIAGNOSIS — F11.20 UNCOMPLICATED OPIOID DEPENDENCE (H): ICD-10-CM

## 2023-12-15 DIAGNOSIS — E11.65 UNCONTROLLED TYPE 2 DIABETES MELLITUS WITH HYPERGLYCEMIA (H): Primary | ICD-10-CM

## 2023-12-15 LAB
ALBUMIN UR-MCNC: NEGATIVE MG/DL
ANION GAP SERPL CALCULATED.3IONS-SCNC: 13 MMOL/L (ref 7–15)
APPEARANCE UR: ABNORMAL
BACTERIA #/AREA URNS HPF: ABNORMAL /HPF
BILIRUB UR QL STRIP: NEGATIVE
BUN SERPL-MCNC: 11.6 MG/DL (ref 6–20)
CALCIUM SERPL-MCNC: 9.7 MG/DL (ref 8.6–10)
CHLORIDE SERPL-SCNC: 97 MMOL/L (ref 98–107)
COLOR UR AUTO: YELLOW
CREAT SERPL-MCNC: 0.93 MG/DL (ref 0.67–1.17)
DEPRECATED HCO3 PLAS-SCNC: 28 MMOL/L (ref 22–29)
EGFRCR SERPLBLD CKD-EPI 2021: >90 ML/MIN/1.73M2
GLUCOSE SERPL-MCNC: 233 MG/DL (ref 70–99)
GLUCOSE UR STRIP-MCNC: >=1000 MG/DL
HBA1C MFR BLD: 9.2 % (ref 0–5.6)
HGB UR QL STRIP: NEGATIVE
KETONES UR STRIP-MCNC: NEGATIVE MG/DL
LEUKOCYTE ESTERASE UR QL STRIP: NEGATIVE
MAGNESIUM SERPL-MCNC: 1.5 MG/DL (ref 1.7–2.3)
MUCOUS THREADS #/AREA URNS LPF: PRESENT /LPF
NITRATE UR QL: POSITIVE
PH UR STRIP: 6 [PH] (ref 5–7)
POTASSIUM SERPL-SCNC: 3.1 MMOL/L (ref 3.4–5.3)
RBC #/AREA URNS AUTO: ABNORMAL /HPF
SODIUM SERPL-SCNC: 138 MMOL/L (ref 135–145)
SP GR UR STRIP: 1.02 (ref 1–1.03)
SQUAMOUS #/AREA URNS AUTO: ABNORMAL /LPF
UROBILINOGEN UR STRIP-ACNC: 0.2 E.U./DL
WBC #/AREA URNS AUTO: ABNORMAL /HPF

## 2023-12-15 PROCEDURE — 99204 OFFICE O/P NEW MOD 45 MIN: CPT | Performed by: INTERNAL MEDICINE

## 2023-12-15 PROCEDURE — 83036 HEMOGLOBIN GLYCOSYLATED A1C: CPT | Performed by: INTERNAL MEDICINE

## 2023-12-15 PROCEDURE — 80048 BASIC METABOLIC PNL TOTAL CA: CPT | Performed by: INTERNAL MEDICINE

## 2023-12-15 PROCEDURE — 87086 URINE CULTURE/COLONY COUNT: CPT | Performed by: INTERNAL MEDICINE

## 2023-12-15 PROCEDURE — 83735 ASSAY OF MAGNESIUM: CPT | Performed by: INTERNAL MEDICINE

## 2023-12-15 PROCEDURE — 36415 COLL VENOUS BLD VENIPUNCTURE: CPT | Performed by: INTERNAL MEDICINE

## 2023-12-15 PROCEDURE — 81001 URINALYSIS AUTO W/SCOPE: CPT | Performed by: INTERNAL MEDICINE

## 2023-12-15 RX ORDER — BLOOD-GLUCOSE SENSOR
1 EACH MISCELLANEOUS
COMMUNITY
Start: 2023-11-07

## 2023-12-15 RX ORDER — PROPRANOLOL HYDROCHLORIDE 20 MG/1
20 TABLET ORAL 2 TIMES DAILY
COMMUNITY
Start: 2023-10-09

## 2023-12-15 RX ORDER — LOSARTAN POTASSIUM 25 MG/1
25 TABLET ORAL DAILY
Qty: 90 TABLET | Refills: 1 | Status: SHIPPED | OUTPATIENT
Start: 2023-12-15

## 2023-12-15 RX ORDER — CLOMIPRAMINE HYDROCHLORIDE 25 MG/1
25 CAPSULE ORAL DAILY
COMMUNITY
Start: 2023-10-09

## 2023-12-15 RX ORDER — BUPRENORPHINE AND NALOXONE 8; 2 MG/1; MG/1
2 FILM, SOLUBLE BUCCAL; SUBLINGUAL DAILY
COMMUNITY

## 2023-12-15 RX ORDER — CLONIDINE HYDROCHLORIDE 0.2 MG/1
0.2 TABLET ORAL 3 TIMES DAILY
COMMUNITY
Start: 2023-12-15

## 2023-12-15 RX ORDER — SEMAGLUTIDE 1.34 MG/ML
0.5 INJECTION, SOLUTION SUBCUTANEOUS
COMMUNITY
Start: 2023-11-07

## 2023-12-15 RX ORDER — ZOLPIDEM TARTRATE 12.5 MG/1
12.5 TABLET, FILM COATED, EXTENDED RELEASE ORAL AT BEDTIME
COMMUNITY

## 2023-12-15 RX ORDER — BUSPIRONE HYDROCHLORIDE 10 MG/1
20 TABLET ORAL PRN
COMMUNITY
Start: 2022-09-22

## 2023-12-15 ASSESSMENT — PAIN SCALES - GENERAL: PAINLEVEL: NO PAIN (0)

## 2023-12-15 NOTE — PROGRESS NOTES
"  Assessment & Plan     Uncontrolled type 2 diabetes mellitus with hyperglycemia (H)  Just started seeing specialist.  Long-acting insulin was added.  Will avoid making drastic changes as the patient will be seeing endocrine next month.  - HEMOGLOBIN A1C    Hypokalemia  He is on hefty dose of potassium.  Will check a BMP today.  - Basic metabolic panel  (Ca, Cl, CO2, Creat, Gluc, K, Na, BUN)  - Magnesium    Essential hypertension  Unclear why the patient was taken off of angiotensin receptor blocker.  Will add low-dose losartan.  Return to clinic in 2 to 3 months for recheck.  - losartan (COZAAR) 25 MG tablet  Dispense: 90 tablet; Refill: 1  - Basic metabolic panel  (Ca, Cl, CO2, Creat, Gluc, K, Na, BUN)  - Magnesium    Dyslipidemia  On fibrate and statin.  Outside lipids are reviewed.    Mood disorder (H24)  [facility] multitude [email or web address]  I did not make any changes to    CATHIE (generalized anxiety disorder)  As above.    Uncomplicated opioid dependence (H)  He is on Suboxone and has been for several years.  Followed by specialist.    Screen for colon cancer  He has had a history of poor preps.  Currently working with [employer].    Urinary retention  Ongoing for some time.  Self caths every night.  Has a history of recurrent UTIs.  Feels like he may have a right now.  Will check a UA today.  Refer to urology.  Last urology note from Danae Taylor in 2021 is reviewed  - Adult Urology  Referral  - UA with Microscopic reflex to Culture - lab collect        52 minutes spent by me on the date of the encounter doing chart review, review of outside records, review of test results, interpretation of tests, patient visit, and documentation        BMI:   Estimated body mass index is 34.82 kg/m  as calculated from the following:    Height as of this encounter: 1.727 m (5' 8\").    Weight as of this encounter: 103.9 kg (229 lb).           Cristo Alves MD  [facility] " [facility] SENIA Bartlett is a 53 year old, presenting for the following health issues:  Establish Care    New to me as well as the clinic.  He was followed by [facility].  He is followed by [facility] as well.      DM:  Uncontrolled. He was recently referred to Dr. LESLIE Coleman at [facility].    Was started on Semglee recently at 30 U once a day.  He is also on Ozempic.    He is also on Jardiance 10mg  BS this AM was 220.      He will be seeing them in a month.      HTN:  On Clonidine.  Used to be on Losartan. Unclear why no longer on it.    HL:  On fibrate and statin.      MOOD:  Followed by [facility].  He is on clomipramine, Buspirone.  Also on high dose Quetiapine- which he says is for sleep.  These are prescribed by [facility].      Chronic back pain:  On Buprenorphine for low back.  Had opioid dependency post surgery.    He has been on Suboxone for several years.        Chronic urinary retention.  Saw Urology in 1/2021 at [facility]. No one since. Has bladder emptying issues.  Recurrent UTIs, takes to self cath every evening.          History of Present Illness       Diabetes:   He presents for follow up of diabetes.   He is checking home blood glucose with a continuous glucose monitor.   He checks blood glucose before meals, after meals, before and after meals and at bedtime.  Blood glucose is sometimes over 200 and never under 70. He is aware of hypoglycemia symptoms including dizziness and weakness.   He is concerned about blood sugar frequently over 200.    He is not experiencing numbness or burning in feet, excessive thirst, blurry vision, weight changes or redness, sores or blisters on feet. The patient has not had a diabetic eye exam in the last 12 months.          Hypertension: He presents for follow up of hypertension.  He does check blood pressure  regularly outside of the clinic. Outside blood pressures have been over 140/90. He does not follow  "a low salt diet.     Reason for visit:  To get a primary care doctor            Review of Systems         Objective    /82 (BP Location: Left arm, Patient Position: Chair, Cuff Size: Adult Large)   Pulse 69   Temp 97.8  F (36.6  C) (Temporal)   Resp 18   Ht 1.727 m (5' 8\")   Wt 103.9 kg (229 lb)   SpO2 96%   BMI 34.82 kg/m    Body mass index is 34.82 kg/m .  Physical Exam   GEN Sleepy appearing, slow speech, NAD  ENT MMM  CV RRR                      "

## 2023-12-15 NOTE — COMMUNITY RESOURCES LIST (ENGLISH)
12/15/2023   [facility] [employer]  N/A  For questions about this resource list or additional care needs, please contact your primary care clinic or care manager.  Phone: [phone]   Email: N/A   Address: [address] [place] [id number]   Hours: N/A        Financial Stability       Rent and mortgage payment assistance  1  [facility] Distance: 2.92 miles      In-Person, Phone/Virtual   [address] [place] [address]  Language: English, Tristanian  Hours: Mon - Fri 9:00 AM - 4:30 PM  Fees: Free   Phone: (478) 146-3422 Ext.113 Email: info@[facility].org Website: [email or web address]     2  [facility] ([employer]) - [employer] - Homelessness Prevention - Rent and mortgage payment assistance Distance: 7.42 miles      In-Person, Phone/Virtual   [address] [place] [address]  Language: English, Salvadorean, Tristanian  Hours: Mon 12:00 AM - 7:00 PM , Tue 10:00 AM - 3:00 PM , Wed - Thu 10:00 AM - 2:30 PM  Fees: Free   Phone: (613) 436-1112 Email: ica@[employer].org Website: http://[facility].[employer] [employer].org          Important Numbers & Websites       Emergency Services   911  [facility] Services   311  Poison Control   (378) 144-6313  Suicide Prevention Lifeline   (885) 924-2538 (TALK)  Child Abuse Hotline   (624) 460-8260 (4-A-Child)  Sexual Assault Hotline   (205) 739-8799 (HOPE)  National Runaway Safeline   (449) 131-7045 (RUNAWAY)  All-Options Talkline   (959) 592-3342  Substance Abuse Referral   (323) 518-1543 (HELP)

## 2023-12-15 NOTE — PATIENT INSTRUCTIONS
LABS today    START:  LOSARTAN once a day    SEE me in 2-3 months    BLADDER/URINE:  We placed a referral to UROLOGY.  Their office will call you to schedule

## 2023-12-15 NOTE — LETTER
December 18, 2023      Dhruv Shaikh  [address] [place] [id number]        Dear ,    We are writing to inform you of your test results.    Diabetes remains uncontrolled.  Please see your diabetes clinic.  Electrolytes are relatively stable.  Potassium and Magnesium remain low, but no lower than they have been previously.  Continue your potassium as prescribed.       Resulted Orders   HEMOGLOBIN A1C   Result Value Ref Range    Hemoglobin A1C 9.2 (H) 0.0 - 5.6 %    Narrative    Result confirmed by repeat test.       Basic metabolic panel  (Ca, Cl, CO2, Creat, Gluc, K, Na, BUN)   Result Value Ref Range    Sodium 138 135 - 145 mmol/L      Comment:      Reference intervals for this test were updated on 09/26/2023 to more accurately reflect our healthy population. There may be differences in the flagging of prior results with similar values performed with this method. Interpretation of those prior results can be made in the context of the updated reference intervals.     Potassium 3.1 (L) 3.4 - 5.3 mmol/L    Chloride 97 (L) 98 - 107 mmol/L    Carbon Dioxide (CO2) 28 22 - 29 mmol/L    Anion Gap 13 7 - 15 mmol/L    Urea Nitrogen 11.6 6.0 - 20.0 mg/dL    Creatinine 0.93 0.67 - 1.17 mg/dL    GFR Estimate >90 >60 mL/min/1.73m2    Calcium 9.7 8.6 - 10.0 mg/dL    Glucose 233 (H) 70 - 99 mg/dL   Magnesium   Result Value Ref Range    Magnesium 1.5 (L) 1.7 - 2.3 mg/dL   UA with Microscopic reflex to Culture - lab collect   Result Value Ref Range    Color Urine Yellow Colorless, Straw, Light Yellow, Yellow    Appearance Urine Slightly Cloudy (A) Clear    Glucose Urine >=1000 (A) Negative mg/dL    Bilirubin Urine Negative Negative    Ketones Urine Negative Negative mg/dL    Specific Gravity Urine 1.020 1.003 - 1.035    Blood Urine Negative Negative    pH Urine 6.0 5.0 - 7.0    Protein Albumin Urine Negative Negative mg/dL    Urobilinogen Urine 0.2 0.2, 1.0 E.U./dL    Nitrite Urine Positive (A)  Negative    Leukocyte Esterase Urine Negative Negative   Urine Microscopic Exam   Result Value Ref Range    Bacteria Urine Many (A) None Seen /HPF    RBC Urine None Seen 0-2 /HPF /HPF    WBC Urine 25-50 (A) 0-5 /HPF /HPF    Squamous Epithelials Urine Few (A) None Seen /LPF    Mucus Urine Present (A) None Seen /LPF   Urine Culture   Result Value Ref Range    Culture 50,000-100,000 CFU/mL Mixture of Urogenital Tracy        If you have any questions or concerns, please call the clinic at the number listed above.       Sincerely,      Cristo Alves MD

## 2023-12-16 LAB — BACTERIA UR CULT: NORMAL

## 2023-12-18 ENCOUNTER — TELEPHONE (OUTPATIENT)
Dept: UROLOGY | Facility: CLINIC | Age: 53
End: 2023-12-18
Payer: COMMERCIAL

## 2023-12-18 NOTE — TELEPHONE ENCOUNTER
[facility] Call Center    Phone Message    May a detailed message be left on voicemail: yes     Reason for Call: Appointment Intake    Referring Provider Name:     Cristo Alves MD     Diagnosis and/or Symptoms:     incomplete bladder emptying.  self caths every night.  recurrent UTIs       Patient is being referred for retention.Sending encounter per guidelines. Please review and follow-up with patient for scheduling.      Action Taken: Message routed to:  Other: [facility] Urology    Travel Screening: Not Applicable

## 2024-05-06 DIAGNOSIS — E87.6 HYPOKALEMIA: ICD-10-CM

## 2024-05-06 RX ORDER — POTASSIUM CHLORIDE 1500 MG/1
40 TABLET, EXTENDED RELEASE ORAL 2 TIMES DAILY
Qty: 120 TABLET | Refills: 3 | Status: SHIPPED | OUTPATIENT
Start: 2024-05-06

## 2024-05-06 NOTE — TELEPHONE ENCOUNTER
[facility] Refill Guideline reviewed.  Medication meets criteria for refill.    Liliam Estrada RN

## 2024-05-06 NOTE — TELEPHONE ENCOUNTER
Last Office Visit: 8/7/23 ([facility])  Next Office Visit: TBD  Last Fill Date: 4/11/24  Tiny Lynch LPN Cardiology   5/6/2024 2:09 PM

## 2024-10-06 ENCOUNTER — NURSE TRIAGE (OUTPATIENT)
Dept: NURSING | Facility: CLINIC | Age: 54
End: 2024-10-06
Payer: COMMERCIAL
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
## 2024-10-08 ENCOUNTER — HOSPITAL ENCOUNTER (INPATIENT)
Facility: CLINIC | Age: 54
LOS: 2 days | Discharge: HOME | End: 2024-10-10
Attending: EMERGENCY MEDICINE | Admitting: INTERNAL MEDICINE
Payer: COMMERCIAL

## 2024-10-08 ENCOUNTER — APPOINTMENT (OUTPATIENT)
Dept: GENERAL RADIOLOGY | Facility: CLINIC | Age: 54
End: 2024-10-08
Attending: INTERNAL MEDICINE
Payer: COMMERCIAL

## 2024-10-08 ENCOUNTER — APPOINTMENT (OUTPATIENT)
Dept: ULTRASOUND IMAGING | Facility: CLINIC | Age: 54
End: 2024-10-08
Attending: INTERNAL MEDICINE
Payer: COMMERCIAL

## 2024-10-08 DIAGNOSIS — E87.6 HYPOKALEMIA: ICD-10-CM

## 2024-10-08 DIAGNOSIS — E11.65 HYPERGLYCEMIA DUE TO DIABETES MELLITUS (H): ICD-10-CM

## 2024-10-08 DIAGNOSIS — R60.9 EDEMA, UNSPECIFIED TYPE: ICD-10-CM

## 2024-10-08 DIAGNOSIS — I10 BENIGN ESSENTIAL HYPERTENSION: Primary | ICD-10-CM

## 2024-10-08 DIAGNOSIS — L03.115 CELLULITIS OF RIGHT LOWER LEG: ICD-10-CM

## 2024-10-08 DIAGNOSIS — S80.811A ABRASION OF RIGHT LOWER EXTREMITY, INITIAL ENCOUNTER: ICD-10-CM

## 2024-10-08 LAB
ALBUMIN SERPL BCG-MCNC: 3.8 G/DL (ref 3.5–5.2)
ALP SERPL-CCNC: 118 U/L (ref 40–150)
ALT SERPL W P-5'-P-CCNC: 68 U/L (ref 0–70)
ANION GAP SERPL CALCULATED.3IONS-SCNC: 9 MMOL/L (ref 7–15)
AST SERPL W P-5'-P-CCNC: 70 U/L (ref 0–45)
BASOPHILS # BLD AUTO: 0.1 10E3/UL (ref 0–0.2)
BASOPHILS NFR BLD AUTO: 1 %
BILIRUB SERPL-MCNC: 0.6 MG/DL
BUN SERPL-MCNC: 17 MG/DL (ref 6–20)
CALCIUM SERPL-MCNC: 9.8 MG/DL (ref 8.8–10.4)
CHLORIDE SERPL-SCNC: 89 MMOL/L (ref 98–107)
CREAT SERPL-MCNC: 1 MG/DL (ref 0.67–1.17)
EGFRCR SERPLBLD CKD-EPI 2021: 89 ML/MIN/1.73M2
EOSINOPHIL # BLD AUTO: 0.4 10E3/UL (ref 0–0.7)
EOSINOPHIL NFR BLD AUTO: 5 %
ERYTHROCYTE [DISTWIDTH] IN BLOOD BY AUTOMATED COUNT: 12.9 % (ref 10–15)
EST. AVERAGE GLUCOSE BLD GHB EST-MCNC: 137 MG/DL
GLUCOSE BLDC GLUCOMTR-MCNC: 165 MG/DL (ref 70–99)
GLUCOSE BLDC GLUCOMTR-MCNC: 171 MG/DL (ref 70–99)
GLUCOSE SERPL-MCNC: 173 MG/DL (ref 70–99)
HBA1C MFR BLD: 6.4 %
HCO3 SERPL-SCNC: 38 MMOL/L (ref 22–29)
HCT VFR BLD AUTO: 32.7 % (ref 40–53)
HGB BLD-MCNC: 11.2 G/DL (ref 13.3–17.7)
IMM GRANULOCYTES # BLD: 0.1 10E3/UL
IMM GRANULOCYTES NFR BLD: 1 %
LACTATE SERPL-SCNC: 1.1 MMOL/L (ref 0.7–2)
LYMPHOCYTES # BLD AUTO: 1.4 10E3/UL (ref 0.8–5.3)
LYMPHOCYTES NFR BLD AUTO: 19 %
MCH RBC QN AUTO: 28.9 PG (ref 26.5–33)
MCHC RBC AUTO-ENTMCNC: 34.3 G/DL (ref 31.5–36.5)
MCV RBC AUTO: 85 FL (ref 78–100)
MONOCYTES # BLD AUTO: 0.9 10E3/UL (ref 0–1.3)
MONOCYTES NFR BLD AUTO: 12 %
NEUTROPHILS # BLD AUTO: 4.6 10E3/UL (ref 1.6–8.3)
NEUTROPHILS NFR BLD AUTO: 61 %
NRBC # BLD AUTO: 0 10E3/UL
NRBC BLD AUTO-RTO: 0 /100
PLATELET # BLD AUTO: 368 10E3/UL (ref 150–450)
POTASSIUM SERPL-SCNC: 2.9 MMOL/L (ref 3.4–5.3)
POTASSIUM SERPL-SCNC: 2.9 MMOL/L (ref 3.4–5.3)
PROT SERPL-MCNC: 7.1 G/DL (ref 6.4–8.3)
RBC # BLD AUTO: 3.87 10E6/UL (ref 4.4–5.9)
SODIUM SERPL-SCNC: 136 MMOL/L (ref 135–145)
WBC # BLD AUTO: 7.5 10E3/UL (ref 4–11)

## 2024-10-08 PROCEDURE — 258N000003 HC RX IP 258 OP 636: Performed by: EMERGENCY MEDICINE

## 2024-10-08 PROCEDURE — 96365 THER/PROPH/DIAG IV INF INIT: CPT

## 2024-10-08 PROCEDURE — 83036 HEMOGLOBIN GLYCOSYLATED A1C: CPT | Performed by: INTERNAL MEDICINE

## 2024-10-08 PROCEDURE — 99223 1ST HOSP IP/OBS HIGH 75: CPT | Performed by: INTERNAL MEDICINE

## 2024-10-08 PROCEDURE — 120N000001 HC R&B MED SURG/OB

## 2024-10-08 PROCEDURE — 83605 ASSAY OF LACTIC ACID: CPT | Performed by: EMERGENCY MEDICINE

## 2024-10-08 PROCEDURE — 87040 BLOOD CULTURE FOR BACTERIA: CPT | Performed by: EMERGENCY MEDICINE

## 2024-10-08 PROCEDURE — 74019 RADEX ABDOMEN 2 VIEWS: CPT

## 2024-10-08 PROCEDURE — 250N000011 HC RX IP 250 OP 636: Performed by: INTERNAL MEDICINE

## 2024-10-08 PROCEDURE — 85025 COMPLETE CBC W/AUTO DIFF WBC: CPT | Performed by: EMERGENCY MEDICINE

## 2024-10-08 PROCEDURE — 36415 COLL VENOUS BLD VENIPUNCTURE: CPT | Performed by: EMERGENCY MEDICINE

## 2024-10-08 PROCEDURE — 250N000013 HC RX MED GY IP 250 OP 250 PS 637: Performed by: EMERGENCY MEDICINE

## 2024-10-08 PROCEDURE — 250N000011 HC RX IP 250 OP 636: Performed by: EMERGENCY MEDICINE

## 2024-10-08 PROCEDURE — 93971 EXTREMITY STUDY: CPT | Mod: RT

## 2024-10-08 PROCEDURE — 36415 COLL VENOUS BLD VENIPUNCTURE: CPT | Performed by: INTERNAL MEDICINE

## 2024-10-08 PROCEDURE — 96375 TX/PRO/DX INJ NEW DRUG ADDON: CPT

## 2024-10-08 PROCEDURE — 250N000013 HC RX MED GY IP 250 OP 250 PS 637: Performed by: INTERNAL MEDICINE

## 2024-10-08 PROCEDURE — 250N000012 HC RX MED GY IP 250 OP 636 PS 637: Performed by: INTERNAL MEDICINE

## 2024-10-08 PROCEDURE — 99285 EMERGENCY DEPT VISIT HI MDM: CPT | Mod: 25

## 2024-10-08 PROCEDURE — 80051 ELECTROLYTE PANEL: CPT | Performed by: EMERGENCY MEDICINE

## 2024-10-08 PROCEDURE — 84132 ASSAY OF SERUM POTASSIUM: CPT | Performed by: INTERNAL MEDICINE

## 2024-10-08 RX ORDER — DOXYCYCLINE 100 MG/1
100 CAPSULE ORAL 2 TIMES DAILY
Status: ON HOLD | COMMUNITY
Start: 2023-04-27 | End: 2024-10-10

## 2024-10-08 RX ORDER — DEXTROSE MONOHYDRATE 25 G/50ML
25-50 INJECTION, SOLUTION INTRAVENOUS
Status: DISCONTINUED | OUTPATIENT
Start: 2024-10-08 | End: 2024-10-10 | Stop reason: HOSPADM

## 2024-10-08 RX ORDER — QUETIAPINE FUMARATE 400 MG/1
400 TABLET, FILM COATED ORAL AT BEDTIME
COMMUNITY
Start: 2024-08-15

## 2024-10-08 RX ORDER — BISACODYL 10 MG
10 SUPPOSITORY, RECTAL RECTAL DAILY PRN
Status: DISCONTINUED | OUTPATIENT
Start: 2024-10-08 | End: 2024-10-10 | Stop reason: HOSPADM

## 2024-10-08 RX ORDER — ATORVASTATIN CALCIUM 20 MG/1
20 TABLET, FILM COATED ORAL DAILY
Status: DISCONTINUED | OUTPATIENT
Start: 2024-10-08 | End: 2024-10-10 | Stop reason: HOSPADM

## 2024-10-08 RX ORDER — PLECANATIDE 3 MG/1
3 TABLET ORAL DAILY
COMMUNITY

## 2024-10-08 RX ORDER — IBUPROFEN 800 MG/1
800 TABLET, FILM COATED ORAL EVERY 6 HOURS PRN
COMMUNITY
Start: 2024-09-08

## 2024-10-08 RX ORDER — SEMAGLUTIDE 2.68 MG/ML
2 INJECTION, SOLUTION SUBCUTANEOUS
COMMUNITY
Start: 2024-08-13

## 2024-10-08 RX ORDER — GABAPENTIN 600 MG/1
600 TABLET ORAL 3 TIMES DAILY
COMMUNITY
Start: 2024-08-15

## 2024-10-08 RX ORDER — BUSPIRONE HYDROCHLORIDE 30 MG/1
30 TABLET ORAL 2 TIMES DAILY
COMMUNITY
Start: 2024-08-14

## 2024-10-08 RX ORDER — METFORMIN HYDROCHLORIDE 500 MG/1
2 TABLET, EXTENDED RELEASE ORAL
COMMUNITY
Start: 2024-10-02

## 2024-10-08 RX ORDER — CLOMIPRAMINE HYDROCHLORIDE 50 MG/1
50 CAPSULE ORAL AT BEDTIME
COMMUNITY
Start: 2024-08-22

## 2024-10-08 RX ORDER — CEFAZOLIN SODIUM 2 G/100ML
2 INJECTION, SOLUTION INTRAVENOUS EVERY 8 HOURS
Status: DISCONTINUED | OUTPATIENT
Start: 2024-10-08 | End: 2024-10-10 | Stop reason: HOSPADM

## 2024-10-08 RX ORDER — BUPRENORPHINE AND NALOXONE 8; 2 MG/1; MG/1
1 FILM, SOLUBLE BUCCAL; SUBLINGUAL 2 TIMES DAILY
Status: DISCONTINUED | OUTPATIENT
Start: 2024-10-08 | End: 2024-10-10 | Stop reason: HOSPADM

## 2024-10-08 RX ORDER — PROCHLORPERAZINE MALEATE 10 MG
10 TABLET ORAL EVERY 6 HOURS PRN
Status: DISCONTINUED | OUTPATIENT
Start: 2024-10-08 | End: 2024-10-10 | Stop reason: HOSPADM

## 2024-10-08 RX ORDER — ONDANSETRON 8 MG/1
8 TABLET, FILM COATED ORAL EVERY 8 HOURS PRN
COMMUNITY
Start: 2022-11-30

## 2024-10-08 RX ORDER — ACETAMINOPHEN 325 MG/1
650 TABLET ORAL EVERY 4 HOURS PRN
Status: DISCONTINUED | OUTPATIENT
Start: 2024-10-08 | End: 2024-10-10 | Stop reason: HOSPADM

## 2024-10-08 RX ORDER — POLYETHYLENE GLYCOL 3350 17 G/17G
17 POWDER, FOR SOLUTION ORAL DAILY
Status: DISCONTINUED | OUTPATIENT
Start: 2024-10-09 | End: 2024-10-10 | Stop reason: HOSPADM

## 2024-10-08 RX ORDER — CLOMIPRAMINE HYDROCHLORIDE 50 MG/1
50 CAPSULE ORAL AT BEDTIME
Status: DISCONTINUED | OUTPATIENT
Start: 2024-10-08 | End: 2024-10-10 | Stop reason: HOSPADM

## 2024-10-08 RX ORDER — FEXOFENADINE HCL 180 MG/1
180 TABLET ORAL DAILY PRN
Status: DISCONTINUED | OUTPATIENT
Start: 2024-10-08 | End: 2024-10-10 | Stop reason: HOSPADM

## 2024-10-08 RX ORDER — HYDROXYZINE HYDROCHLORIDE 25 MG/1
50 TABLET, FILM COATED ORAL 3 TIMES DAILY PRN
Status: DISCONTINUED | OUTPATIENT
Start: 2024-10-08 | End: 2024-10-10 | Stop reason: HOSPADM

## 2024-10-08 RX ORDER — METOLAZONE 5 MG/1
5 TABLET ORAL EVERY MORNING
Status: DISCONTINUED | OUTPATIENT
Start: 2024-10-09 | End: 2024-10-10 | Stop reason: HOSPADM

## 2024-10-08 RX ORDER — METFORMIN HYDROCHLORIDE 500 MG/1
1000 TABLET, EXTENDED RELEASE ORAL
Status: DISCONTINUED | OUTPATIENT
Start: 2024-10-09 | End: 2024-10-10 | Stop reason: HOSPADM

## 2024-10-08 RX ORDER — BUSPIRONE HYDROCHLORIDE 15 MG/1
30 TABLET ORAL 2 TIMES DAILY
Status: DISCONTINUED | OUTPATIENT
Start: 2024-10-08 | End: 2024-10-10 | Stop reason: HOSPADM

## 2024-10-08 RX ORDER — ZOLPIDEM TARTRATE 5 MG/1
5 TABLET ORAL
Status: DISCONTINUED | OUTPATIENT
Start: 2024-10-08 | End: 2024-10-10 | Stop reason: HOSPADM

## 2024-10-08 RX ORDER — ONDANSETRON 4 MG/1
4 TABLET, ORALLY DISINTEGRATING ORAL EVERY 6 HOURS PRN
Status: DISCONTINUED | OUTPATIENT
Start: 2024-10-08 | End: 2024-10-10 | Stop reason: HOSPADM

## 2024-10-08 RX ORDER — SODIUM PHOSPHATE,MONO-DIBASIC 19G-7G/118
1 ENEMA (ML) RECTAL DAILY PRN
Status: DISCONTINUED | OUTPATIENT
Start: 2024-10-08 | End: 2024-10-10 | Stop reason: HOSPADM

## 2024-10-08 RX ORDER — NICOTINE POLACRILEX 4 MG
15-30 LOZENGE BUCCAL
Status: DISCONTINUED | OUTPATIENT
Start: 2024-10-08 | End: 2024-10-10 | Stop reason: HOSPADM

## 2024-10-08 RX ORDER — FENOFIBRATE 48 MG/1
48 TABLET, COATED ORAL DAILY
COMMUNITY
Start: 2024-01-02

## 2024-10-08 RX ORDER — POLYETHYLENE GLYCOL 3350 17 G/17G
17 POWDER, FOR SOLUTION ORAL 2 TIMES DAILY PRN
Status: DISCONTINUED | OUTPATIENT
Start: 2024-10-08 | End: 2024-10-10 | Stop reason: HOSPADM

## 2024-10-08 RX ORDER — PRAMIPEXOLE DIHYDROCHLORIDE 1.5 MG/1
1.5 TABLET ORAL 2 TIMES DAILY
COMMUNITY

## 2024-10-08 RX ORDER — METOLAZONE 5 MG/1
5 TABLET ORAL EVERY MORNING
Status: ON HOLD | COMMUNITY
End: 2024-10-10

## 2024-10-08 RX ORDER — LOSARTAN POTASSIUM 25 MG/1
25 TABLET ORAL DAILY
Status: DISCONTINUED | OUTPATIENT
Start: 2024-10-08 | End: 2024-10-10 | Stop reason: HOSPADM

## 2024-10-08 RX ORDER — PROCHLORPERAZINE 25 MG
25 SUPPOSITORY, RECTAL RECTAL EVERY 12 HOURS PRN
Status: DISCONTINUED | OUTPATIENT
Start: 2024-10-08 | End: 2024-10-10 | Stop reason: HOSPADM

## 2024-10-08 RX ORDER — FENOFIBRATE 54 MG/1
54 TABLET ORAL DAILY
Status: DISCONTINUED | OUTPATIENT
Start: 2024-10-08 | End: 2024-10-10 | Stop reason: HOSPADM

## 2024-10-08 RX ORDER — PRAMIPEXOLE DIHYDROCHLORIDE 0.5 MG/1
1.5 TABLET ORAL 2 TIMES DAILY
Status: DISCONTINUED | OUTPATIENT
Start: 2024-10-08 | End: 2024-10-10 | Stop reason: HOSPADM

## 2024-10-08 RX ORDER — ACETAMINOPHEN 650 MG/1
650 SUPPOSITORY RECTAL EVERY 4 HOURS PRN
Status: DISCONTINUED | OUTPATIENT
Start: 2024-10-08 | End: 2024-10-10 | Stop reason: HOSPADM

## 2024-10-08 RX ORDER — AMOXICILLIN 250 MG
1 CAPSULE ORAL 2 TIMES DAILY PRN
Status: DISCONTINUED | OUTPATIENT
Start: 2024-10-08 | End: 2024-10-10 | Stop reason: HOSPADM

## 2024-10-08 RX ORDER — PROPRANOLOL HCL 20 MG
20 TABLET ORAL 2 TIMES DAILY
Status: DISCONTINUED | OUTPATIENT
Start: 2024-10-08 | End: 2024-10-10

## 2024-10-08 RX ORDER — GABAPENTIN 600 MG/1
600 TABLET ORAL 3 TIMES DAILY
Status: DISCONTINUED | OUTPATIENT
Start: 2024-10-08 | End: 2024-10-10 | Stop reason: HOSPADM

## 2024-10-08 RX ORDER — AMOXICILLIN 250 MG
2 CAPSULE ORAL 2 TIMES DAILY PRN
Status: DISCONTINUED | OUTPATIENT
Start: 2024-10-08 | End: 2024-10-10 | Stop reason: HOSPADM

## 2024-10-08 RX ORDER — MAGNESIUM OXIDE 400 MG/1
800 TABLET ORAL DAILY
Status: DISCONTINUED | OUTPATIENT
Start: 2024-10-09 | End: 2024-10-10 | Stop reason: HOSPADM

## 2024-10-08 RX ORDER — EMPAGLIFLOZIN 10 MG/1
1 TABLET, FILM COATED ORAL DAILY
COMMUNITY
Start: 2024-10-02

## 2024-10-08 RX ORDER — MULTIVITAMIN WITH IRON
250 TABLET ORAL DAILY
Status: DISCONTINUED | OUTPATIENT
Start: 2024-10-08 | End: 2024-10-08 | Stop reason: DRUGHIGH

## 2024-10-08 RX ORDER — POTASSIUM CHLORIDE 20MEQ/15ML
20 LIQUID (ML) ORAL ONCE
Status: COMPLETED | OUTPATIENT
Start: 2024-10-09 | End: 2024-10-09

## 2024-10-08 RX ORDER — MAGNESIUM OXIDE 400 MG/1
800 TABLET ORAL DAILY
COMMUNITY

## 2024-10-08 RX ORDER — BUPRENORPHINE AND NALOXONE 8; 2 MG/1; MG/1
2 FILM, SOLUBLE BUCCAL; SUBLINGUAL DAILY
Status: DISCONTINUED | OUTPATIENT
Start: 2024-10-08 | End: 2024-10-08

## 2024-10-08 RX ORDER — INSULIN GLARGINE-YFGN 100 [IU]/ML
50 INJECTION, SOLUTION SUBCUTANEOUS AT BEDTIME
COMMUNITY

## 2024-10-08 RX ORDER — FUROSEMIDE 20 MG/1
60 TABLET ORAL DAILY
Status: ON HOLD | COMMUNITY
End: 2024-10-10

## 2024-10-08 RX ORDER — POTASSIUM CHLORIDE 20MEQ/15ML
40 LIQUID (ML) ORAL ONCE
Status: COMPLETED | OUTPATIENT
Start: 2024-10-09 | End: 2024-10-09

## 2024-10-08 RX ORDER — AMOXICILLIN 250 MG
1-2 CAPSULE ORAL 2 TIMES DAILY
Status: DISCONTINUED | OUTPATIENT
Start: 2024-10-08 | End: 2024-10-10 | Stop reason: HOSPADM

## 2024-10-08 RX ORDER — POTASSIUM CHLORIDE 1.5 G/1.58G
40 POWDER, FOR SOLUTION ORAL ONCE
Status: COMPLETED | OUTPATIENT
Start: 2024-10-08 | End: 2024-10-08

## 2024-10-08 RX ORDER — CLONIDINE HYDROCHLORIDE 0.1 MG/1
0.2 TABLET ORAL 3 TIMES DAILY
Status: DISCONTINUED | OUTPATIENT
Start: 2024-10-08 | End: 2024-10-10

## 2024-10-08 RX ORDER — BUPRENORPHINE HYDROCHLORIDE AND NALOXONE HYDROCHLORIDE DIHYDRATE 8; 2 MG/1; MG/1
1 TABLET SUBLINGUAL 2 TIMES DAILY
Status: DISCONTINUED | OUTPATIENT
Start: 2024-10-08 | End: 2024-10-08

## 2024-10-08 RX ORDER — AMOXICILLIN 250 MG
1-2 CAPSULE ORAL 2 TIMES DAILY PRN
Status: DISCONTINUED | OUTPATIENT
Start: 2024-10-08 | End: 2024-10-08

## 2024-10-08 RX ORDER — CALCIUM CARBONATE 500 MG/1
1000 TABLET, CHEWABLE ORAL 4 TIMES DAILY PRN
Status: DISCONTINUED | OUTPATIENT
Start: 2024-10-08 | End: 2024-10-10 | Stop reason: HOSPADM

## 2024-10-08 RX ORDER — QUETIAPINE FUMARATE 200 MG/1
400 TABLET, FILM COATED ORAL AT BEDTIME
Status: DISCONTINUED | OUTPATIENT
Start: 2024-10-08 | End: 2024-10-10 | Stop reason: HOSPADM

## 2024-10-08 RX ORDER — ASPIRIN 81 MG/1
81 TABLET ORAL DAILY
Status: DISCONTINUED | OUTPATIENT
Start: 2024-10-08 | End: 2024-10-10 | Stop reason: HOSPADM

## 2024-10-08 RX ORDER — ONDANSETRON 2 MG/ML
4 INJECTION INTRAMUSCULAR; INTRAVENOUS EVERY 6 HOURS PRN
Status: DISCONTINUED | OUTPATIENT
Start: 2024-10-08 | End: 2024-10-10 | Stop reason: HOSPADM

## 2024-10-08 RX ORDER — PIPERACILLIN SODIUM, TAZOBACTAM SODIUM 4; .5 G/20ML; G/20ML
4.5 INJECTION, POWDER, LYOPHILIZED, FOR SOLUTION INTRAVENOUS ONCE
Status: COMPLETED | OUTPATIENT
Start: 2024-10-08 | End: 2024-10-08

## 2024-10-08 RX ORDER — POLYETHYLENE GLYCOL 3350 17 G/17G
17 POWDER, FOR SOLUTION ORAL DAILY PRN
Status: DISCONTINUED | OUTPATIENT
Start: 2024-10-08 | End: 2024-10-08

## 2024-10-08 RX ORDER — LIDOCAINE 40 MG/G
CREAM TOPICAL
Status: DISCONTINUED | OUTPATIENT
Start: 2024-10-08 | End: 2024-10-10 | Stop reason: HOSPADM

## 2024-10-08 RX ORDER — FENOFIBRATE 54 MG/1
54 TABLET ORAL DAILY
Status: DISCONTINUED | OUTPATIENT
Start: 2024-10-08 | End: 2024-10-08

## 2024-10-08 RX ORDER — ALBUTEROL SULFATE 90 UG/1
1-2 INHALANT RESPIRATORY (INHALATION) EVERY 4 HOURS PRN
Status: DISCONTINUED | OUTPATIENT
Start: 2024-10-08 | End: 2024-10-10 | Stop reason: HOSPADM

## 2024-10-08 RX ORDER — HYDROXYZINE PAMOATE 50 MG/1
50 CAPSULE ORAL 3 TIMES DAILY PRN
COMMUNITY
Start: 2024-08-15

## 2024-10-08 RX ORDER — MULTIPLE VITAMINS W/ MINERALS TAB 9MG-400MCG
1 TAB ORAL DAILY
Status: DISCONTINUED | OUTPATIENT
Start: 2024-10-09 | End: 2024-10-10 | Stop reason: HOSPADM

## 2024-10-08 RX ADMIN — CLONIDINE HYDROCHLORIDE 0.2 MG: 0.1 TABLET ORAL at 20:28

## 2024-10-08 RX ADMIN — PRAMIPEXOLE DIHYDROCHLORIDE 1.5 MG: 0.5 TABLET ORAL at 20:28

## 2024-10-08 RX ADMIN — GABAPENTIN 600 MG: 600 TABLET, FILM COATED ORAL at 18:34

## 2024-10-08 RX ADMIN — CLOMIPRAMINE HYDROCHLORIDE 50 MG: 50 CAPSULE ORAL at 21:12

## 2024-10-08 RX ADMIN — CEFAZOLIN SODIUM 2 G: 2 INJECTION, SOLUTION INTRAVENOUS at 18:36

## 2024-10-08 RX ADMIN — ACETAMINOPHEN 650 MG: 325 TABLET ORAL at 20:27

## 2024-10-08 RX ADMIN — INSULIN ASPART 1 UNITS: 100 INJECTION, SOLUTION INTRAVENOUS; SUBCUTANEOUS at 18:40

## 2024-10-08 RX ADMIN — ASPIRIN 81 MG: 81 TABLET, COATED ORAL at 18:30

## 2024-10-08 RX ADMIN — PIPERACILLIN AND TAZOBACTAM 4.5 G: 4; .5 INJECTION, POWDER, FOR SOLUTION INTRAVENOUS at 13:03

## 2024-10-08 RX ADMIN — ATORVASTATIN CALCIUM 20 MG: 20 TABLET, FILM COATED ORAL at 18:30

## 2024-10-08 RX ADMIN — VANCOMYCIN HYDROCHLORIDE 2500 MG: 10 INJECTION, POWDER, LYOPHILIZED, FOR SOLUTION INTRAVENOUS at 12:14

## 2024-10-08 RX ADMIN — FENOFIBRATE 54 MG: 54 TABLET ORAL at 18:30

## 2024-10-08 RX ADMIN — ZOLPIDEM TARTRATE 5 MG: 5 TABLET ORAL at 21:12

## 2024-10-08 RX ADMIN — BUPRENORPHINE AND NALOXONE 1 FILM: 8; 2 FILM BUCCAL; SUBLINGUAL at 20:27

## 2024-10-08 RX ADMIN — LOSARTAN POTASSIUM 25 MG: 25 TABLET, FILM COATED ORAL at 18:30

## 2024-10-08 RX ADMIN — POTASSIUM CHLORIDE 40 MEQ: 1.5 POWDER, FOR SOLUTION ORAL at 14:02

## 2024-10-08 RX ADMIN — SENNOSIDES AND DOCUSATE SODIUM 2 TABLET: 50; 8.6 TABLET ORAL at 18:30

## 2024-10-08 RX ADMIN — BUSPIRONE HYDROCHLORIDE 30 MG: 15 TABLET ORAL at 20:28

## 2024-10-08 RX ADMIN — SENNOSIDES AND DOCUSATE SODIUM 2 TABLET: 50; 8.6 TABLET ORAL at 20:33

## 2024-10-08 RX ADMIN — GABAPENTIN 600 MG: 600 TABLET, FILM COATED ORAL at 21:12

## 2024-10-08 RX ADMIN — QUETIAPINE FUMARATE 400 MG: 200 TABLET ORAL at 21:13

## 2024-10-08 RX ADMIN — INSULIN GLARGINE 50 UNITS: 100 INJECTION, SOLUTION SUBCUTANEOUS at 21:15

## 2024-10-08 ASSESSMENT — ACTIVITIES OF DAILY LIVING (ADL)
ADLS_ACUITY_SCORE: 35
ADLS_ACUITY_SCORE: 32
ADLS_ACUITY_SCORE: 37
ADLS_ACUITY_SCORE: 35
ADLS_ACUITY_SCORE: 38
ADLS_ACUITY_SCORE: 35
ADLS_ACUITY_SCORE: 35
ADLS_ACUITY_SCORE: 38
ADLS_ACUITY_SCORE: 35
ADLS_ACUITY_SCORE: 35
ADLS_ACUITY_SCORE: 38
ADLS_ACUITY_SCORE: 30
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
## 2024-10-08 NOTE — PROGRESS NOTES
RECEIVING UNIT ED HANDOFF REVIEW    ED Nurse Handoff Report was reviewed by: Dora Marion RN on October 8, 2024 at 4:19 PM

## 2024-10-08 NOTE — PHARMACY-ADMISSION MEDICATION HISTORY
Pharmacist Admission Medication History    Admission medication history is complete. The information provided in this note is only as accurate as the sources available at the time of the update.    Information Source(s): Patient and CareEverywhere/SureScripts via in-person    Pertinent Information: Pt has run out of refills of these: tricor, losartan. Working on finding a new provider as his insurance coverage has changed.  Pt finds KCl hard to swallow, so doesn't always take full dose. Aspirin has been on hold since surgery.  Pt says he stopped Lipitor because he thought it might have caused him itching.     Changes made to PTA medication list:  Added: trulance, zofran, metolazone, semglee, motrin, hydroxyzine, lasix, doxycycline, jardiance  Deleted: flomax  Changed: suboxone, buspar, clomipramine, tricor, mg, metformin, ozempic, mirapex, seroquel    Allergies reviewed with patient and updates made in EHR: yes      Medication History Completed By: Maciel Hernandez RPH 10/8/2024 4:49 PM    PTA Med List   Medication Sig Last Dose    albuterol (PROAIR HFA/PROVENTIL HFA/VENTOLIN HFA) 108 (90 BASE) MCG/ACT Inhaler Inhale 1-2 puffs into the lungs every 4 hours as needed for shortness of breath / dyspnea or wheezing Unknown    ASPIRIN EC PO Take 81 mg by mouth daily week ago?    ATORVASTATIN CALCIUM PO Take 20 mg by mouth daily More than a month at hold for possible itching    buprenorphine HCl-naloxone HCl (SUBOXONE) 8-2 MG per film Place 1 Film under the tongue 2 times daily. 10/8/2024 at am    busPIRone HCl (BUSPAR) 30 MG tablet Take 30 mg by mouth 2 times daily. 10/8/2024 at am    clomiPRAMINE (ANAFRANIL) 50 MG capsule Take 50 mg by mouth at bedtime. 10/7/2024    cloNIDine (CATAPRES) 0.2 MG tablet Take 1 tablet (0.2 mg) by mouth 3 times daily 10/8/2024 at x1 am    doxycycline hyclate (VIBRAMYCIN) 100 MG capsule Take 100 mg by mouth 2 times daily. 10/8/2024 at am    fenofibrate (TRICOR) 48 MG tablet Take 48 mg by mouth  daily. More than a month at ran out of refills    Fexofenadine HCl (ALLEGRA PO) Take 180 mg by mouth daily as needed for allergies Unknown    furosemide (LASIX) 20 MG tablet Take 60 mg by mouth daily. 10/8/2024 at am    gabapentin (NEURONTIN) 600 MG tablet Take 600 mg by mouth 3 times daily. 10/8/2024 at am    hydrOXYzine (VISTARIL) 50 MG capsule Take 50 mg by mouth 3 times daily as needed for anxiety or itching. Unknown    ibuprofen (ADVIL/MOTRIN) 800 MG tablet Take 800 mg by mouth every 6 hours as needed. 10/8/2024 at am    JARDIANCE 10 MG TABS tablet Take 1 tablet by mouth daily. 10/8/2024 at am    losartan (COZAAR) 25 MG tablet Take 1 tablet (25 mg) by mouth daily More than a month at ran out of refills    magnesium oxide 400 MG tablet Take 800 mg by mouth daily. 10/8/2024 at am    metFORMIN (GLUCOPHAGE XR) 500 MG 24 hr tablet Take 2 tablets by mouth daily (with breakfast). 10/8/2024 at am    multivitamin, therapeutic with minerals (MULTI-VITAMIN) TABS tablet Take 1 tablet by mouth daily 10/8/2024 at am    ondansetron (ZOFRAN) 8 MG tablet Take 8 mg by mouth every 8 hours as needed for nausea. Unknown    OZEMPIC, 2 MG/DOSE, 8 MG/3ML pen Inject 2 mg subcutaneously every 7 days. Takes on saturdays 10/5/2024    polyethylene glycol (MIRALAX/GLYCOLAX) Packet Take 1 packet by mouth daily 10/8/2024 at am    potassium chloride chalino ER (KLOR-CON M20) 20 MEQ CR tablet Take 2 tablets (40 mEq) by mouth 2 times daily 10/8/2024 at 20 meq    pramipexole (MIRAPEX) 1.5 MG tablet Take 1.5 mg by mouth 2 times daily. 10/8/2024 at am    propranolol (INDERAL) 20 MG tablet Take 20 mg by mouth 2 times daily 10/8/2024 at am    QUEtiapine (SEROQUEL) 400 MG tablet Take 400 mg by mouth at bedtime. May repeat x1 10/7/2024    SEMGLEE, YFGN, 100 UNIT/ML SOPN Inject 50 Units subcutaneously at bedtime. 10/7/2024    TRULANCE 3 MG tablet Take 3 mg by mouth daily. 10/8/2024    zolpidem ER (AMBIEN CR) 12.5 MG CR tablet Take 12.5 mg by mouth at  bedtime 10/7/2024        no chills/no fever no chills

## 2024-10-09 LAB
ALBUMIN SERPL BCG-MCNC: 3.2 G/DL (ref 3.5–5.2)
ALP SERPL-CCNC: 102 U/L (ref 40–150)
ALT SERPL W P-5'-P-CCNC: 50 U/L (ref 0–70)
ANION GAP SERPL CALCULATED.3IONS-SCNC: 7 MMOL/L (ref 7–15)
AST SERPL W P-5'-P-CCNC: 41 U/L (ref 0–45)
BASOPHILS # BLD AUTO: 0.1 10E3/UL (ref 0–0.2)
BASOPHILS NFR BLD AUTO: 1 %
BILIRUB DIRECT SERPL-MCNC: <0.2 MG/DL (ref 0–0.3)
BILIRUB SERPL-MCNC: 0.3 MG/DL
BUN SERPL-MCNC: 12.5 MG/DL (ref 6–20)
CALCIUM SERPL-MCNC: 9.1 MG/DL (ref 8.8–10.4)
CHLORIDE SERPL-SCNC: 94 MMOL/L (ref 98–107)
CREAT SERPL-MCNC: 0.96 MG/DL (ref 0.67–1.17)
EGFRCR SERPLBLD CKD-EPI 2021: >90 ML/MIN/1.73M2
EOSINOPHIL # BLD AUTO: 0.4 10E3/UL (ref 0–0.7)
EOSINOPHIL NFR BLD AUTO: 5 %
ERYTHROCYTE [DISTWIDTH] IN BLOOD BY AUTOMATED COUNT: 12.7 % (ref 10–15)
GLUCOSE BLDC GLUCOMTR-MCNC: 131 MG/DL (ref 70–99)
GLUCOSE BLDC GLUCOMTR-MCNC: 134 MG/DL (ref 70–99)
GLUCOSE BLDC GLUCOMTR-MCNC: 149 MG/DL (ref 70–99)
GLUCOSE BLDC GLUCOMTR-MCNC: 169 MG/DL (ref 70–99)
GLUCOSE SERPL-MCNC: 137 MG/DL (ref 70–99)
HCO3 SERPL-SCNC: 38 MMOL/L (ref 22–29)
HCT VFR BLD AUTO: 30.7 % (ref 40–53)
HGB BLD-MCNC: 10.3 G/DL (ref 13.3–17.7)
IMM GRANULOCYTES # BLD: 0.1 10E3/UL
IMM GRANULOCYTES NFR BLD: 1 %
LYMPHOCYTES # BLD AUTO: 1.6 10E3/UL (ref 0.8–5.3)
LYMPHOCYTES NFR BLD AUTO: 20 %
MCH RBC QN AUTO: 28.7 PG (ref 26.5–33)
MCHC RBC AUTO-ENTMCNC: 33.6 G/DL (ref 31.5–36.5)
MCV RBC AUTO: 86 FL (ref 78–100)
MONOCYTES # BLD AUTO: 0.9 10E3/UL (ref 0–1.3)
MONOCYTES NFR BLD AUTO: 11 %
NEUTROPHILS # BLD AUTO: 4.8 10E3/UL (ref 1.6–8.3)
NEUTROPHILS NFR BLD AUTO: 61 %
NRBC # BLD AUTO: 0 10E3/UL
NRBC BLD AUTO-RTO: 0 /100
PLATELET # BLD AUTO: 345 10E3/UL (ref 150–450)
POTASSIUM SERPL-SCNC: 3 MMOL/L (ref 3.4–5.3)
POTASSIUM SERPL-SCNC: 3.1 MMOL/L (ref 3.4–5.3)
POTASSIUM SERPL-SCNC: 3.1 MMOL/L (ref 3.4–5.3)
POTASSIUM SERPL-SCNC: 3.5 MMOL/L (ref 3.4–5.3)
PROT SERPL-MCNC: 6.1 G/DL (ref 6.4–8.3)
RBC # BLD AUTO: 3.59 10E6/UL (ref 4.4–5.9)
SODIUM SERPL-SCNC: 139 MMOL/L (ref 135–145)
WBC # BLD AUTO: 7.9 10E3/UL (ref 4–11)

## 2024-10-09 PROCEDURE — 250N000011 HC RX IP 250 OP 636: Performed by: INTERNAL MEDICINE

## 2024-10-09 PROCEDURE — 250N000013 HC RX MED GY IP 250 OP 250 PS 637: Performed by: HOSPITALIST

## 2024-10-09 PROCEDURE — 250N000012 HC RX MED GY IP 250 OP 636 PS 637: Performed by: INTERNAL MEDICINE

## 2024-10-09 PROCEDURE — 84132 ASSAY OF SERUM POTASSIUM: CPT | Performed by: HOSPITALIST

## 2024-10-09 PROCEDURE — G0463 HOSPITAL OUTPT CLINIC VISIT: HCPCS

## 2024-10-09 PROCEDURE — 258N000003 HC RX IP 258 OP 636: Performed by: INTERNAL MEDICINE

## 2024-10-09 PROCEDURE — 99232 SBSQ HOSP IP/OBS MODERATE 35: CPT | Performed by: INTERNAL MEDICINE

## 2024-10-09 PROCEDURE — 36415 COLL VENOUS BLD VENIPUNCTURE: CPT | Performed by: HOSPITALIST

## 2024-10-09 PROCEDURE — 82248 BILIRUBIN DIRECT: CPT | Performed by: INTERNAL MEDICINE

## 2024-10-09 PROCEDURE — 99222 1ST HOSP IP/OBS MODERATE 55: CPT | Performed by: PHYSICIAN ASSISTANT

## 2024-10-09 PROCEDURE — 250N000013 HC RX MED GY IP 250 OP 250 PS 637: Performed by: INTERNAL MEDICINE

## 2024-10-09 PROCEDURE — 250N000011 HC RX IP 250 OP 636: Mod: JZ | Performed by: INTERNAL MEDICINE

## 2024-10-09 PROCEDURE — 85025 COMPLETE CBC W/AUTO DIFF WBC: CPT | Performed by: INTERNAL MEDICINE

## 2024-10-09 PROCEDURE — 36415 COLL VENOUS BLD VENIPUNCTURE: CPT | Performed by: INTERNAL MEDICINE

## 2024-10-09 PROCEDURE — 84132 ASSAY OF SERUM POTASSIUM: CPT | Performed by: INTERNAL MEDICINE

## 2024-10-09 PROCEDURE — 120N000001 HC R&B MED SURG/OB

## 2024-10-09 RX ORDER — POTASSIUM CHLORIDE 1.5 G/1.58G
20 POWDER, FOR SOLUTION ORAL ONCE
Status: COMPLETED | OUTPATIENT
Start: 2024-10-09 | End: 2024-10-09

## 2024-10-09 RX ORDER — POTASSIUM CHLORIDE 1500 MG/1
40 TABLET, EXTENDED RELEASE ORAL ONCE
Status: COMPLETED | OUTPATIENT
Start: 2024-10-09 | End: 2024-10-09

## 2024-10-09 RX ORDER — POTASSIUM CHLORIDE 1.5 G/1.58G
40 POWDER, FOR SOLUTION ORAL ONCE
Status: COMPLETED | OUTPATIENT
Start: 2024-10-09 | End: 2024-10-09

## 2024-10-09 RX ORDER — ENOXAPARIN SODIUM 100 MG/ML
40 INJECTION SUBCUTANEOUS EVERY 24 HOURS
Status: DISCONTINUED | OUTPATIENT
Start: 2024-10-09 | End: 2024-10-10 | Stop reason: HOSPADM

## 2024-10-09 RX ORDER — LORAZEPAM 0.5 MG/1
0.5 TABLET ORAL EVERY 12 HOURS PRN
Status: DISCONTINUED | OUTPATIENT
Start: 2024-10-09 | End: 2024-10-10 | Stop reason: HOSPADM

## 2024-10-09 RX ORDER — CLINDAMYCIN PHOSPHATE 900 MG/50ML
900 INJECTION, SOLUTION INTRAVENOUS EVERY 8 HOURS
Status: DISCONTINUED | OUTPATIENT
Start: 2024-10-09 | End: 2024-10-10 | Stop reason: HOSPADM

## 2024-10-09 RX ADMIN — BUPRENORPHINE AND NALOXONE 1 FILM: 8; 2 FILM BUCCAL; SUBLINGUAL at 08:55

## 2024-10-09 RX ADMIN — Medication 800 MG: at 08:51

## 2024-10-09 RX ADMIN — ENOXAPARIN SODIUM 40 MG: 40 INJECTION SUBCUTANEOUS at 12:47

## 2024-10-09 RX ADMIN — ZOLPIDEM TARTRATE 5 MG: 5 TABLET ORAL at 22:53

## 2024-10-09 RX ADMIN — INSULIN ASPART 1 UNITS: 100 INJECTION, SOLUTION INTRAVENOUS; SUBCUTANEOUS at 09:08

## 2024-10-09 RX ADMIN — SENNOSIDES AND DOCUSATE SODIUM 1 TABLET: 50; 8.6 TABLET ORAL at 08:48

## 2024-10-09 RX ADMIN — LORAZEPAM 0.5 MG: 0.5 TABLET ORAL at 12:47

## 2024-10-09 RX ADMIN — GABAPENTIN 600 MG: 600 TABLET, FILM COATED ORAL at 15:00

## 2024-10-09 RX ADMIN — CEFAZOLIN SODIUM 2 G: 2 INJECTION, SOLUTION INTRAVENOUS at 03:23

## 2024-10-09 RX ADMIN — Medication 1 TABLET: at 08:51

## 2024-10-09 RX ADMIN — LOSARTAN POTASSIUM 25 MG: 25 TABLET, FILM COATED ORAL at 08:51

## 2024-10-09 RX ADMIN — PRAMIPEXOLE DIHYDROCHLORIDE 1.5 MG: 0.5 TABLET ORAL at 08:48

## 2024-10-09 RX ADMIN — CLINDAMYCIN PHOSPHATE 900 MG: 900 INJECTION, SOLUTION INTRAVENOUS at 12:48

## 2024-10-09 RX ADMIN — CLONIDINE HYDROCHLORIDE 0.2 MG: 0.1 TABLET ORAL at 08:51

## 2024-10-09 RX ADMIN — POTASSIUM CHLORIDE 40 MEQ: 20 SOLUTION ORAL at 01:35

## 2024-10-09 RX ADMIN — BUSPIRONE HYDROCHLORIDE 30 MG: 15 TABLET ORAL at 08:48

## 2024-10-09 RX ADMIN — CEFAZOLIN SODIUM 2 G: 2 INJECTION, SOLUTION INTRAVENOUS at 17:54

## 2024-10-09 RX ADMIN — GABAPENTIN 600 MG: 600 TABLET, FILM COATED ORAL at 08:51

## 2024-10-09 RX ADMIN — POLYETHYLENE GLYCOL 3350 17 G: 17 POWDER, FOR SOLUTION ORAL at 08:51

## 2024-10-09 RX ADMIN — ZOLPIDEM TARTRATE 5 MG: 5 TABLET ORAL at 21:01

## 2024-10-09 RX ADMIN — FUROSEMIDE 60 MG: 40 TABLET ORAL at 08:48

## 2024-10-09 RX ADMIN — POTASSIUM CHLORIDE 40 MEQ: 1500 TABLET, EXTENDED RELEASE ORAL at 08:49

## 2024-10-09 RX ADMIN — EMPAGLIFLOZIN 10 MG: 10 TABLET, FILM COATED ORAL at 08:49

## 2024-10-09 RX ADMIN — BUPRENORPHINE AND NALOXONE 1 FILM: 8; 2 FILM BUCCAL; SUBLINGUAL at 21:00

## 2024-10-09 RX ADMIN — CEFAZOLIN SODIUM 2 G: 2 INJECTION, SOLUTION INTRAVENOUS at 10:25

## 2024-10-09 RX ADMIN — CLOMIPRAMINE HYDROCHLORIDE 50 MG: 50 CAPSULE ORAL at 21:02

## 2024-10-09 RX ADMIN — PRAMIPEXOLE DIHYDROCHLORIDE 1.5 MG: 0.5 TABLET ORAL at 21:00

## 2024-10-09 RX ADMIN — ASPIRIN 81 MG: 81 TABLET, COATED ORAL at 08:51

## 2024-10-09 RX ADMIN — POTASSIUM CHLORIDE 20 MEQ: 20 SOLUTION ORAL at 03:23

## 2024-10-09 RX ADMIN — PROPRANOLOL HYDROCHLORIDE 20 MG: 20 TABLET ORAL at 08:57

## 2024-10-09 RX ADMIN — POTASSIUM CHLORIDE 20 MEQ: 1.5 POWDER, FOR SOLUTION ORAL at 14:52

## 2024-10-09 RX ADMIN — GABAPENTIN 600 MG: 600 TABLET, FILM COATED ORAL at 21:01

## 2024-10-09 RX ADMIN — FENOFIBRATE 54 MG: 54 TABLET ORAL at 08:51

## 2024-10-09 RX ADMIN — QUETIAPINE FUMARATE 400 MG: 200 TABLET ORAL at 21:01

## 2024-10-09 RX ADMIN — BUSPIRONE HYDROCHLORIDE 30 MG: 15 TABLET ORAL at 21:00

## 2024-10-09 RX ADMIN — SODIUM CHLORIDE 500 ML: 9 INJECTION, SOLUTION INTRAVENOUS at 14:21

## 2024-10-09 RX ADMIN — METOLAZONE 5 MG: 5 TABLET ORAL at 08:57

## 2024-10-09 RX ADMIN — POTASSIUM CHLORIDE 40 MEQ: 1.5 POWDER, FOR SOLUTION ORAL at 13:21

## 2024-10-09 RX ADMIN — METFORMIN ER 500 MG 1000 MG: 500 TABLET ORAL at 08:58

## 2024-10-09 RX ADMIN — INSULIN GLARGINE 50 UNITS: 100 INJECTION, SOLUTION SUBCUTANEOUS at 21:00

## 2024-10-09 RX ADMIN — CLINDAMYCIN PHOSPHATE 900 MG: 900 INJECTION, SOLUTION INTRAVENOUS at 19:42

## 2024-10-09 ASSESSMENT — ACTIVITIES OF DAILY LIVING (ADL)
ADLS_ACUITY_SCORE: 32
ADLS_ACUITY_SCORE: 31
ADLS_ACUITY_SCORE: 32
ADLS_ACUITY_SCORE: 32
ADLS_ACUITY_SCORE: 31
ADLS_ACUITY_SCORE: 32
ADLS_ACUITY_SCORE: 32
ADLS_ACUITY_SCORE: 31
ADLS_ACUITY_SCORE: 32
ADLS_ACUITY_SCORE: 31
ADLS_ACUITY_SCORE: 32
ADLS_ACUITY_SCORE: 32
ADLS_ACUITY_SCORE: 31
ADLS_ACUITY_SCORE: 32
ADLS_ACUITY_SCORE: 31
ADLS_ACUITY_SCORE: 32
ADLS_ACUITY_SCORE: 31
ADLS_ACUITY_SCORE: 32
ADLS_ACUITY_SCORE: 31

## 2024-10-09 NOTE — PLAN OF CARE
"Goal Outcome Evaluation:    A&Ox4. Ind in room. Regular diet. BS ACHS. RLE cellulitis on int abx.R PIV S/L.  Hypotension earlier, 500cc bolus given to effect. BP within normal limits. Pt states his bp gets low at home with the clonidine and routinely does not take the middle dose as \"it works really well.\" Does not follow with a PCP, but rather the person who prescribes his suboxone and does not follow up regarding bp concerns. BP meds held for now. MD aware. Mother has been here most of day.     "

## 2024-10-09 NOTE — PROGRESS NOTES
Patient set off bed alarm and was upset when writer rushed in. Patient was adamant about keeping the bed alarm off because he thinks he does not need it. Patient appeared independent and was in the process of of straight cathing himself. Bed alarm was turned off.

## 2024-10-09 NOTE — PLAN OF CARE
Goal Outcome Evaluation:    Date & Time: 10/8/24 1774-1043   Surgery/POD#:  RLE cellulitis/ Possible Ileus   Behavior & Aggression: Green  Fall Risk: Yes  Orientation: A&Ox4   ABNL VS/O2: VSS on RA   ABNL Labs: K+ 2.9   Pain Management: Denies   Bowel/Bladder: Continent  Drains: PIV SL  Wounds/incisions: None   Diet: Clear liquid diet   Activity Level: Independent   Tests/Procedures: N/A  Anticipated  DC Date: Pending  Significant Information: K+ replaced overnight.
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
## 2024-10-09 NOTE — PLAN OF CARE
Orientation: A/Ox4. Flat affect & very anxious  Aggression Stop Light: Green  Activity: SBA  Diet/BS Checks: Mod carb diet w/ BG checks ACHS. Insulin given per sliding scale  Tele:  n/a  IV Access/Drains: R PIV SL w/ int abx  Pain Management: mild pain to lower extremity, denied meds at the moment  Abnormal VS/Results: VSS on RA   Bowel/Bladder: Cont B/B, LBM 4 days ago per pt  Skin/Wounds: abrasion to L & R ankle/shin, cellulitis to RLE, healing incision to back of L shoulder & L armpit  Consults: WOC  D/C Disposition: pending    Other Info:   - abd xray completed this evening, concern for SBO vs ileus  - US of R lower extremity ordered - not completed  - K+ replacement protocol, awaiting lab draw

## 2024-10-09 NOTE — PROVIDER NOTIFICATION
"Provider notified over bp of 77/46. Pt is asymptomatic, just saying he is \"tired\". Denies dizziness, does not look pale. We will monitor for 30 min, encourage PO intake and recheck bp.     Dr Trinh via [employer] at 1330.   "

## 2024-10-09 NOTE — DISCHARGE INSTRUCTIONS
Bilateral leg wound(s): Every other day  Cleanse with vashe soaked gauze for 5minutes.  Cover open wounds with xeroform gauze and secure with mepilex to fit.  Apply EdemaWear Navy/Small from toes to knees.     EDEMA WEAR Stockings- apply fresh pair daily  DO NOT CUT OR TRIM STOCKINGS  Remove and set aside stockings, do not throw away  SKIN/WOUND CARE  Clean feet and legs with gentle wash  Complete any wound or skin treatments  Apply Edema Wear from toes to knees with a cuff at the top of the stockings  CARE OF EDEMAWEAR     DO NOT THROW AWAY THE OLD PAIR OF EDEMA WEAR, WASH IT.   Wash stocking(s) with soap and HOT water. If really soiled use a surgical soap then regular soap and/or you may need to wash several times  Hang dry

## 2024-10-09 NOTE — PLAN OF CARE
10/-[id number]    Summary: admitted with Right leg redness and swelling.    Primary Diagnosis: Right lower extremity cellulitis.    Orientation: A/Ox4. Flat affect & very anxious    Aggression Stop Light: Green    Activity: SBA    Diet/BS Checks: clear liquid w/ BG checks ACHS. Insulin given per sliding scale    Tele:  n/a    IV Access/Drains: R PIV SL    Pain Management:  RLE pain, PRN Tylenol given    Abnormal VS/Results: VSS on RA     Bowel/Bladder: Cont B/B, had 2 BM in this shift    Skin/Wounds: abrasion to L & R ankle/shin, cellulitis to RLE, healing incision to back of L shoulder & L armpit    Consults: WOC    D/C Disposition: pending    Other Info:   - US of R lower extremity done. On K replacement protocol- writer called lab, blood not collected yet. Needs to follow up.

## 2024-10-10 VITALS
OXYGEN SATURATION: 93 % | BODY MASS INDEX: 34.05 KG/M2 | RESPIRATION RATE: 18 BRPM | SYSTOLIC BLOOD PRESSURE: 108 MMHG | TEMPERATURE: 98.1 F | HEART RATE: 77 BPM | DIASTOLIC BLOOD PRESSURE: 68 MMHG | HEIGHT: 68 IN | WEIGHT: 224.7 LBS

## 2024-10-10 LAB
ANION GAP SERPL CALCULATED.3IONS-SCNC: 8 MMOL/L (ref 7–15)
BUN SERPL-MCNC: 11.6 MG/DL (ref 6–20)
CALCIUM SERPL-MCNC: 9.6 MG/DL (ref 8.8–10.4)
CHLORIDE SERPL-SCNC: 93 MMOL/L (ref 98–107)
CREAT SERPL-MCNC: 0.93 MG/DL (ref 0.67–1.17)
EGFRCR SERPLBLD CKD-EPI 2021: >90 ML/MIN/1.73M2
ERYTHROCYTE [DISTWIDTH] IN BLOOD BY AUTOMATED COUNT: 13.2 % (ref 10–15)
GLUCOSE BLDC GLUCOMTR-MCNC: 101 MG/DL (ref 70–99)
GLUCOSE BLDC GLUCOMTR-MCNC: 98 MG/DL (ref 70–99)
GLUCOSE BLDC GLUCOMTR-MCNC: 98 MG/DL (ref 70–99)
GLUCOSE SERPL-MCNC: 91 MG/DL (ref 70–99)
HCO3 SERPL-SCNC: 38 MMOL/L (ref 22–29)
HCT VFR BLD AUTO: 33.4 % (ref 40–53)
HGB BLD-MCNC: 11.3 G/DL (ref 13.3–17.7)
MCH RBC QN AUTO: 29.2 PG (ref 26.5–33)
MCHC RBC AUTO-ENTMCNC: 33.8 G/DL (ref 31.5–36.5)
MCV RBC AUTO: 86 FL (ref 78–100)
PLATELET # BLD AUTO: 382 10E3/UL (ref 150–450)
POTASSIUM SERPL-SCNC: 3.1 MMOL/L (ref 3.4–5.3)
RBC # BLD AUTO: 3.87 10E6/UL (ref 4.4–5.9)
SODIUM SERPL-SCNC: 139 MMOL/L (ref 135–145)
WBC # BLD AUTO: 8 10E3/UL (ref 4–11)

## 2024-10-10 PROCEDURE — 82310 ASSAY OF CALCIUM: CPT | Performed by: INTERNAL MEDICINE

## 2024-10-10 PROCEDURE — 250N000013 HC RX MED GY IP 250 OP 250 PS 637: Performed by: INTERNAL MEDICINE

## 2024-10-10 PROCEDURE — 99232 SBSQ HOSP IP/OBS MODERATE 35: CPT | Performed by: PHYSICIAN ASSISTANT

## 2024-10-10 PROCEDURE — 36415 COLL VENOUS BLD VENIPUNCTURE: CPT | Performed by: INTERNAL MEDICINE

## 2024-10-10 PROCEDURE — 250N000011 HC RX IP 250 OP 636: Performed by: INTERNAL MEDICINE

## 2024-10-10 PROCEDURE — 99239 HOSP IP/OBS DSCHRG MGMT >30: CPT | Performed by: INTERNAL MEDICINE

## 2024-10-10 PROCEDURE — 80048 BASIC METABOLIC PNL TOTAL CA: CPT | Performed by: INTERNAL MEDICINE

## 2024-10-10 PROCEDURE — 250N000011 HC RX IP 250 OP 636: Mod: JZ | Performed by: INTERNAL MEDICINE

## 2024-10-10 PROCEDURE — 85014 HEMATOCRIT: CPT | Performed by: INTERNAL MEDICINE

## 2024-10-10 PROCEDURE — 250N000012 HC RX MED GY IP 250 OP 636 PS 637: Performed by: INTERNAL MEDICINE

## 2024-10-10 RX ORDER — POTASSIUM CHLORIDE 1500 MG/1
20 TABLET, EXTENDED RELEASE ORAL DAILY
Status: SHIPPED
Start: 2024-10-10 | End: 2024-10-10

## 2024-10-10 RX ORDER — POTASSIUM CHLORIDE 1.5 G/1.58G
40 POWDER, FOR SOLUTION ORAL ONCE
Status: COMPLETED | OUTPATIENT
Start: 2024-10-10 | End: 2024-10-10

## 2024-10-10 RX ORDER — FUROSEMIDE 20 MG/1
40 TABLET ORAL DAILY PRN
Status: SHIPPED
Start: 2024-10-10

## 2024-10-10 RX ORDER — CLONIDINE HYDROCHLORIDE 0.1 MG/1
0.1 TABLET ORAL 2 TIMES DAILY
Qty: 60 TABLET | Refills: 0 | Status: SHIPPED | OUTPATIENT
Start: 2024-10-10 | End: 2024-11-09

## 2024-10-10 RX ORDER — CEPHALEXIN 500 MG/1
500 CAPSULE ORAL 4 TIMES DAILY
Qty: 48 CAPSULE | Refills: 0 | Status: SHIPPED | OUTPATIENT
Start: 2024-10-10 | End: 2024-10-22

## 2024-10-10 RX ORDER — CLONIDINE HYDROCHLORIDE 0.1 MG/1
0.1 TABLET ORAL 2 TIMES DAILY
Status: DISCONTINUED | OUTPATIENT
Start: 2024-10-10 | End: 2024-10-10 | Stop reason: HOSPADM

## 2024-10-10 RX ORDER — METOPROLOL SUCCINATE 25 MG/1
25 TABLET, EXTENDED RELEASE ORAL DAILY
Status: DISCONTINUED | OUTPATIENT
Start: 2024-10-11 | End: 2024-10-10 | Stop reason: HOSPADM

## 2024-10-10 RX ORDER — POTASSIUM CHLORIDE 1500 MG/1
20 TABLET, EXTENDED RELEASE ORAL DAILY PRN
Status: SHIPPED
Start: 2024-10-10

## 2024-10-10 RX ORDER — CLINDAMYCIN HYDROCHLORIDE 300 MG/1
300 CAPSULE ORAL 4 TIMES DAILY
Qty: 48 CAPSULE | Refills: 0 | Status: SHIPPED | OUTPATIENT
Start: 2024-10-10 | End: 2024-10-22

## 2024-10-10 RX ORDER — METOPROLOL SUCCINATE 25 MG/1
25 TABLET, EXTENDED RELEASE ORAL DAILY
Qty: 30 TABLET | Refills: 0 | Status: SHIPPED | OUTPATIENT
Start: 2024-10-11

## 2024-10-10 RX ADMIN — CLINDAMYCIN PHOSPHATE 900 MG: 900 INJECTION, SOLUTION INTRAVENOUS at 10:25

## 2024-10-10 RX ADMIN — BUPRENORPHINE AND NALOXONE 1 FILM: 8; 2 FILM BUCCAL; SUBLINGUAL at 08:36

## 2024-10-10 RX ADMIN — CLINDAMYCIN PHOSPHATE 900 MG: 900 INJECTION, SOLUTION INTRAVENOUS at 02:51

## 2024-10-10 RX ADMIN — METFORMIN ER 500 MG 1000 MG: 500 TABLET ORAL at 08:37

## 2024-10-10 RX ADMIN — FENOFIBRATE 54 MG: 54 TABLET ORAL at 08:36

## 2024-10-10 RX ADMIN — ENOXAPARIN SODIUM 40 MG: 40 INJECTION SUBCUTANEOUS at 10:25

## 2024-10-10 RX ADMIN — ASPIRIN 81 MG: 81 TABLET, COATED ORAL at 08:38

## 2024-10-10 RX ADMIN — CEFAZOLIN SODIUM 2 G: 2 INJECTION, SOLUTION INTRAVENOUS at 09:00

## 2024-10-10 RX ADMIN — PROPRANOLOL HYDROCHLORIDE 20 MG: 20 TABLET ORAL at 08:46

## 2024-10-10 RX ADMIN — POTASSIUM CHLORIDE 40 MEQ: 1.5 POWDER, FOR SOLUTION ORAL at 10:25

## 2024-10-10 RX ADMIN — CEFAZOLIN SODIUM 2 G: 2 INJECTION, SOLUTION INTRAVENOUS at 02:00

## 2024-10-10 RX ADMIN — GABAPENTIN 600 MG: 600 TABLET, FILM COATED ORAL at 15:19

## 2024-10-10 RX ADMIN — CLONIDINE HYDROCHLORIDE 0.1 MG: 0.1 TABLET ORAL at 14:19

## 2024-10-10 RX ADMIN — BUSPIRONE HYDROCHLORIDE 30 MG: 15 TABLET ORAL at 08:36

## 2024-10-10 RX ADMIN — SENNOSIDES AND DOCUSATE SODIUM 1 TABLET: 50; 8.6 TABLET ORAL at 08:36

## 2024-10-10 RX ADMIN — ATORVASTATIN CALCIUM 20 MG: 20 TABLET, FILM COATED ORAL at 08:36

## 2024-10-10 RX ADMIN — Medication 1 TABLET: at 08:36

## 2024-10-10 RX ADMIN — Medication 800 MG: at 08:37

## 2024-10-10 RX ADMIN — POLYETHYLENE GLYCOL 3350 17 G: 17 POWDER, FOR SOLUTION ORAL at 08:38

## 2024-10-10 RX ADMIN — GABAPENTIN 600 MG: 600 TABLET, FILM COATED ORAL at 08:36

## 2024-10-10 RX ADMIN — PRAMIPEXOLE DIHYDROCHLORIDE 1.5 MG: 0.5 TABLET ORAL at 08:36

## 2024-10-10 ASSESSMENT — ACTIVITIES OF DAILY LIVING (ADL)
ADLS_ACUITY_SCORE: 31

## 2024-10-10 NOTE — PLAN OF CARE
[id number]    Orientation: A&O x4, cooperative  Aggression Stop Light: Green  Activity: Independent in room   Diet/BS Checks: Regular, tolerating well  Tele: n/a  IV Access/Drains: R PIV SL, w/ intermittent abx  Pain Management: denies pain this shift  Abnormal VS/Results: VSS on RA this shift, soft BP's at times  Bowel/Bladder: continent of b/b, self caths, has own supplies in room   Skin/Wounds: RLE swelling, scattered abrasions  Consults: WOC, ID  D/C Disposition: pending
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
## 2024-10-10 NOTE — PROGRESS NOTES
Writer met with the patient and Mother at the bedside. Patient is A+Ox4 up ad radha. Patient has orders for discharge to home with need for primary care MD.  Patient noted his MD's retired and he needs to establish a new provider.    Patient agreeable to have [facility] for follow up. Appointment for the following:    You are scheduled for primary care follow up with the following provider:  KIMBERLY Stinson  Tuesday October 15, 2024 check in at 1:30 p.m. for a 1:40 p.m. appointment  Located at:  [facility]   [address]   [place] [address]   [phone]   Please discuss getting a referral for Medication Management with this provider.    Patient's [employer]  Cam phone [phone] he will [place] back with the patient on Monday for follow up and help with complex medication management.    No further needs identified. Updated Bedside RN on the above and cleared consult..      Kathy Hahn RN, BSN, ACM   Care Transitions Specialist  [facility]  Care Transitions Specialist  [address] [place] [address]  [email or web address]  Office: [phone] Fax: [phone]  [facility]

## 2024-10-10 NOTE — PLAN OF CARE
Goal Outcome Evaluation:       Pt A&Ox4. VSS on RA. Reg diet, BG checks done. Up IND in room, voiding in BR, int self-cath. R PIV SL w/ int abx. On K+ protocol, replaced. Denies pain. BLE WOC cares done, edemawear in place. PIV removed, discharge meds and belongings sent with patient. Discharge education provided to pt and family, verbalized understanding. All consulting providers signed off. Pt discharged home w/ Family, Continue plan of Care.
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
## 2024-10-10 NOTE — PLAN OF CARE
Care plan note:      Recent Vitals:  Temp: 97.9  F (36.6  C) Temp src: Oral BP: 98/65 Pulse: 78   Resp: 18 SpO2: 99 % O2 Device: None (Room air)      Orientation/Neuro: Alert and Oriented x 4  Pain: The patient is not having any pain.   Tele: N/A   IV medications: IV antibiotics   Mobility: up ad radha   Skin: Wounds: Right leg, with large area of cellulitis.   Resp: RA.  GI: WDL  : WDL     Diet: Tolerating diet:   Well  Orders Placed This Encounter      Regular Diet Adult      Safety/Concerns:  None  Aggression Color: Green    Plan: Pt remains afebrile, BP soft but improving some. Cellulitis area marked, pt states erythema is improving.    Continue to monitor.      Maira Adam RN

## 2024-10-11 ENCOUNTER — PATIENT OUTREACH (OUTPATIENT)
Dept: FAMILY MEDICINE | Facility: CLINIC | Age: 54
End: 2024-10-11
Payer: COMMERCIAL

## 2024-10-11 NOTE — TELEPHONE ENCOUNTER
ED / Discharge Outreach Protocol    Patient Contact    Attempt # 1    Was call answered?  No.  Left message on voicemail with information to call nurse back.  Sherlyn Santana RN

## 2024-10-13 LAB — BACTERIA BLD CULT: NO GROWTH

## 2024-11-17 ENCOUNTER — HOSPITAL ENCOUNTER (OUTPATIENT)
Dept: MRI IMAGING | Facility: CLINIC | Age: 54
Discharge: HOME | End: 2024-11-17
Attending: INTERNAL MEDICINE | Admitting: INTERNAL MEDICINE
Payer: COMMERCIAL

## 2024-11-17 DIAGNOSIS — C43.59 MELANOMA OF BACK (H): ICD-10-CM

## 2024-11-17 PROCEDURE — A9585 GADOBUTROL INJECTION: HCPCS | Performed by: INTERNAL MEDICINE

## 2024-11-17 PROCEDURE — 70553 MRI BRAIN STEM W/O & W/DYE: CPT

## 2024-11-17 PROCEDURE — 255N000002 HC RX 255 OP 636: Performed by: INTERNAL MEDICINE

## 2024-11-17 RX ORDER — GADOBUTROL 604.72 MG/ML
10 INJECTION INTRAVENOUS ONCE
Status: COMPLETED | OUTPATIENT
Start: 2024-11-17 | End: 2024-11-17

## 2024-11-17 RX ADMIN — GADOBUTROL 10 ML: 604.72 INJECTION INTRAVENOUS at 15:02

## 2024-12-13 ENCOUNTER — HOSPITAL ENCOUNTER (OUTPATIENT)
Dept: MAMMOGRAPHY | Facility: CLINIC | Age: 54
Discharge: HOME | End: 2024-12-13
Attending: INTERNAL MEDICINE | Admitting: INTERNAL MEDICINE
Payer: COMMERCIAL

## 2024-12-13 DIAGNOSIS — C43.59: ICD-10-CM

## 2024-12-13 PROCEDURE — 76882 US LMTD JT/FCL EVL NVASC XTR: CPT | Mod: 50

## 2025-02-27 ENCOUNTER — OFFICE VISIT (OUTPATIENT)
Dept: URGENT CARE | Facility: URGENT CARE | Age: 55
End: 2025-02-27

## 2025-02-27 ENCOUNTER — ANCILLARY PROCEDURE (OUTPATIENT)
Dept: GENERAL RADIOLOGY | Facility: CLINIC | Age: 55
End: 2025-02-27
Attending: PHYSICIAN ASSISTANT

## 2025-02-27 VITALS
TEMPERATURE: 97.7 F | OXYGEN SATURATION: 98 % | DIASTOLIC BLOOD PRESSURE: 79 MMHG | HEART RATE: 87 BPM | RESPIRATION RATE: 20 BRPM | SYSTOLIC BLOOD PRESSURE: 128 MMHG

## 2025-02-27 DIAGNOSIS — L97.418 DIABETIC ULCER OF RIGHT MIDFOOT ASSOCIATED WITH TYPE 2 DIABETES MELLITUS, WITH OTHER ULCER SEVERITY (H): ICD-10-CM

## 2025-02-27 DIAGNOSIS — E11.621 DIABETIC ULCER OF RIGHT MIDFOOT ASSOCIATED WITH TYPE 2 DIABETES MELLITUS, WITH OTHER ULCER SEVERITY (H): ICD-10-CM

## 2025-02-27 DIAGNOSIS — L03.115 CELLULITIS OF RIGHT LOWER LEG: Primary | ICD-10-CM

## 2025-02-27 LAB
BASOPHILS # BLD AUTO: 0.1 10E3/UL (ref 0–0.2)
BASOPHILS NFR BLD AUTO: 1 %
EOSINOPHIL # BLD AUTO: 0.3 10E3/UL (ref 0–0.7)
EOSINOPHIL NFR BLD AUTO: 3 %
ERYTHROCYTE [DISTWIDTH] IN BLOOD BY AUTOMATED COUNT: 14 % (ref 10–15)
HCT VFR BLD AUTO: 38.1 % (ref 40–53)
HGB BLD-MCNC: 13.4 G/DL (ref 13.3–17.7)
IMM GRANULOCYTES # BLD: 0.1 10E3/UL
IMM GRANULOCYTES NFR BLD: 1 %
LYMPHOCYTES # BLD AUTO: 1.7 10E3/UL (ref 0.8–5.3)
LYMPHOCYTES NFR BLD AUTO: 18 %
MCH RBC QN AUTO: 28.5 PG (ref 26.5–33)
MCHC RBC AUTO-ENTMCNC: 35.2 G/DL (ref 31.5–36.5)
MCV RBC AUTO: 81 FL (ref 78–100)
MONOCYTES # BLD AUTO: 1 10E3/UL (ref 0–1.3)
MONOCYTES NFR BLD AUTO: 11 %
NEUTROPHILS # BLD AUTO: 6 10E3/UL (ref 1.6–8.3)
NEUTROPHILS NFR BLD AUTO: 66 %
PLATELET # BLD AUTO: 274 10E3/UL (ref 150–450)
RBC # BLD AUTO: 4.7 10E6/UL (ref 4.4–5.9)
WBC # BLD AUTO: 9.1 10E3/UL (ref 4–11)

## 2025-02-27 RX ORDER — CEFTRIAXONE SODIUM 1 G
1 VIAL (EA) INJECTION ONCE
Status: COMPLETED | OUTPATIENT
Start: 2025-02-27 | End: 2025-02-27

## 2025-02-27 RX ORDER — LIDOCAINE 50 MG/G
OINTMENT TOPICAL 2 TIMES DAILY
Qty: 35 G | Refills: 0 | Status: SHIPPED | OUTPATIENT
Start: 2025-02-27

## 2025-02-27 RX ADMIN — Medication 1 G: at 10:59

## 2025-02-27 NOTE — PROGRESS NOTES
Assessment & Plan     Cellulitis of right lower leg    Cellulitis is an infection of the deep layers of skin. A break in the skin, such as a cut or scratch, can let bacteria under the skin. If the bacteria get to deep layers of the skin, it can be serious. If not treated, cellulitis can get into the bloodstream and lymph nodes. The infection can then spread throughout the body. This causes serious illness.   Cellulitis causes the affected skin to become red, swollen, warm, and sore. The reddened areas have a visible border. An open sore may leak fluid (pus). You may have a fever, chills, and pain.   Cellulitis is treated with antibiotics taken for 7 to 10 days. An open sore may be cleaned and covered with cool wet gauze. Symptoms should get better 1 to 2 days after treatment is started. Make sure to take all the antibiotics for the full number of days until they are gone. Keep taking the medicine even if your symptoms go away.     CBC is normal  Foot xray is neg for osteomyelitis  Start on medications  Warm moist compresses  - CBC with platelets and differential  - CBC with platelets and differential  - cefTRIAXone (ROCEPHIN) in lidocaine 1% for IM administration 1 g  - amoxicillin-clavulanate (AUGMENTIN) 875-125 MG tablet  Dispense: 20 tablet; Refill: 0    Diabetic ulcer of right midfoot associated with type 2 diabetes mellitus, with other ulcer severity (H)    Xray foot Negative for acute findings, read by Yogesh HAHN at time of visit.    Urgent referral to Podiatrist for diabetic foot wound    Wounds that result from diabetes. These happen when nerves are damaged, so you may not feel pain. High blood sugar can damage nerves. If you don't notice them, sores and infections or other problems may not get treated.    - XR Foot Right G/E 3 Views  - cefTRIAXone (ROCEPHIN) in lidocaine 1% for IM administration 1 g  - amoxicillin-clavulanate (AUGMENTIN) 875-125 MG tablet  Dispense: 20 tablet; Refill: 0  -  lidocaine (XYLOCAINE) 5 % external ointment  Dispense: 35 g; Refill: 0  - Orthopedic  Referral       Referral to Podiatrist on urgent basis      No follow-ups on file.    Yogesh Hayes, [facility], PA-C  [facility] URGENT C[facility]    Kellee Bartlett is a 54 year old male who presents to clinic today for the following health issues:  Chief Complaint   Patient presents with    Foot Injury     RIGHT FOOT - STATES PUNCTURE WOUNDS and SMELLS INFECTED PER PATIENT       HPI  Review of Systems  Constitutional, HEENT, cardiovascular, pulmonary, gi and gu systems are negative, except as otherwise noted.      Objective    /79 (BP Location: Right arm, Patient Position: Sitting, Cuff Size: Adult Regular)   Pulse 87   Temp 97.7  F (36.5  C) (Tympanic)   Resp 20   SpO2 98%   Physical Exam   GENERAL: alert and no distress  RESP: lungs clear to auscultation - no rales, rhonchi or wheezes  CV: regular rate and rhythm, normal S1 S2, no S3 or S4, no murmur, click or rub, no peripheral edema  MS: pos for right plantar aspect of foot,   SKIN: pos for nickel sized diabetic wound plantar right foot  NEURO: Normal strength and tone, mentation intact and speech normal  PSYCH: mentation appears normal, affect normal/bright      Results for orders placed or performed in visit on 02/27/25   XR Foot Right G/E 3 Views     Status: None    Narrative    EXAM: XR FOOT RIGHT G/E 3 VIEWS  LOCATION: [facility]  DATE: 2/27/2025    INDICATION:  Diabetic ulcer of right midfoot associated with type 2 diabetes mellitus, with other ulcer severity (H), Diabetic ulcer of right midfoot associated with type 2 diabetes mellitus, with other ulcer severity (H)  COMPARISON: None.      Impression    IMPRESSION: Soft tissue ulceration along the plantar aspect of the forefoot. No definite radiographic evidence of osteomyelitis. No fracture or joint malalignment. 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 Diffuse  soft tissue swelling, most   pronounced in the forefoot.   Results for orders placed or performed in visit on 02/27/25   CBC with platelets and differential     Status: Abnormal   Result Value Ref Range    WBC Count 9.1 4.0 - 11.0 10e3/uL    RBC Count 4.70 4.40 - 5.90 10e6/uL    Hemoglobin 13.4 13.3 - 17.7 g/dL    Hematocrit 38.1 (L) 40.0 - 53.0 %    MCV 81 78 - 100 fL    MCH 28.5 26.5 - 33.0 pg    MCHC 35.2 31.5 - 36.5 g/dL    RDW 14.0 10.0 - 15.0 %    Platelet Count 274 150 - 450 10e3/uL    % Neutrophils 66 %    % Lymphocytes 18 %    % Monocytes 11 %    % Eosinophils 3 %    % Basophils 1 %    % Immature Granulocytes 1 %    Absolute Neutrophils 6.0 1.6 - 8.3 10e3/uL    Absolute Lymphocytes 1.7 0.8 - 5.3 10e3/uL    Absolute Monocytes 1.0 0.0 - 1.3 10e3/uL    Absolute Eosinophils 0.3 0.0 - 0.7 10e3/uL    Absolute Basophils 0.1 0.0 - 0.2 10e3/uL    Absolute Immature Granulocytes 0.1 <=0.4 10e3/uL   CBC with platelets and differential     Status: Abnormal    Narrative    The following orders were created for panel order CBC with platelets and differential.  Procedure                               Abnormality         Status                     ---------                               -----------         ------                     CBC with platelets and d...[[id number]]  Abnormal            Final result                 Please view results for these tests on the individual orders.

## 2025-03-11 ENCOUNTER — TELEPHONE (OUTPATIENT)
Dept: PODIATRY | Facility: CLINIC | Age: 55
End: 2025-03-11

## 2025-03-11 NOTE — TELEPHONE ENCOUNTER
I made an outbound phone call and left a brief message for the patient.  Consent to communicate is on file.  Patient is currently scheduled to see Dr. Pascual in sports medicine tomorrow for a left foot diabetic foot ulcer.  Per chart review, this is an inappropriate appointment that should not have been scheduled.    10 minutes spent coordinating with preferred podiatry locations to determine if he could be seen.  Currently has a held 10 AM appointment slot for Friday with Dr. Monge and antoine if patient can take it.  Will need a call back to confirm.    I let patient know that appointment for tomorrow will need to be canceled.  I requested a call back to our team immediately so that we can help get him the appropriate appointment.    Will attempt to call patient back at the end of the day.    Lastly, voicemail included red flags to be seen in the emergency room instead: Worsening symptoms since urgent care visit on 2/27/2025, warmth, nausea, vomiting, fever, chills, shortness of breath.    Please schedule patient for 10 AM with Dr. Monge in [facility] for Friday if it works out for the patient.    Trino Galindo, ATC

## 2025-03-11 NOTE — TELEPHONE ENCOUNTER
Message out to Dr. Monge regarding an opening for this Friday at [facility].  NO sooner openings with [facility] or [facility].

## 2025-03-11 NOTE — TELEPHONE ENCOUNTER
ATTN:    [facility]    RIGHT foot  Urgent Care Follow Up  On antibiotic  Imaging ON FILE    Pt needs to be seen as soon as possible. Are you able to fit him in?    Please CALL pt to discuss his concern. Thank you.

## 2025-03-11 NOTE — TELEPHONE ENCOUNTER
I made an outbound phone call to the patient, left a brief voicemail, repeating the instructions from previous message.  If patient calls back, please offer them that 10 AM held spot on Dr. Monge schedule at [facility] for Friday, 3/14/2025.  If patient calls back and describes worsening symptoms, they need to be directed to the ED per my voicemails.    Trino Galindo, ATC

## 2025-03-12 ENCOUNTER — TELEPHONE (OUTPATIENT)
Dept: PODIATRY | Facility: CLINIC | Age: 55
End: 2025-03-12

## 2025-03-12 NOTE — TELEPHONE ENCOUNTER
Other: Dhruv called back he took the appointment for Friday 03/14/25 at 10:00 am with Dr. Monge in [place],     Could we send this information to you [facility] or would you prefer to receive a phone call?:   Patient would prefer a phone call   Okay to leave a detailed message?: Yes at Cell number on file:    Telephone Information:   Mobile [phone]

## 2025-03-12 NOTE — TELEPHONE ENCOUNTER
[facility] the [facility]  I let the patient know that the appointment will be canceled, however, encouraged him to call her team back so we can help and get an appropriate appointment.  I again provided indicators that would warrant going to the emergency room for this condition.    Appointment will be canceled.    Trino Galindo, ATC

## 2025-08-06 ENCOUNTER — APPOINTMENT (OUTPATIENT)
Dept: ULTRASOUND IMAGING | Facility: CLINIC | Age: 55
End: 2025-08-06
Attending: EMERGENCY MEDICINE

## 2025-08-06 ENCOUNTER — HOSPITAL ENCOUNTER (INPATIENT)
Facility: CLINIC | Age: 55
End: 2025-08-06
Attending: EMERGENCY MEDICINE | Admitting: INTERNAL MEDICINE

## 2025-08-06 ENCOUNTER — APPOINTMENT (OUTPATIENT)
Dept: GENERAL RADIOLOGY | Facility: CLINIC | Age: 55
End: 2025-08-06
Attending: EMERGENCY MEDICINE

## 2025-08-06 ENCOUNTER — OFFICE VISIT (OUTPATIENT)
Dept: URGENT CARE | Facility: URGENT CARE | Age: 55
End: 2025-08-06

## 2025-08-06 VITALS
OXYGEN SATURATION: 92 % | HEART RATE: 91 BPM | WEIGHT: 279 LBS | BODY MASS INDEX: 42.42 KG/M2 | RESPIRATION RATE: 20 BRPM | DIASTOLIC BLOOD PRESSURE: 77 MMHG | TEMPERATURE: 97 F | SYSTOLIC BLOOD PRESSURE: 122 MMHG

## 2025-08-06 DIAGNOSIS — L03.115 CELLULITIS OF RIGHT LOWER LEG: Primary | ICD-10-CM

## 2025-08-06 DIAGNOSIS — L97.418 DIABETIC ULCER OF RIGHT MIDFOOT ASSOCIATED WITH TYPE 2 DIABETES MELLITUS, WITH OTHER ULCER SEVERITY (H): ICD-10-CM

## 2025-08-06 DIAGNOSIS — R60.9 3+ PITTING EDEMA: ICD-10-CM

## 2025-08-06 DIAGNOSIS — E11.621 DIABETIC ULCER OF RIGHT MIDFOOT ASSOCIATED WITH TYPE 2 DIABETES MELLITUS, WITH OTHER ULCER SEVERITY (H): ICD-10-CM

## 2025-08-06 PROBLEM — R60.0 LOWER EXTREMITY EDEMA: Status: ACTIVE | Noted: 2025-08-06

## 2025-08-06 PROCEDURE — 93970 EXTREMITY STUDY: CPT

## 2025-08-06 PROCEDURE — 99215 OFFICE O/P EST HI 40 MIN: CPT | Performed by: NURSE PRACTITIONER

## 2025-08-06 PROCEDURE — 71046 X-RAY EXAM CHEST 2 VIEWS: CPT

## 2025-08-06 PROCEDURE — 73630 X-RAY EXAM OF FOOT: CPT | Mod: RT

## 2025-08-06 ASSESSMENT — ACTIVITIES OF DAILY LIVING (ADL)
ADLS_ACUITY_SCORE: 58

## 2025-08-07 ENCOUNTER — APPOINTMENT (OUTPATIENT)
Dept: CT IMAGING | Facility: CLINIC | Age: 55
End: 2025-08-07
Attending: INTERNAL MEDICINE

## 2025-08-07 ENCOUNTER — APPOINTMENT (OUTPATIENT)
Dept: MRI IMAGING | Facility: CLINIC | Age: 55
End: 2025-08-07
Attending: PODIATRIST

## 2025-08-07 PROCEDURE — 73718 MRI LOWER EXTREMITY W/O DYE: CPT | Mod: RT

## 2025-08-07 PROCEDURE — 71250 CT THORAX DX C-: CPT

## 2025-08-07 ASSESSMENT — ACTIVITIES OF DAILY LIVING (ADL)
ADLS_ACUITY_SCORE: 61
ADLS_ACUITY_SCORE: 58
ADLS_ACUITY_SCORE: 61
ADLS_ACUITY_SCORE: 61
ADLS_ACUITY_SCORE: 58
ADLS_ACUITY_SCORE: 61
ADLS_ACUITY_SCORE: 58
ADLS_ACUITY_SCORE: 61

## 2025-08-08 ASSESSMENT — ACTIVITIES OF DAILY LIVING (ADL)
ADLS_ACUITY_SCORE: 61

## 2025-08-09 ASSESSMENT — ACTIVITIES OF DAILY LIVING (ADL)
ADLS_ACUITY_SCORE: 61
ADLS_ACUITY_SCORE: 61
ADLS_ACUITY_SCORE: 60
ADLS_ACUITY_SCORE: 61
ADLS_ACUITY_SCORE: 60
ADLS_ACUITY_SCORE: 60
ADLS_ACUITY_SCORE: 61
ADLS_ACUITY_SCORE: 60
ADLS_ACUITY_SCORE: 61
ADLS_ACUITY_SCORE: 60
ADLS_ACUITY_SCORE: 61
ADLS_ACUITY_SCORE: 60
ADLS_ACUITY_SCORE: 61

## 2025-08-10 ENCOUNTER — ANESTHESIA EVENT (OUTPATIENT)
Dept: SURGERY | Facility: CLINIC | Age: 55
End: 2025-08-10

## 2025-08-10 ENCOUNTER — APPOINTMENT (OUTPATIENT)
Dept: GENERAL RADIOLOGY | Facility: CLINIC | Age: 55
End: 2025-08-10
Attending: PODIATRIST

## 2025-08-10 ENCOUNTER — ANESTHESIA (OUTPATIENT)
Dept: SURGERY | Facility: CLINIC | Age: 55
End: 2025-08-10

## 2025-08-10 PROCEDURE — 999N000063 XR FOOT PORT RIGHT 3 VIEWS: Mod: RT

## 2025-08-10 PROCEDURE — P9045 ALBUMIN (HUMAN), 5%, 250 ML: HCPCS | Mod: JZ | Performed by: NURSE ANESTHETIST, CERTIFIED REGISTERED

## 2025-08-10 PROCEDURE — 250N000011 HC RX IP 250 OP 636: Performed by: NURSE ANESTHETIST, CERTIFIED REGISTERED

## 2025-08-10 PROCEDURE — 250N000009 HC RX 250: Performed by: NURSE ANESTHETIST, CERTIFIED REGISTERED

## 2025-08-10 PROCEDURE — 258N000003 HC RX IP 258 OP 636: Performed by: NURSE ANESTHETIST, CERTIFIED REGISTERED

## 2025-08-10 RX ORDER — LIDOCAINE HYDROCHLORIDE 20 MG/ML
INJECTION, SOLUTION INFILTRATION; PERINEURAL PRN
Status: DISCONTINUED | OUTPATIENT
Start: 2025-08-10 | End: 2025-08-10

## 2025-08-10 RX ORDER — KETOROLAC TROMETHAMINE 30 MG/ML
INJECTION, SOLUTION INTRAMUSCULAR; INTRAVENOUS PRN
Status: DISCONTINUED | OUTPATIENT
Start: 2025-08-10 | End: 2025-08-10

## 2025-08-10 RX ORDER — PROPOFOL 10 MG/ML
INJECTION, EMULSION INTRAVENOUS PRN
Status: DISCONTINUED | OUTPATIENT
Start: 2025-08-10 | End: 2025-08-10

## 2025-08-10 RX ORDER — PROPOFOL 10 MG/ML
INJECTION, EMULSION INTRAVENOUS CONTINUOUS PRN
Status: DISCONTINUED | OUTPATIENT
Start: 2025-08-10 | End: 2025-08-10

## 2025-08-10 RX ORDER — CEFAZOLIN SODIUM IN 0.9 % NACL 3 G/100 ML
INTRAVENOUS SOLUTION, PIGGYBACK (ML) INTRAVENOUS PRN
Status: DISCONTINUED | OUTPATIENT
Start: 2025-08-10 | End: 2025-08-10

## 2025-08-10 RX ORDER — FENTANYL CITRATE 50 UG/ML
INJECTION, SOLUTION INTRAMUSCULAR; INTRAVENOUS PRN
Status: DISCONTINUED | OUTPATIENT
Start: 2025-08-10 | End: 2025-08-10

## 2025-08-10 RX ORDER — EPHEDRINE SULFATE 50 MG/ML
INJECTION, SOLUTION INTRAMUSCULAR; INTRAVENOUS; SUBCUTANEOUS PRN
Status: DISCONTINUED | OUTPATIENT
Start: 2025-08-10 | End: 2025-08-10

## 2025-08-10 RX ADMIN — PHENYLEPHRINE HYDROCHLORIDE 100 MCG: 10 INJECTION INTRAVENOUS at 08:20

## 2025-08-10 RX ADMIN — FENTANYL CITRATE 25 MCG: 50 INJECTION INTRAMUSCULAR; INTRAVENOUS at 07:52

## 2025-08-10 RX ADMIN — KETOROLAC TROMETHAMINE 15 MG: 30 INJECTION, SOLUTION INTRAMUSCULAR at 08:26

## 2025-08-10 RX ADMIN — PROPOFOL 200 MG: 10 INJECTION, EMULSION INTRAVENOUS at 07:38

## 2025-08-10 RX ADMIN — Medication 3 G: at 07:34

## 2025-08-10 RX ADMIN — FENTANYL CITRATE 25 MCG: 50 INJECTION INTRAMUSCULAR; INTRAVENOUS at 07:38

## 2025-08-10 RX ADMIN — PROPOFOL 100 MCG/KG/MIN: 10 INJECTION, EMULSION INTRAVENOUS at 07:40

## 2025-08-10 RX ADMIN — PHENYLEPHRINE HYDROCHLORIDE 100 MCG: 10 INJECTION INTRAVENOUS at 08:29

## 2025-08-10 RX ADMIN — Medication 10 MG: at 08:10

## 2025-08-10 RX ADMIN — SUCCINYLCHOLINE CHLORIDE 120 MG: 20 INJECTION, SOLUTION INTRAMUSCULAR; INTRAVENOUS; PARENTERAL at 07:38

## 2025-08-10 RX ADMIN — PHENYLEPHRINE HYDROCHLORIDE 200 MCG: 10 INJECTION INTRAVENOUS at 08:08

## 2025-08-10 RX ADMIN — Medication 5 MG: at 08:05

## 2025-08-10 RX ADMIN — ALBUMIN (HUMAN): 12.5 SOLUTION INTRAVENOUS at 08:10

## 2025-08-10 RX ADMIN — LIDOCAINE HYDROCHLORIDE 100 MG: 20 INJECTION, SOLUTION INFILTRATION; PERINEURAL at 07:38

## 2025-08-10 RX ADMIN — PHENYLEPHRINE HYDROCHLORIDE 100 MCG: 10 INJECTION INTRAVENOUS at 08:02

## 2025-08-10 RX ADMIN — PHENYLEPHRINE HYDROCHLORIDE 100 MCG: 10 INJECTION INTRAVENOUS at 08:04

## 2025-08-10 RX ADMIN — PHENYLEPHRINE HYDROCHLORIDE 100 MCG: 10 INJECTION INTRAVENOUS at 08:25

## 2025-08-10 RX ADMIN — Medication 5 MG: at 08:07

## 2025-08-10 RX ADMIN — FENTANYL CITRATE 50 MCG: 50 INJECTION INTRAMUSCULAR; INTRAVENOUS at 07:58

## 2025-08-10 RX ADMIN — Medication 5 MG: at 08:16

## 2025-08-10 RX ADMIN — PHENYLEPHRINE HYDROCHLORIDE 200 MCG: 10 INJECTION INTRAVENOUS at 08:14

## 2025-08-10 ASSESSMENT — ACTIVITIES OF DAILY LIVING (ADL)
ADLS_ACUITY_SCORE: 62
ADLS_ACUITY_SCORE: 61
ADLS_ACUITY_SCORE: 62
ADLS_ACUITY_SCORE: 61
ADLS_ACUITY_SCORE: 62
ADLS_ACUITY_SCORE: 61
ADLS_ACUITY_SCORE: 62
ADLS_ACUITY_SCORE: 62
ADLS_ACUITY_SCORE: 61
ADLS_ACUITY_SCORE: 62
ADLS_ACUITY_SCORE: 61
ADLS_ACUITY_SCORE: 62
ADLS_ACUITY_SCORE: 62

## 2025-08-11 ENCOUNTER — APPOINTMENT (OUTPATIENT)
Dept: PHYSICAL THERAPY | Facility: CLINIC | Age: 55
DRG: 616 | End: 2025-08-11
Attending: PODIATRIST

## 2025-08-11 ENCOUNTER — DOCUMENTATION ONLY (OUTPATIENT)
Dept: ORTHOPEDICS | Facility: CLINIC | Age: 55
End: 2025-08-11

## 2025-08-11 ASSESSMENT — ACTIVITIES OF DAILY LIVING (ADL)
ADLS_ACUITY_SCORE: 62
ADLS_ACUITY_SCORE: 62
ADLS_ACUITY_SCORE: 61
ADLS_ACUITY_SCORE: 62
ADLS_ACUITY_SCORE: 61
ADLS_ACUITY_SCORE: 61
ADLS_ACUITY_SCORE: 62

## 2025-08-12 ENCOUNTER — APPOINTMENT (OUTPATIENT)
Dept: PHYSICAL THERAPY | Facility: CLINIC | Age: 55
DRG: 616 | End: 2025-08-12

## 2025-08-12 ASSESSMENT — ACTIVITIES OF DAILY LIVING (ADL)
ADLS_ACUITY_SCORE: 60
ADLS_ACUITY_SCORE: 61
ADLS_ACUITY_SCORE: 60
ADLS_ACUITY_SCORE: 61
ADLS_ACUITY_SCORE: 60
ADLS_ACUITY_SCORE: 61
ADLS_ACUITY_SCORE: 60
ADLS_ACUITY_SCORE: 60
ADLS_ACUITY_SCORE: 61
ADLS_ACUITY_SCORE: 60
ADLS_ACUITY_SCORE: 60
ADLS_ACUITY_SCORE: 61
ADLS_ACUITY_SCORE: 60
ADLS_ACUITY_SCORE: 61
ADLS_ACUITY_SCORE: 60
ADLS_ACUITY_SCORE: 61
ADLS_ACUITY_SCORE: 60

## 2025-08-13 ENCOUNTER — APPOINTMENT (OUTPATIENT)
Dept: PHYSICAL THERAPY | Facility: CLINIC | Age: 55
DRG: 616 | End: 2025-08-13

## 2025-08-13 ENCOUNTER — APPOINTMENT (OUTPATIENT)
Dept: OCCUPATIONAL THERAPY | Facility: CLINIC | Age: 55
DRG: 616 | End: 2025-08-13
Attending: STUDENT IN AN ORGANIZED HEALTH CARE EDUCATION/TRAINING PROGRAM

## 2025-08-13 ASSESSMENT — ACTIVITIES OF DAILY LIVING (ADL)
ADLS_ACUITY_SCORE: 61